# Patient Record
Sex: MALE | Race: BLACK OR AFRICAN AMERICAN | NOT HISPANIC OR LATINO | URBAN - METROPOLITAN AREA
[De-identification: names, ages, dates, MRNs, and addresses within clinical notes are randomized per-mention and may not be internally consistent; named-entity substitution may affect disease eponyms.]

---

## 2019-10-07 ENCOUNTER — INPATIENT (INPATIENT)
Facility: HOSPITAL | Age: 39
LOS: 2 days | Discharge: HOME | DRG: 292 | End: 2019-10-10
Attending: INTERNAL MEDICINE | Admitting: STUDENT IN AN ORGANIZED HEALTH CARE EDUCATION/TRAINING PROGRAM
Payer: COMMERCIAL

## 2019-10-07 VITALS
DIASTOLIC BLOOD PRESSURE: 115 MMHG | HEIGHT: 76 IN | OXYGEN SATURATION: 98 % | RESPIRATION RATE: 18 BRPM | WEIGHT: 315 LBS | HEART RATE: 70 BPM | TEMPERATURE: 98 F | SYSTOLIC BLOOD PRESSURE: 184 MMHG

## 2019-10-07 PROCEDURE — 99283 EMERGENCY DEPT VISIT LOW MDM: CPT

## 2019-10-07 NOTE — ED PROVIDER NOTE - OBJECTIVE STATEMENT
39yr M hx of CHF on medication including diuretics presented to another facility with sob and worsening edema and was treated as NSTEMI due to persistent chest pain despite normal trop ekg non specific. arrived on heparin infusion and received aspirin.  pt reports travelling and missing his meds and felt sob and LEACH with minimal effort. denies chest pain. has peripheral edema but symmetric. denies fever chills, abd pain, change in bowel habits but reports decreased urine output.

## 2019-10-07 NOTE — ED PROVIDER NOTE - ATTENDING CONTRIBUTION TO CARE
39yr M hx of CHF and recent clean cath in December p/w sob and bishop after missing a few doses of his diuretic due to travelling. leg swelling, symmetric and non painful. no cp. no fever chills, no abd pain, no cough, no change in bowel habits. pt was seen at another facility and had trop neg, bnp 1000, and ekg non specific and cardiology was consulted who recommended NSTEMI treatment with heparin.   exam pitting edema bilat, no heart murmur, clear lungs, soft abd.    low concern for ongoing ischemia, more likely CHF. pt now more comfotrable and no longer short of breath. will continue heparin, send labs repeat trop cxr and consult cards regarding management and admission.  signed out pending labs, cxr.

## 2019-10-08 DIAGNOSIS — N17.9 ACUTE KIDNEY FAILURE, UNSPECIFIED: ICD-10-CM

## 2019-10-08 DIAGNOSIS — Z29.9 ENCOUNTER FOR PROPHYLACTIC MEASURES, UNSPECIFIED: ICD-10-CM

## 2019-10-08 DIAGNOSIS — I50.9 HEART FAILURE, UNSPECIFIED: ICD-10-CM

## 2019-10-08 DIAGNOSIS — I10 ESSENTIAL (PRIMARY) HYPERTENSION: ICD-10-CM

## 2019-10-08 DIAGNOSIS — R94.31 ABNORMAL ELECTROCARDIOGRAM [ECG] [EKG]: ICD-10-CM

## 2019-10-08 DIAGNOSIS — D64.9 ANEMIA, UNSPECIFIED: ICD-10-CM

## 2019-10-08 LAB
ALBUMIN SERPL ELPH-MCNC: 3.3 G/DL — SIGNIFICANT CHANGE UP (ref 3.3–5)
ALP SERPL-CCNC: 63 U/L — SIGNIFICANT CHANGE UP (ref 40–120)
ALT FLD-CCNC: 25 U/L — SIGNIFICANT CHANGE UP (ref 10–45)
ANION GAP SERPL CALC-SCNC: 10 MMOL/L — SIGNIFICANT CHANGE UP (ref 5–17)
ANION GAP SERPL CALC-SCNC: 11 MMOL/L — SIGNIFICANT CHANGE UP (ref 5–17)
AST SERPL-CCNC: 19 U/L — SIGNIFICANT CHANGE UP (ref 10–40)
BASOPHILS # BLD AUTO: 0.06 K/UL — SIGNIFICANT CHANGE UP (ref 0–0.2)
BASOPHILS # BLD AUTO: 0.08 K/UL — SIGNIFICANT CHANGE UP (ref 0–0.2)
BASOPHILS NFR BLD AUTO: 0.6 % — SIGNIFICANT CHANGE UP (ref 0–2)
BASOPHILS NFR BLD AUTO: 0.9 % — SIGNIFICANT CHANGE UP (ref 0–2)
BILIRUB SERPL-MCNC: 0.3 MG/DL — SIGNIFICANT CHANGE UP (ref 0.2–1.2)
BUN SERPL-MCNC: 15 MG/DL — SIGNIFICANT CHANGE UP (ref 7–23)
BUN SERPL-MCNC: 17 MG/DL — SIGNIFICANT CHANGE UP (ref 7–23)
CALCIUM SERPL-MCNC: 8.2 MG/DL — LOW (ref 8.4–10.5)
CALCIUM SERPL-MCNC: 8.4 MG/DL — SIGNIFICANT CHANGE UP (ref 8.4–10.5)
CHLORIDE SERPL-SCNC: 107 MMOL/L — SIGNIFICANT CHANGE UP (ref 96–108)
CHLORIDE SERPL-SCNC: 108 MMOL/L — SIGNIFICANT CHANGE UP (ref 96–108)
CO2 SERPL-SCNC: 22 MMOL/L — SIGNIFICANT CHANGE UP (ref 22–31)
CO2 SERPL-SCNC: 23 MMOL/L — SIGNIFICANT CHANGE UP (ref 22–31)
CREAT SERPL-MCNC: 1.12 MG/DL — SIGNIFICANT CHANGE UP (ref 0.5–1.3)
CREAT SERPL-MCNC: 1.4 MG/DL — HIGH (ref 0.5–1.3)
EOSINOPHIL # BLD AUTO: 0.15 K/UL — SIGNIFICANT CHANGE UP (ref 0–0.5)
EOSINOPHIL # BLD AUTO: 0.16 K/UL — SIGNIFICANT CHANGE UP (ref 0–0.5)
EOSINOPHIL NFR BLD AUTO: 1.5 % — SIGNIFICANT CHANGE UP (ref 0–6)
EOSINOPHIL NFR BLD AUTO: 1.8 % — SIGNIFICANT CHANGE UP (ref 0–6)
FERRITIN SERPL-MCNC: 51 NG/ML — SIGNIFICANT CHANGE UP (ref 30–400)
FOLATE SERPL-MCNC: 15.8 NG/ML — SIGNIFICANT CHANGE UP
GLUCOSE SERPL-MCNC: 109 MG/DL — HIGH (ref 70–99)
GLUCOSE SERPL-MCNC: 112 MG/DL — HIGH (ref 70–99)
HCT VFR BLD CALC: 36.9 % — LOW (ref 39–50)
HCT VFR BLD CALC: 39 % — SIGNIFICANT CHANGE UP (ref 39–50)
HGB BLD-MCNC: 11.5 G/DL — LOW (ref 13–17)
HGB BLD-MCNC: 11.7 G/DL — LOW (ref 13–17)
IMM GRANULOCYTES NFR BLD AUTO: 0.4 % — SIGNIFICANT CHANGE UP (ref 0–1.5)
IRON SATN MFR SERPL: 25 UG/DL — LOW (ref 45–165)
IRON SATN MFR SERPL: 7 % — LOW (ref 16–55)
LYMPHOCYTES # BLD AUTO: 1.43 K/UL — SIGNIFICANT CHANGE UP (ref 1–3.3)
LYMPHOCYTES # BLD AUTO: 1.51 K/UL — SIGNIFICANT CHANGE UP (ref 1–3.3)
LYMPHOCYTES # BLD AUTO: 14.1 % — SIGNIFICANT CHANGE UP (ref 13–44)
LYMPHOCYTES # BLD AUTO: 16.8 % — SIGNIFICANT CHANGE UP (ref 13–44)
MAGNESIUM SERPL-MCNC: 2.1 MG/DL — SIGNIFICANT CHANGE UP (ref 1.6–2.6)
MCHC RBC-ENTMCNC: 21.3 PG — LOW (ref 27–34)
MCHC RBC-ENTMCNC: 21.8 PG — LOW (ref 27–34)
MCHC RBC-ENTMCNC: 30 GM/DL — LOW (ref 32–36)
MCHC RBC-ENTMCNC: 31.2 GM/DL — LOW (ref 32–36)
MCV RBC AUTO: 70 FL — LOW (ref 80–100)
MCV RBC AUTO: 71 FL — LOW (ref 80–100)
MONOCYTES # BLD AUTO: 0.53 K/UL — SIGNIFICANT CHANGE UP (ref 0–0.9)
MONOCYTES # BLD AUTO: 0.68 K/UL — SIGNIFICANT CHANGE UP (ref 0–0.9)
MONOCYTES NFR BLD AUTO: 6.2 % — SIGNIFICANT CHANGE UP (ref 2–14)
MONOCYTES NFR BLD AUTO: 6.3 % — SIGNIFICANT CHANGE UP (ref 2–14)
NEUTROPHILS # BLD AUTO: 5.89 K/UL — SIGNIFICANT CHANGE UP (ref 1.8–7.4)
NEUTROPHILS # BLD AUTO: 8.27 K/UL — HIGH (ref 1.8–7.4)
NEUTROPHILS NFR BLD AUTO: 69 % — SIGNIFICANT CHANGE UP (ref 43–77)
NEUTROPHILS NFR BLD AUTO: 77.1 % — HIGH (ref 43–77)
NT-PROBNP SERPL-SCNC: 939 PG/ML — HIGH (ref 0–300)
PHOSPHATE SERPL-MCNC: 2.8 MG/DL — SIGNIFICANT CHANGE UP (ref 2.5–4.5)
PLATELET # BLD AUTO: 260 K/UL — SIGNIFICANT CHANGE UP (ref 150–400)
PLATELET # BLD AUTO: 277 K/UL — SIGNIFICANT CHANGE UP (ref 150–400)
POTASSIUM SERPL-MCNC: 3.6 MMOL/L — SIGNIFICANT CHANGE UP (ref 3.5–5.3)
POTASSIUM SERPL-MCNC: 4 MMOL/L — SIGNIFICANT CHANGE UP (ref 3.5–5.3)
POTASSIUM SERPL-SCNC: 3.6 MMOL/L — SIGNIFICANT CHANGE UP (ref 3.5–5.3)
POTASSIUM SERPL-SCNC: 4 MMOL/L — SIGNIFICANT CHANGE UP (ref 3.5–5.3)
PROT SERPL-MCNC: 6.4 G/DL — SIGNIFICANT CHANGE UP (ref 6–8.3)
RBC # BLD: 5.27 M/UL — SIGNIFICANT CHANGE UP (ref 4.2–5.8)
RBC # BLD: 5.49 M/UL — SIGNIFICANT CHANGE UP (ref 4.2–5.8)
RBC # FLD: 17.7 % — HIGH (ref 10.3–14.5)
RBC # FLD: 18.4 % — HIGH (ref 10.3–14.5)
SODIUM SERPL-SCNC: 139 MMOL/L — SIGNIFICANT CHANGE UP (ref 135–145)
SODIUM SERPL-SCNC: 142 MMOL/L — SIGNIFICANT CHANGE UP (ref 135–145)
TIBC SERPL-MCNC: 382 UG/DL — SIGNIFICANT CHANGE UP (ref 220–430)
TROPONIN T, HIGH SENSITIVITY RESULT: 47 NG/L — SIGNIFICANT CHANGE UP (ref 0–51)
TROPONIN T, HIGH SENSITIVITY RESULT: 51 NG/L — SIGNIFICANT CHANGE UP (ref 0–51)
UIBC SERPL-MCNC: 357 UG/DL — SIGNIFICANT CHANGE UP (ref 110–370)
VIT B12 SERPL-MCNC: 338 PG/ML — SIGNIFICANT CHANGE UP (ref 232–1245)
WBC # BLD: 10.72 K/UL — HIGH (ref 3.8–10.5)
WBC # BLD: 8.53 K/UL — SIGNIFICANT CHANGE UP (ref 3.8–10.5)
WBC # FLD AUTO: 10.72 K/UL — HIGH (ref 3.8–10.5)
WBC # FLD AUTO: 8.53 K/UL — SIGNIFICANT CHANGE UP (ref 3.8–10.5)

## 2019-10-08 PROCEDURE — 71045 X-RAY EXAM CHEST 1 VIEW: CPT | Mod: 26

## 2019-10-08 PROCEDURE — 12345: CPT | Mod: NC

## 2019-10-08 PROCEDURE — 99223 1ST HOSP IP/OBS HIGH 75: CPT

## 2019-10-08 RX ORDER — NIFEDIPINE 30 MG
60 TABLET, EXTENDED RELEASE 24 HR ORAL DAILY
Refills: 0 | Status: DISCONTINUED | OUTPATIENT
Start: 2019-10-09 | End: 2019-10-10

## 2019-10-08 RX ORDER — NIFEDIPINE 30 MG
30 TABLET, EXTENDED RELEASE 24 HR ORAL ONCE
Refills: 0 | Status: COMPLETED | OUTPATIENT
Start: 2019-10-08 | End: 2019-10-08

## 2019-10-08 RX ORDER — FERROUS SULFATE 325(65) MG
325 TABLET ORAL DAILY
Refills: 0 | Status: DISCONTINUED | OUTPATIENT
Start: 2019-10-08 | End: 2019-10-10

## 2019-10-08 RX ORDER — INFLUENZA VIRUS VACCINE 15; 15; 15; 15 UG/.5ML; UG/.5ML; UG/.5ML; UG/.5ML
0.5 SUSPENSION INTRAMUSCULAR ONCE
Refills: 0 | Status: DISCONTINUED | OUTPATIENT
Start: 2019-10-08 | End: 2019-10-10

## 2019-10-08 RX ORDER — FUROSEMIDE 40 MG
40 TABLET ORAL
Refills: 0 | Status: DISCONTINUED | OUTPATIENT
Start: 2019-10-08 | End: 2019-10-10

## 2019-10-08 RX ORDER — ASPIRIN/CALCIUM CARB/MAGNESIUM 324 MG
81 TABLET ORAL DAILY
Refills: 0 | Status: DISCONTINUED | OUTPATIENT
Start: 2019-10-08 | End: 2019-10-10

## 2019-10-08 RX ORDER — SIMVASTATIN 20 MG/1
20 TABLET, FILM COATED ORAL AT BEDTIME
Refills: 0 | Status: DISCONTINUED | OUTPATIENT
Start: 2019-10-08 | End: 2019-10-10

## 2019-10-08 RX ORDER — NIFEDIPINE 30 MG
30 TABLET, EXTENDED RELEASE 24 HR ORAL DAILY
Refills: 0 | Status: DISCONTINUED | OUTPATIENT
Start: 2019-10-08 | End: 2019-10-08

## 2019-10-08 RX ORDER — ENOXAPARIN SODIUM 100 MG/ML
40 INJECTION SUBCUTANEOUS DAILY
Refills: 0 | Status: DISCONTINUED | OUTPATIENT
Start: 2019-10-08 | End: 2019-10-10

## 2019-10-08 RX ORDER — LISINOPRIL 2.5 MG/1
20 TABLET ORAL DAILY
Refills: 0 | Status: DISCONTINUED | OUTPATIENT
Start: 2019-10-08 | End: 2019-10-10

## 2019-10-08 RX ORDER — CARVEDILOL PHOSPHATE 80 MG/1
12.5 CAPSULE, EXTENDED RELEASE ORAL EVERY 12 HOURS
Refills: 0 | Status: DISCONTINUED | OUTPATIENT
Start: 2019-10-08 | End: 2019-10-10

## 2019-10-08 RX ADMIN — CARVEDILOL PHOSPHATE 12.5 MILLIGRAM(S): 80 CAPSULE, EXTENDED RELEASE ORAL at 17:39

## 2019-10-08 RX ADMIN — Medication 40 MILLIGRAM(S): at 17:39

## 2019-10-08 RX ADMIN — Medication 30 MILLIGRAM(S): at 08:19

## 2019-10-08 RX ADMIN — CARVEDILOL PHOSPHATE 12.5 MILLIGRAM(S): 80 CAPSULE, EXTENDED RELEASE ORAL at 06:51

## 2019-10-08 RX ADMIN — SIMVASTATIN 20 MILLIGRAM(S): 20 TABLET, FILM COATED ORAL at 22:00

## 2019-10-08 RX ADMIN — Medication 325 MILLIGRAM(S): at 17:39

## 2019-10-08 RX ADMIN — LISINOPRIL 20 MILLIGRAM(S): 2.5 TABLET ORAL at 06:51

## 2019-10-08 RX ADMIN — Medication 81 MILLIGRAM(S): at 12:03

## 2019-10-08 RX ADMIN — Medication 40 MILLIGRAM(S): at 06:51

## 2019-10-08 RX ADMIN — ENOXAPARIN SODIUM 40 MILLIGRAM(S): 100 INJECTION SUBCUTANEOUS at 12:03

## 2019-10-08 RX ADMIN — Medication 30 MILLIGRAM(S): at 22:00

## 2019-10-08 NOTE — PROVIDER CONTACT NOTE (OTHER) - ASSESSMENT
Pt A&Ox4, pt states his blood normally runs higher than this. Pt denies CP palpitation HA or SOB. Pt given lisinopril, procardia and IVP lasix.

## 2019-10-08 NOTE — PROGRESS NOTE ADULT - SUBJECTIVE AND OBJECTIVE BOX
Patient to be followed by Non-Service Consult Attending.  Please call [phone] regarding clinical questions regarding this patient. Uptitrate his BP meds and get under better control. Increased the nifedipine to 60 mg daily and if his BP remains elevated >140/>90, increase the lisinopril to 30 mg daily. Pending ECHO. No plans for cath or stress test as the decompensated HFpEF and elevated troponin are directly related to the severely elevated blood pressures. Continue IV diuresis and will need to go on adequate diuretics. Counseled on good BP monitoring at home and encouraged him never to go anywhere without his meds. Complete note to follow.     Prasanth Rader MD, MPH, PEREZ, RPVI, FACC  Cardiovascular Specialist   [facility]  c: [phone]  e: [email or web address]

## 2019-10-08 NOTE — H&P ADULT - PROBLEM SELECTOR PLAN 1
hx of CHF, ?2/2 uncontrolled HTN. No TTE on EMR. Elevated BNP, SOB and LE edema c/w likely CHF exacerbation   c/w lasix 40 IV BID for now, monitor electrolytes closely   daily weights  strict I/Os  check TTE  monitor on tele  c/w statin

## 2019-10-08 NOTE — PROGRESS NOTE ADULT - PROBLEM SELECTOR PLAN 3
prolonged QT on EKG to 524  no electrolyte abn noted, will c/t monitor and replete as needed    monitor on tele   repeat EKG in AM prolonged QT on EKG to 524  no electrolyte abn noted, will c/t monitor and replete as needed    monitor on tele   repeat EKG is ordered

## 2019-10-08 NOTE — PROGRESS NOTE ADULT - PROBLEM SELECTOR PLAN 1
hx of CHF, ?2/2 uncontrolled HTN. No TTE on EMR. Elevated BNP, SOB and LE edema c/w likely CHF exacerbation   c/w lasix 40 IV BID for now, monitor electrolytes closely   daily weights  strict I/Os  check TTE  monitor on tele  c/w statin  Cardio consult is pend hx of CHF, ?2/2 uncontrolled HTN. No TTE on EMR. Elevated BNP, SOB and LE edema c/w likely CHF exacerbation   c/w lasix 40 IV BID for now, monitor electrolytes closely   daily weights  strict I/Os  check TTE  monitor on tele  c/w statin  Cardio consult is pend  Cont Coreg/Lisinopril which were pt's home meds

## 2019-10-08 NOTE — CHART NOTE - NSCHARTNOTEFT_GEN_A_CORE
CC: Hypertension 166/101    39 y.o male transferred from OSH last night for symptomatic HF and HTN of 190s/120s, was found to have a blood pressure of 166/101 this afternoon.  Patient was started on 40mg IV BID at admission.  Patient was seen at bedside and denied any dizziness, headaches, chest pain, shortness of breath or dyspnea.      ICU Vital Signs Last 24 Hrs  T(C): 36.6 (08 Oct 2019 14:55), Max: 36.8 (07 Oct 2019 23:27)  T(F): 97.9 (08 Oct 2019 14:55), Max: 98.3 (07 Oct 2019 23:27)  HR: 67 (08 Oct 2019 17:34) (67 - 82)  BP: 168/94 (08 Oct 2019 17:34) (141/82 - 184/115)  BP(mean): --  ABP: --  ABP(mean): --  RR: 20 (08 Oct 2019 14:55) (18 - 21)  SpO2: 97% (08 Oct 2019 14:55) (95% - 98%)    Physical Exam:    General: NAD  HEENT: CC: Hypertension 166/101    39 y.o male transferred from OSH last night for symptomatic HF and HTN of 190s/120s, was found to have a blood pressure of 166/101 this afternoon.  Patient was started on 40mg IV BID at admission.  Patient was seen at bedside and denied any dizziness, headaches, chest pain, shortness of breath or dyspnea.      ICU Vital Signs Last 24 Hrs  T(C): 36.6 (08 Oct 2019 14:55), Max: 36.8 (07 Oct 2019 23:27)  T(F): 97.9 (08 Oct 2019 14:55), Max: 98.3 (07 Oct 2019 23:27)  HR: 67 (08 Oct 2019 17:34) (67 - 82)  BP: 168/94 (08 Oct 2019 17:34) (141/82 - 184/115)  BP(mean): --  ABP: --  ABP(mean): --  RR: 20 (08 Oct 2019 14:55) (18 - 21)  SpO2: 97% (08 Oct 2019 14:55) (95% - 98%)    Physical Exam:    General: NAD  HEENT: Normocephalic, atraumatic.  Moist mucous membranes  Pulmonary: Breath sounds heard bilaterally without crackles  Cardiovascular: S1 and S2 heard on auscultation.  Regular rate and rhythm  Peripheral Vascular: Pedal edema present bilaterally.  DP present bilaterally    Assessment: Htn  Plan: Patient's blood pressure has downtrended since admission.  Cardiology was consulted, will increase Nifedipine to 60mg daily, continue with scheduled medications including IV Lasix and home HTN medications, and continue to monitor.  RN will notify provider if there are any concerns.    Sumeet Santos PA-C  Department of Medicine

## 2019-10-08 NOTE — PROGRESS NOTE ADULT - PROBLEM SELECTOR PLAN 2
pt with uncontrolled HTN at OSH, 190s/120s. s/p lisinopril, coreg and brief labetalol gtt. BP improved currently, but still above goal   c/w lisinopril at home dose - may have to d/c pending Cr trend   c/w coreg at home dose   c/w nifedipine 30mg QD - may have to increase if BP remains uncontrolled pt with uncontrolled HTN at OSH, 190s/120s. s/p lisinopril, coreg and brief labetalol gtt. BP improved currently, but still above goal   c/w lisinopril at home dose / monitor Cr   c/w coreg at home dose   c/w nifedipine 30mg QD

## 2019-10-08 NOTE — H&P ADULT - PROBLEM SELECTOR PLAN 4
mild anemia on labs, unclear etiology   will check iron studies, folate, b12  c/t monitor MUKESH vs CKD, unclear baseline Cr   possibly 2/2 ACEi vs uncontrolled hypertension   will check urine studies   c/t monitor  if Cr remains elevated, consider renal US

## 2019-10-08 NOTE — H&P ADULT - PROBLEM SELECTOR PLAN 5
Lovenox for DVT ppx mild anemia on labs, unclear etiology   will check iron studies, folate, b12  c/t monitor

## 2019-10-08 NOTE — PROGRESS NOTE ADULT - PROBLEM SELECTOR PLAN 5
mild anemia on labs, unclear etiology   will check iron studies, folate, b12  c/t monitor Iron def anemia   will start ferrous sulfate   Pt will need to f.up with PCP for further work up   Fecal occult test is ordered

## 2019-10-08 NOTE — ED ADULT NURSE REASSESSMENT NOTE - NS ED NURSE REASSESS COMMENT FT1
pt presents with heparin drip infusing from EMS.  MD Nieves called cards and was recommended to stop heparin.  Heparin stopped at 0007 in ED.

## 2019-10-08 NOTE — H&P ADULT - PROBLEM SELECTOR PLAN 2
pt with uncontrolled HTN at OSH, 190s/120s. s/p lisinopril, coreg and brief labetalol gtt. BP improved currently, but still above goal   c/w lisinopril at home dose - may have to d/c pending Cr trend   c/w coreg at home dose   c/w nifedipine 30mg QD - may have to increase if BP remains uncontrolled

## 2019-10-08 NOTE — H&P ADULT - NSHPLABSRESULTS_GEN_ALL_CORE
Labs, imaging and EKG personally reviewed and interpreted by me - mild anemia 11.5, Cr 1.40, BNP elevated, delta trop (-). CXR with no opacities. EKG NSR 70s, TWI in leads II/AVF and V5-v6, .                          11.5   8.53  )-----------( 277      ( 08 Oct 2019 00:38 )             36.9     10-08    142  |  108  |  17  ----------------------------<  109<H>  4.0   |  23  |  1.40<H>    Ca    8.4      08 Oct 2019 00:38    TPro  6.4  /  Alb  3.3  /  TBili  0.3  /  DBili  x   /  AST  19  /  ALT  25  /  AlkPhos  63  10-08    Serum Pro-Brain Natriuretic Peptide: 939 pg/mL (10.08.19 @ 00:38)    Troponin T, High Sensitivity Result: 51 --> 47    < from: Xray Chest 1 View AP/PA (10.08.19 @ 03:00) >  ******PRELIMINARY REPORT******        INTERPRETATION:  clear lungs

## 2019-10-08 NOTE — H&P ADULT - NSHPREVIEWOFSYSTEMS_GEN_ALL_CORE
REVIEW OF SYSTEMS:    CONSTITUTIONAL: No weakness, fevers, chills  EYES/ENT: No visual changes, throat pain   NECK: No pain or stiffness  RESPIRATORY: +dry cough, no wheezing, +shortness of breath  CARDIOVASCULAR: No chest pain or palpitations, +LE swelling   GASTROINTESTINAL: no nausea, vomiting, no abdominal pain, no BRBPR  GENITOURINARY: no polyuria, no dysuria  NEUROLOGICAL: no numbness, no headaches, no confusion   MUSCULOSKELETAL: no back pain, no weakness   SKIN: No rashes, or lesions   PSYCH: no anxiety, depression  HEME: no gum bleeding, no bruising

## 2019-10-08 NOTE — PROGRESS NOTE ADULT - SUBJECTIVE AND OBJECTIVE BOX
Patient is a 39y old  Male who presents with a chief complaint of SOB (08 Oct 2019 04:33)      INTERVAL HPI/OVERNIGHT EVENTS:    39M with PMH of CHF, HTN p/w SOB and LE swelling. Pt states he has been having SOB x 1.5 weeks, which has been progressively worsening. SOB is both at rest and minimal exertion; normally is very mobile, but now cannot climb one flight of stairs without feeling SOB.  Pt went to OSH for above symptoms, found to be hypertensive to 190s/120s, had elevated BNP and trop (-) x 2, EKG showing TWI in inferolateral leads. C/f NSTEMI, pt was ASA/Brilinta loaded, given heparin load and transferred to [facility] for further cardiac eval    Medications:MEDICATIONS  (STANDING):  aspirin  chewable 81 milliGRAM(s) Oral daily  carvedilol 12.5 milliGRAM(s) Oral every 12 hours  enoxaparin Injectable 40 milliGRAM(s) SubCutaneous daily  furosemide   Injectable 40 milliGRAM(s) IV Push two times a day  influenza   Vaccine 0.5 milliLiter(s) IntraMuscular once  lisinopril 20 milliGRAM(s) Oral daily  NIFEdipine XL 30 milliGRAM(s) Oral daily  simvastatin 20 milliGRAM(s) Oral at bedtime    MEDICATIONS  (PRN):      Allergies: Allergies    No Known Allergies        REVIEW OF SYSTEMS:  CONSTITUTIONAL: No fever  CARDIOVASCULAR: No chest pain, POS b/l  leg swelling  GASTROINTESTINAL: No abdominal or epigastric pain. No nausea, vomiting, or hematemesis; No diarrhea or constipation. No melena or hematochezia.  GENITOURINARY: No dysuria, frequency, hematuria, or incontinence  NEUROLOGICAL: No headaches, memory loss, loss of strength, numbness, or tremors  SKIN: No itching, burning, rashes, or lesions   LYMPH NODES: No enlarged glands  ENDOCRINE: No heat or cold intolerance; No hair loss  MUSCULOSKELETAL: No joint pain or swelling; No muscle, back, or extremity pain  PSYCHIATRIC: No depression, anxiety, mood swings, or difficulty sleeping  HEME/LYMPH: No easy bruising, or bleeding gums  ALLERY AND IMMUNOLOGIC: No hives or eczema    T(C): 36.7 (10-08-19 @ 11:12), Max: 36.8 (10-07-19 @ 23:27)  HR: 69 (10-08-19 @ 11:12) (67 - 82)  BP: 154/97 (10-08-19 @ 11:12) (141/82 - 184/115)  RR: 20 (10-08-19 @ 11:12) (18 - 21)  SpO2: 95% (10-08-19 @ 11:12) (95% - 98%)  Wt(kg): --Vital Signs Last 24 Hrs  T(C): 36.7 (08 Oct 2019 11:12), Max: 36.8 (07 Oct 2019 23:27)  T(F): 98.1 (08 Oct 2019 11:12), Max: 98.3 (07 Oct 2019 23:27)  HR: 69 (08 Oct 2019 11:12) (67 - 82)  BP: 154/97 (08 Oct 2019 11:12) (141/82 - 184/115)  BP(mean): --  RR: 20 (08 Oct 2019 11:12) (18 - 21)  SpO2: 95% (08 Oct 2019 11:12) (95% - 98%)  I&O's Summary          PHYSICAL EXAM:  GENERAL: NAD, well-groomed, well-developed  HEAD:  Atraumatic, Normocephalic  EYES: EOMI, PERRLA, conjunctiva and sclera clear  ENMT: No tonsillar erythema, exudates, or enlargement; Moist mucous membranes, Good dentition, No lesions  NECK: Supple, No JVD, Normal thyroid  NERVOUS SYSTEM:  Alert & Oriented X3, Good concentration; Motor Strength 5/5 B/L upper and lower extremities; DTRs 2+ intact and symmetric  CHEST/LUNG: Clear to percussion bilaterally; No rales, rhonchi, wheezing, or rubs  HEART: Regular rate and rhythm; No murmurs, rubs, or gallops  ABDOMEN: Soft, Nontender, Nondistended; Bowel sounds present  EXTREMITIES:  2+ Peripheral Pulses, No clubbing, cyanosis, or edema  LYMPH: No lymphadenopathy noted  SKIN: No rashes or lesions    Consultant(s) Notes Reviewed:  [x ] YES  [ ] NO  Care Discussed with Consultants/Other Providers [ x] YES  [ ] NO  Name of Consultant  LABS:                        11.7   10.72 )-----------( 260      ( 08 Oct 2019 10:09 )             39.0     10-08    139  |  107  |  15  ----------------------------<  112<H>  3.6   |  22  |  1.12    Ca    8.2<L>      08 Oct 2019 07:27  Phos  2.8     10-08  Mg     2.1     10-08    TPro  6.4  /  Alb  3.3  /  TBili  0.3  /  DBili  x   /  AST  19  /  ALT  25  /  AlkPhos  63  10-08        CAPILLARY BLOOD GLUCOSE                RADIOLOGY & ADDITIONAL TESTS:  EKG :     Imaging Personally Reviewed:  [ ] YES  [ ] NO  HEALTH ISSUES - PROBLEM Dx:  Prolonged QT interval: Prolonged QT interval  Need for prophylactic measure: Need for prophylactic measure  Anemia: Anemia  MUKESH (acute kidney injury): MUKESH (acute kidney injury)  Essential hypertension: Essential hypertension  Acute on chronic heart failure, unspecified heart failure type: Acute on chronic heart failure, unspecified heart failure type Patient is a 39y old  Male who presents with a chief complaint of SOB (08 Oct 2019 04:33)      INTERVAL HPI/OVERNIGHT EVENTS:    39M with PMH of CHF , HTN p/w SOB and LE swelling. Pt states he has been having SOB x 1.5 weeks, which has been progressively worsening. SOB is both at rest and minimal exertion; normally is very mobile, but now cannot climb one flight of stairs without feeling SOB.  Patient states that he ran out  of his medications because he travelled.  Pt went to OSH for above symptoms, found to be hypertensive to 190s/120s, had elevated BNP and trop (-) x 2, EKG showing TWI in inferolateral leads. C/f NSTEMI, pt was ASA/Brilinta loaded, given heparin load and transferred to [facility] for further cardiac eval.      Medications:MEDICATIONS  (STANDING):  aspirin  chewable 81 milliGRAM(s) Oral daily  carvedilol 12.5 milliGRAM(s) Oral every 12 hours  enoxaparin Injectable 40 milliGRAM(s) SubCutaneous daily  furosemide   Injectable 40 milliGRAM(s) IV Push two times a day  influenza   Vaccine 0.5 milliLiter(s) IntraMuscular once  lisinopril 20 milliGRAM(s) Oral daily  NIFEdipine XL 30 milliGRAM(s) Oral daily  simvastatin 20 milliGRAM(s) Oral at bedtime    MEDICATIONS  (PRN):      Allergies: Allergies    No Known Allergies        REVIEW OF SYSTEMS:  CONSTITUTIONAL: No fever  CARDIOVASCULAR: No chest pain, POS b/l  leg swelling  GASTROINTESTINAL: No abdominal pain. No  vomiting, No diarrhea   GENITOURINARY: No dysuria  NEUROLOGICAL: No headaches      T(C): 36.7 (10-08-19 @ 11:12), Max: 36.8 (10-07-19 @ 23:27)  HR: 69 (10-08-19 @ 11:12) (67 - 82)  BP: 154/97 (10-08-19 @ 11:12) (141/82 - 184/115)  RR: 20 (10-08-19 @ 11:12) (18 - 21)  SpO2: 95% (10-08-19 @ 11:12) (95% - 98%)  Wt(kg): --Vital Signs Last 24 Hrs  T(C): 36.7 (08 Oct 2019 11:12), Max: 36.8 (07 Oct 2019 23:27)  T(F): 98.1 (08 Oct 2019 11:12), Max: 98.3 (07 Oct 2019 23:27)  HR: 69 (08 Oct 2019 11:12) (67 - 82)  BP: 154/97 (08 Oct 2019 11:12) (141/82 - 184/115)  BP(mean): --  RR: 20 (08 Oct 2019 11:12) (18 - 21)  SpO2: 95% (08 Oct 2019 11:12) (95% - 98%)  I&O's Summary          PHYSICAL EXAM:  GENERAL: NAD, obese male   HEAD:  Atraumatic, Normocephalic  NECK: Supple, No JVD, Normal thyroid  NERVOUS SYSTEM:  Alert & Oriented X3, Good concentration  CHEST/LUNG: pos air entry bilaterally unable to hear any crackles   HEART: Regular rate and rhythm  ABDOMEN: Soft, Nontender, very Protruberant; Bowel sounds present  EXTREMITIES: pos B/L leg swelling     Consultant(s) Notes Reviewed:  [x ] YES  [ ] NO  Care Discussed with Consultants/Other Providers [ x] YES  [ ] NO  Name of Consultant  LABS:                        11.7   10.72 )-----------( 260      ( 08 Oct 2019 10:09 )             39.0     10-08    139  |  107  |  15  ----------------------------<  112<H>  3.6   |  22  |  1.12    Ca    8.2<L>      08 Oct 2019 07:27  Phos  2.8     10-08  Mg     2.1     10-08    TPro  6.4  /  Alb  3.3  /  TBili  0.3  /  DBili  x   /  AST  19  /  ALT  25  /  AlkPhos  63  10-08        CAPILLARY BLOOD GLUCOSE                RADIOLOGY & ADDITIONAL TESTS:  EKG :     Imaging Personally Reviewed:  [ ] YES  [ ] NO  HEALTH ISSUES - PROBLEM Dx:  Prolonged QT interval: Prolonged QT interval  Need for prophylactic measure: Need for prophylactic measure  Anemia: Anemia  MUKESH (acute kidney injury): MUKESH (acute kidney injury)  Essential hypertension: Essential hypertension  Acute on chronic heart failure, unspecified heart failure type: Acute on chronic heart failure, unspecified heart failure type

## 2019-10-08 NOTE — ED ADULT NURSE REASSESSMENT NOTE - NS ED NURSE REASSESS COMMENT FT1
Pt resting comfortably in bed.  VSS.  NAD.  PERRL wnl, bernard with equal strength.  NSR on cm at 67 bpm.  No chest pain or sob.  Pt c/o 10/10 headache with nitropaste on chest.  Abdomen NT ND. Peripheral pulses +2bl no edema

## 2019-10-08 NOTE — PROGRESS NOTE ADULT - PROBLEM SELECTOR PLAN 4
MUKESH vs CKD, unclear baseline Cr   possibly 2/2 ACEi vs uncontrolled hypertension   will check urine studies   c/t monitor  if Cr remains elevated, consider renal US MUKESH vs CKD, unclear baseline Cr   possibly 2/2 ACEi vs uncontrolled hypertension   Improved today   c/t monitor

## 2019-10-08 NOTE — H&P ADULT - NSHPPHYSICALEXAM_GEN_ALL_CORE
Vital Signs Last 24 Hrs  T(C): 36.7 (08 Oct 2019 00:45), Max: 36.8 (07 Oct 2019 23:27)  T(F): 98.1 (08 Oct 2019 00:45), Max: 98.3 (07 Oct 2019 23:27)  HR: 82 (08 Oct 2019 00:45) (70 - 82)  BP: 161/79 (08 Oct 2019 00:45) (158/103 - 184/115)  BP(mean): --  RR: 21 (08 Oct 2019 00:45) (18 - 21)  SpO2: 96% (08 Oct 2019 00:45) (96% - 98%)    PHYSICAL EXAM:  GENERAL: NAD, well-developed  HEAD:  Atraumatic, normocephalic  EYES: EOMI, conjunctiva and sclera clear  ENT/NECK: Supple, moist MM  CHEST/LUNG: mild b/l rales, no wheezing, otherwise clear   HEART: Regular rate and rhythm; no murmurs  ABDOMEN: Soft, nontender, nondistended; bowel sounds present  EXTREMITIES:  2+ Peripheral Pulses, 2+ pitting edema b/l LE  PSYCH: calm affect, not anxious  NEUROLOGY: non-focal, AAOx3  SKIN: No rashes or lesions  MUSCULOSKELETAL: no back pain, moving all extremities

## 2019-10-08 NOTE — H&P ADULT - PROBLEM SELECTOR PLAN 3
MUKESH vs CKD, unclear baseline Cr   possibly 2/2 ACEi vs uncontrolled hypertension   will check urine studies   c/t monitor  if Cr remains elevated, consider renal US prolonged QT on EKG to 524  no electrolyte abn noted, will c/t monitor and replete as needed    monitor on tele   repeat EKG in AM

## 2019-10-08 NOTE — ED ADULT NURSE NOTE - OBJECTIVE STATEMENT
38 y/o M presents to the ED via EMS c/o N STEMI transfer.  hx of CHF and recent clean cath in December.  Pt transfer from [facility].  Pt reports worsening SOB the past couple of days which prompted him to go to hospital, but pt denies chest pain.  Pt recently missed a few doses of his diuretic due to travelling.  pt was seen at another facility and had trop neg, bnp 1000, and ekg non specific and cardiology was consulted who recommended NSTEMI treatment with heparin and pt given 5000 U bolus prior to arrival and presents with heparin infusing at 10 ml/hr.  Pt presents with 20G in L hand from [facility].  Patient is A&Ox4. Face is symmetrical. PERRL 3mmB. Speech is clear. Patient is moving all extremities with 5/5 strength.  No chest pain, shortness of breath currently in ED.  VSS 38 y/o M presents to the ED via EMS c/o N STEMI transfer.  hx of CHF and recent clean cath in December.  Pt transfer from [facility].  Pt reports worsening SOB the past couple of days which prompted him to go to hospital, but pt denies chest pain.  Pt recently missed a few doses of his diuretic due to travelling.  pt was seen at another facility and had trop neg, bnp 1000, and ekg non specific and cardiology was consulted who recommended NSTEMI treatment with heparin and pt given 5000 U bolus prior to arrival and presents with heparin infusing at 10 ml/hr.  Pt was also given plavix, Brilinta.  Pt also presents with nitropaste on L side of the chest.  Pt presents with 20G in L hand from [facility].  Patient is A&Ox4. Face is symmetrical. PERRL 3mmB. Speech is clear. Patient is moving all extremities with 5/5 strength.  No chest pain, shortness of breath currently in ED.  VSS

## 2019-10-08 NOTE — H&P ADULT - HISTORY OF PRESENT ILLNESS
39M with PMH of CHF, HTN p/w SOB and LE swelling. Pt states he has been having SOB x 1.5 weeks, which has been progressively worsening. SOB is both at rest and minimal exertion; normally is very mobile, but now cannot climb one flight of stairs without feeling SOB. Also endorses +orthopnea, +LE swelling b/l, assoc +occ dry cough. Pt denies any chest pain, palpitations, diaphoresis, nausea/vomiting, lightheadedness, dizziness. Pt states he has not been taking his lasix for past 3 weeks (2/2 missing appt with PMD and travelling to [place]). Pt denies any URI sx, fevers, chills.   Pt went to OSH for above symptoms, found to be hypertensive to 190s/120s, had elevated BNP and trop (-) x 2, EKG showing TWI in inferolateral leads. C/f NSTEMI, pt was ASA/Brilinta loaded, given heparin load and transferred to [facility] for further cardiac eval.

## 2019-10-09 DIAGNOSIS — R94.31 ABNORMAL ELECTROCARDIOGRAM [ECG] [EKG]: ICD-10-CM

## 2019-10-09 LAB
ANION GAP SERPL CALC-SCNC: 11 MMOL/L — SIGNIFICANT CHANGE UP (ref 5–17)
BUN SERPL-MCNC: 17 MG/DL — SIGNIFICANT CHANGE UP (ref 7–23)
CALCIUM SERPL-MCNC: 8.3 MG/DL — LOW (ref 8.4–10.5)
CHLORIDE SERPL-SCNC: 106 MMOL/L — SIGNIFICANT CHANGE UP (ref 96–108)
CO2 SERPL-SCNC: 22 MMOL/L — SIGNIFICANT CHANGE UP (ref 22–31)
CREAT ?TM UR-MCNC: 164 MG/DL — SIGNIFICANT CHANGE UP
CREAT SERPL-MCNC: 1.2 MG/DL — SIGNIFICANT CHANGE UP (ref 0.5–1.3)
GLUCOSE SERPL-MCNC: 103 MG/DL — HIGH (ref 70–99)
HCT VFR BLD CALC: 37.4 % — LOW (ref 39–50)
HGB BLD-MCNC: 11.7 G/DL — LOW (ref 13–17)
MAGNESIUM SERPL-MCNC: 2.2 MG/DL — SIGNIFICANT CHANGE UP (ref 1.6–2.6)
MCHC RBC-ENTMCNC: 21.8 PG — LOW (ref 27–34)
MCHC RBC-ENTMCNC: 31.3 GM/DL — LOW (ref 32–36)
MCV RBC AUTO: 69.8 FL — LOW (ref 80–100)
NRBC # BLD: 0 /100 WBCS — SIGNIFICANT CHANGE UP (ref 0–0)
OB PNL STL: NEGATIVE — SIGNIFICANT CHANGE UP
PHOSPHATE SERPL-MCNC: 3.5 MG/DL — SIGNIFICANT CHANGE UP (ref 2.5–4.5)
PLATELET # BLD AUTO: 233 K/UL — SIGNIFICANT CHANGE UP (ref 150–400)
POTASSIUM SERPL-MCNC: 3.8 MMOL/L — SIGNIFICANT CHANGE UP (ref 3.5–5.3)
POTASSIUM SERPL-SCNC: 3.8 MMOL/L — SIGNIFICANT CHANGE UP (ref 3.5–5.3)
RBC # BLD: 5.36 M/UL — SIGNIFICANT CHANGE UP (ref 4.2–5.8)
RBC # FLD: 18.1 % — HIGH (ref 10.3–14.5)
SODIUM SERPL-SCNC: 139 MMOL/L — SIGNIFICANT CHANGE UP (ref 135–145)
SODIUM UR-SCNC: 68 MMOL/L — SIGNIFICANT CHANGE UP
UUN UR-MCNC: 897 MG/DL — SIGNIFICANT CHANGE UP
WBC # BLD: 8.75 K/UL — SIGNIFICANT CHANGE UP (ref 3.8–10.5)
WBC # FLD AUTO: 8.75 K/UL — SIGNIFICANT CHANGE UP (ref 3.8–10.5)

## 2019-10-09 PROCEDURE — 93010 ELECTROCARDIOGRAM REPORT: CPT

## 2019-10-09 PROCEDURE — 93306 TTE W/DOPPLER COMPLETE: CPT | Mod: 26

## 2019-10-09 PROCEDURE — 99233 SBSQ HOSP IP/OBS HIGH 50: CPT

## 2019-10-09 RX ADMIN — ENOXAPARIN SODIUM 40 MILLIGRAM(S): 100 INJECTION SUBCUTANEOUS at 12:45

## 2019-10-09 RX ADMIN — Medication 40 MILLIGRAM(S): at 18:11

## 2019-10-09 RX ADMIN — SIMVASTATIN 20 MILLIGRAM(S): 20 TABLET, FILM COATED ORAL at 21:09

## 2019-10-09 RX ADMIN — Medication 325 MILLIGRAM(S): at 12:43

## 2019-10-09 RX ADMIN — LISINOPRIL 20 MILLIGRAM(S): 2.5 TABLET ORAL at 05:30

## 2019-10-09 RX ADMIN — Medication 40 MILLIGRAM(S): at 05:30

## 2019-10-09 RX ADMIN — Medication 60 MILLIGRAM(S): at 05:30

## 2019-10-09 RX ADMIN — CARVEDILOL PHOSPHATE 12.5 MILLIGRAM(S): 80 CAPSULE, EXTENDED RELEASE ORAL at 18:11

## 2019-10-09 RX ADMIN — CARVEDILOL PHOSPHATE 12.5 MILLIGRAM(S): 80 CAPSULE, EXTENDED RELEASE ORAL at 05:30

## 2019-10-09 RX ADMIN — Medication 81 MILLIGRAM(S): at 12:43

## 2019-10-09 NOTE — PROGRESS NOTE ADULT - PROBLEM SELECTOR PLAN 6
Lovenox for DVT ppx prolonged QT on EKG on admission and on repeat   no electrolyte abn noted, will c/t monitor and replete as needed    monitor on tele   Patient is noted with T wave inversion and ST depression in the II, AVR / and will f/up with cardiologist about any further work up / F/p TTE for  LVF

## 2019-10-09 NOTE — PROGRESS NOTE ADULT - ASSESSMENT
39M with PMH of CHF, HTN p/w SOB and LE swelling, admitted with acute on chronic heart failure. 39M with PMH of CHF, HTN p/w SOB and LE swelling, admitted with acute on chronic heart failure and abnormal ECG.

## 2019-10-09 NOTE — PROGRESS NOTE ADULT - PROBLEM SELECTOR PLAN 4
MUKESH vs CKD, unclear baseline Cr   possibly 2/2 ACEi vs uncontrolled hypertension   Improved  c/t monitor

## 2019-10-09 NOTE — PROGRESS NOTE ADULT - PROBLEM SELECTOR PLAN 2
pt with uncontrolled HTN at OSH, 190s/120s. s/p lisinopril, coreg and brief labetalol gtt. BP improved currently, but still above goal   c/w lisinopril at home dose / monitor Cr   c/w coreg at home dose   c/w nifedipine 30mg QD-->60 mg oral   BP is better controlled

## 2019-10-09 NOTE — PROGRESS NOTE ADULT - SUBJECTIVE AND OBJECTIVE BOX
Patient is a 39y old  Male who presents with a chief complaint of SOB (09 Oct 2019 09:46)      INTERVAL HPI/OVERNIGHT EVENTS:    patient is seen with no fever .        Medications:MEDICATIONS  (STANDING):  aspirin  chewable 81 milliGRAM(s) Oral daily  carvedilol 12.5 milliGRAM(s) Oral every 12 hours  enoxaparin Injectable 40 milliGRAM(s) SubCutaneous daily  ferrous    sulfate 325 milliGRAM(s) Oral daily  furosemide   Injectable 40 milliGRAM(s) IV Push two times a day  influenza   Vaccine 0.5 milliLiter(s) IntraMuscular once  lisinopril 20 milliGRAM(s) Oral daily  NIFEdipine XL 60 milliGRAM(s) Oral daily  simvastatin 20 milliGRAM(s) Oral at bedtime    MEDICATIONS  (PRN):      Allergies: Allergies    No Known Allergies        REVIEW OF SYSTEMS:  CONSTITUTIONAL: No fever  RESPIRATORY: No cough, wheezing, chills or hemoptysis; No shortness of breath  CARDIOVASCULAR: No chest pain, palpitations, dizziness, or leg swelling  GASTROINTESTINAL: No abdominal or epigastric pain. No nausea, vomiting, or hematemesis; No diarrhea or constipation. No melena or hematochezia.  GENITOURINARY: No dysuria, frequency, hematuria, or incontinence  NEUROLOGICAL: No headaches, memory loss, loss of strength, numbness, or tremors  SKIN: No itching, burning, rashes, or lesions   LYMPH NODES: No enlarged glands  ENDOCRINE: No heat or cold intolerance; No hair loss      T(C): 36.4 (10-09-19 @ 10:00), Max: 36.8 (10-08-19 @ 21:44)  HR: 61 (10-09-19 @ 10:00) (61 - 71)  BP: 129/78 (10-09-19 @ 10:00) (129/78 - 168/94)  RR: 18 (10-09-19 @ 10:00) (18 - 20)  SpO2: 98% (10-09-19 @ 10:00) (95% - 98%)  Wt(kg): --Vital Signs Last 24 Hrs  T(C): 36.4 (09 Oct 2019 10:00), Max: 36.8 (08 Oct 2019 21:44)  T(F): 97.6 (09 Oct 2019 10:00), Max: 98.2 (08 Oct 2019 21:44)  HR: 61 (09 Oct 2019 10:00) (61 - 71)  BP: 129/78 (09 Oct 2019 10:00) (129/78 - 168/94)  BP(mean): --  RR: 18 (09 Oct 2019 10:00) (18 - 20)  SpO2: 98% (09 Oct 2019 10:00) (95% - 98%)  I&O's Summary    08 Oct 2019 07:01  -  09 Oct 2019 07:00  --------------------------------------------------------  IN: 0 mL / OUT: 2200 mL / NET: -2200 mL    09 Oct 2019 07:01  -  09 Oct 2019 11:20  --------------------------------------------------------  IN: 0 mL / OUT: 600 mL / NET: -600 mL          PHYSICAL EXAM:  GENERAL: NAD, obese male   HEAD:  Atraumatic, Normocephalic  NECK: Supple, No JVD, Normal thyroid  NERVOUS SYSTEM:  Alert & Oriented X3, Good concentration  CHEST/LUNG: pos air entry bilaterally unable to hear any crackles   HEART: Regular rate and rhythm  ABDOMEN: Soft, Nontender, very Protruberant; Bowel sounds present  EXTREMITIES: pos B/L leg swelling       Consultant(s) Notes Reviewed:  [x ] YES  [ ] NO  Care Discussed with Consultants/Other Providers [ x] YES  [ ] NO  Name of Consultant  LABS:                        11.7   8.75  )-----------( 233      ( 09 Oct 2019 05:46 )             37.4     10-09    139  |  106  |  17  ----------------------------<  103<H>  3.8   |  22  |  1.20    Ca    8.3<L>      09 Oct 2019 05:46  Phos  3.5     10-09  Mg     2.2     10-09    TPro  6.4  /  Alb  3.3  /  TBili  0.3  /  DBili  x   /  AST  19  /  ALT  25  /  AlkPhos  63  10-08        CAPILLARY BLOOD GLUCOSE                RADIOLOGY & ADDITIONAL TESTS:  EKG :     Imaging Personally Reviewed:  [ ] YES  [ ] NO  HEALTH ISSUES - PROBLEM Dx:  Prolonged QT interval: Prolonged QT interval  Need for prophylactic measure: Need for prophylactic measure  Anemia: Anemia  MUKESH (acute kidney injury): MUKESH (acute kidney injury)  Essential hypertension: Essential hypertension  Acute on chronic heart failure, unspecified heart failure type: Acute on chronic heart failure, unspecified heart failure type Patient is a 39y old  Male who presents with a chief complaint of SOB (09 Oct 2019 09:46)      INTERVAL HPI/OVERNIGHT EVENTS:    Patient is seen with improved SOB and decreased in B/L leg swelling .  no chest pain or palpitations.      Medications:MEDICATIONS  (STANDING):  aspirin  chewable 81 milliGRAM(s) Oral daily  carvedilol 12.5 milliGRAM(s) Oral every 12 hours  enoxaparin Injectable 40 milliGRAM(s) SubCutaneous daily  ferrous    sulfate 325 milliGRAM(s) Oral daily  furosemide   Injectable 40 milliGRAM(s) IV Push two times a day  influenza   Vaccine 0.5 milliLiter(s) IntraMuscular once  lisinopril 20 milliGRAM(s) Oral daily  NIFEdipine XL 60 milliGRAM(s) Oral daily  simvastatin 20 milliGRAM(s) Oral at bedtime    MEDICATIONS  (PRN):      Allergies: Allergies    No Known Allergies      REVIEW OF SYSTEMS:  CONSTITUTIONAL: No fever  RESPIRATORY: No cough, No current shortness of breath ( on walking)   CARDIOVASCULAR: No chest pain, palpitations, dizziness, Pos improved B/L  leg swelling  GASTROINTESTINAL: No abdominal pain. No vomiting,  No diarrhea. No melena  NEUROLOGICAL: No headaches      T(C): 36.4 (10-09-19 @ 10:00), Max: 36.8 (10-08-19 @ 21:44)  HR: 61 (10-09-19 @ 10:00) (61 - 71)  BP: 129/78 (10-09-19 @ 10:00) (129/78 - 168/94)  RR: 18 (10-09-19 @ 10:00) (18 - 20)  SpO2: 98% (10-09-19 @ 10:00) (95% - 98%)  Wt(kg): --Vital Signs Last 24 Hrs  T(C): 36.4 (09 Oct 2019 10:00), Max: 36.8 (08 Oct 2019 21:44)  T(F): 97.6 (09 Oct 2019 10:00), Max: 98.2 (08 Oct 2019 21:44)  HR: 61 (09 Oct 2019 10:00) (61 - 71)  BP: 129/78 (09 Oct 2019 10:00) (129/78 - 168/94)  BP(mean): --  RR: 18 (09 Oct 2019 10:00) (18 - 20)  SpO2: 98% (09 Oct 2019 10:00) (95% - 98%)  I&O's Summary    08 Oct 2019 07:01  -  09 Oct 2019 07:00  --------------------------------------------------------  IN: 0 mL / OUT: 2200 mL / NET: -2200 mL    09 Oct 2019 07:01  -  09 Oct 2019 11:20  --------------------------------------------------------  IN: 0 mL / OUT: 600 mL / NET: -600 mL          PHYSICAL EXAM:  GENERAL: NAD, obese male   HEAD:  Atraumatic, Normocephalic  NECK: Supple, No JVD, Normal thyroid  NERVOUS SYSTEM:  Alert & Oriented X3, Good concentration  CHEST/LUNG: pos air entry bilaterally unable to hear any crackles   HEART: Regular rate and rhythm  ABDOMEN: Soft, Nontender, very Protruberant; Bowel sounds present  EXTREMITIES: pos improving B/L leg swelling       Consultant(s) Notes Reviewed:  [x ] YES  [ ] NO  Care Discussed with Consultants/Other Providers [ x] YES  [ ] NO  Name of Consultant  LABS:                        11.7   8.75  )-----------( 233      ( 09 Oct 2019 05:46 )             37.4     10-09    139  |  106  |  17  ----------------------------<  103<H>  3.8   |  22  |  1.20    Ca    8.3<L>      09 Oct 2019 05:46  Phos  3.5     10-09  Mg     2.2     10-09    TPro  6.4  /  Alb  3.3  /  TBili  0.3  /  DBili  x   /  AST  19  /  ALT  25  /  AlkPhos  63  10-08        CAPILLARY BLOOD GLUCOSE                RADIOLOGY & ADDITIONAL TESTS:  EKG :     Imaging Personally Reviewed:  [ ] YES  [ ] NO  HEALTH ISSUES - PROBLEM Dx:  Prolonged QT interval: Prolonged QT interval  Need for prophylactic measure: Need for prophylactic measure  Anemia: Anemia  MUKESH (acute kidney injury): MUKESH (acute kidney injury)  Essential hypertension: Essential hypertension  Acute on chronic heart failure, unspecified heart failure type: Acute on chronic heart failure, unspecified heart failure type

## 2019-10-09 NOTE — PROGRESS NOTE ADULT - PROBLEM SELECTOR PLAN 3
prolonged QT on EKG to 524  no electrolyte abn noted, will c/t monitor and replete as needed    monitor on tele   repeat EKG is ordered

## 2019-10-09 NOTE — CONSULT NOTE ADULT - SUBJECTIVE AND OBJECTIVE BOX
N-[id number]    CHIEF COMPLAINT:  Patient is a 39y old  Male who presents with a chief complaint of SOB (08 Oct 2019 13:02)      HISTORY OF PRESENT ILLNESS:  JIM BOONE is a 39y Male patient with past medical history of *** presenting with ***.     Allergies    No Known Allergies    Intolerances    	    PAST MEDICAL & SURGICAL HISTORY:  HTN (hypertension)  Congestive heart failure  No significant past surgical history      FAMILY HISTORY:  No pertinent family history in first degree relatives      SOCIAL HISTORY:    [ ] Non-smoker  [ ] Smoker  [ ] Alcohol    REVIEW OF SYSTEMS:  CONSTITUTIONAL: No fever, weight loss, or fatigue  EYES: No eye pain, visual disturbances, or discharge  ENMT:  No difficulty hearing, tinnitus, vertigo; No sinus or throat pain  NECK: No pain or stiffness  RESPIRATORY: No cough, wheezing, chills or hemoptysis; No Shortness of Breath  CARDIOVASCULAR: No chest pain, palpitations, passing out, dizziness, or leg swelling  GASTROINTESTINAL: No abdominal or epigastric pain. No nausea, vomiting, or hematemesis; No diarrhea or constipation. No melena or hematochezia.  GENITOURINARY: No dysuria, frequency, hematuria, or incontinence  NEUROLOGICAL: No headaches, memory loss, loss of strength, numbness, or tremors  SKIN: No itching, burning, rashes, or lesions   LYMPH Nodes: No enlarged glands  ENDOCRINE: No heat or cold intolerance; No hair loss  MUSCULOSKELETAL: No joint pain or swelling; No muscle, back, or extremity pain  PSYCHIATRIC: No depression, anxiety, mood swings, or difficulty sleeping  HEME/LYMPH: No easy bruising, or bleeding gums  ALLERY AND IMMUNOLOGIC: No hives or eczema	    [ ] All others negative	  [ ] Unable to obtain    I&O's Summary    08 Oct 2019 07:01  -  09 Oct 2019 07:00  --------------------------------------------------------  IN: 0 mL / OUT: 2200 mL / NET: -2200 mL    09 Oct 2019 07:01  -  09 Oct 2019 09:48  --------------------------------------------------------  IN: 0 mL / OUT: 600 mL / NET: -600 mL        PHYSICAL EXAM:  Vital Signs Last 24 Hrs  T(C): 36.8 (09 Oct 2019 04:05), Max: 36.8 (08 Oct 2019 21:44)  T(F): 98.2 (09 Oct 2019 04:05), Max: 98.2 (08 Oct 2019 21:44)  HR: 66 (09 Oct 2019 04:05) (65 - 71)  BP: 135/83 (09 Oct 2019 04:05) (135/83 - 168/94)  BP(mean): --  RR: 20 (09 Oct 2019 04:05) (20 - 20)  SpO2: 96% (09 Oct 2019 04:05) (95% - 97%)  Appearance: Normal	  HEENT:   Normal oral mucosa, PERRL, EOMI	  Lymphatic: No lymphadenopathy  Cardiovascular: Normal S1 S2, No JVD, No murmurs, No edema  Respiratory: Lungs clear to auscultation	  Psychiatry: A & O x 3, Mood & affect appropriate  Gastrointestinal:  Soft, Non-tender, + BS	  Skin: No rashes, No ecchymoses, No cyanosis	  Neurologic: Non-focal  Extremities: Normal range of motion, No clubbing, cyanosis or edema  Vascular: Peripheral pulses palpable 2+ bilaterally    MEDICATIONS:  MEDICATIONS  (STANDING):  aspirin  chewable 81 milliGRAM(s) Oral daily  carvedilol 12.5 milliGRAM(s) Oral every 12 hours  enoxaparin Injectable 40 milliGRAM(s) SubCutaneous daily  ferrous    sulfate 325 milliGRAM(s) Oral daily  furosemide   Injectable 40 milliGRAM(s) IV Push two times a day  influenza   Vaccine 0.5 milliLiter(s) IntraMuscular once  lisinopril 20 milliGRAM(s) Oral daily  NIFEdipine XL 60 milliGRAM(s) Oral daily  simvastatin 20 milliGRAM(s) Oral at bedtime    MEDICATIONS  (PRN):      Home Medications:  aspirin 81 mg oral tablet, chewable: 1 tab(s) orally once a day (08 Oct 2019 04:31)  carvedilol 12.5 mg oral tablet: 1 tab(s) orally 2 times a day (08 Oct 2019 04:31)  furosemide 40 mg oral tablet: 1 tab(s) orally once a day (08 Oct 2019 04:31)  lisinopril 20 mg oral tablet: 1 tab(s) orally once a day (08 Oct 2019 04:31)  NIFEdipine 30 mg oral tablet, extended release: 1 tab(s) orally once a day (08 Oct 2019 04:31)  simvastatin 20 mg oral tablet: 1 tab(s) orally once a day (at bedtime) (08 Oct 2019 04:31)      LABS:	 	  CBC Full  -  ( 09 Oct 2019 05:46 )  WBC Count : 8.75 K/uL  Hemoglobin : 11.7 g/dL  Hematocrit : 37.4 %  Platelet Count - Automated : 233 K/uL  Mean Cell Volume : 69.8 fl  Mean Cell Hemoglobin : 21.8 pg  Mean Cell Hemoglobin Concentration : 31.3 gm/dL  Auto Neutrophil # : x  Auto Lymphocyte # : x  Auto Monocyte # : x  Auto Eosinophil # : x  Auto Basophil # : x  Auto Neutrophil % : x  Auto Lymphocyte % : x  Auto Monocyte % : x  Auto Eosinophil % : x  Auto Basophil % : x    10-09    139  |  106  |  17  ----------------------------<  103<H>  3.8   |  22  |  1.20  10-08    139  |  107  |  15  ----------------------------<  112<H>  3.6   |  22  |  1.12    Ca    8.3<L>      09 Oct 2019 05:46  Ca    8.2<L>      08 Oct 2019 07:27  Phos  3.5     10-09  Phos  2.8     10-08  Mg     2.2     10-09  Mg     2.1     10-08    TPro  6.4  /  Alb  3.3  /  TBili  0.3  /  DBili  x   /  AST  19  /  ALT  25  /  AlkPhos  63  10-08            proBNP:   Lipid Profile:   HgA1c:   TSH:     TELEMETRY: 	    ECG:  	  RADIOLOGY:  OTHER: 	    CARDIAC TESTING/STUDIES:    [ ] Echocardiogram:  [ ]  Catheterization:  [ ] Stress Test:  	  	  ASSESSMENT/PLAN: 	      Prasanth Rader MD, MPH, PEREZ  Cardiovascular Specialist Attending  [facility]  C: [phone]  E: [email or web address] Plans outlined in prior progress note until this consult is complete.    MRN-[id number]    CHIEF COMPLAINT:  Patient is a 39y old  Male who presents with a chief complaint of SOB (08 Oct 2019 13:02)      HISTORY OF PRESENT ILLNESS:  JIM BOONE is a 39y Male patient with past medical history of *** presenting with ***.     Allergies    No Known Allergies    Intolerances    	    PAST MEDICAL & SURGICAL HISTORY:  HTN (hypertension)  Congestive heart failure  No significant past surgical history      FAMILY HISTORY:  No pertinent family history in first degree relatives      SOCIAL HISTORY:    [ ] Non-smoker  [ ] Smoker  [ ] Alcohol    REVIEW OF SYSTEMS:  CONSTITUTIONAL: No fever, weight loss, or fatigue  EYES: No eye pain, visual disturbances, or discharge  ENMT:  No difficulty hearing, tinnitus, vertigo; No sinus or throat pain  NECK: No pain or stiffness  RESPIRATORY: No cough, wheezing, chills or hemoptysis; No Shortness of Breath  CARDIOVASCULAR: No chest pain, palpitations, passing out, dizziness, or leg swelling  GASTROINTESTINAL: No abdominal or epigastric pain. No nausea, vomiting, or hematemesis; No diarrhea or constipation. No melena or hematochezia.  GENITOURINARY: No dysuria, frequency, hematuria, or incontinence  NEUROLOGICAL: No headaches, memory loss, loss of strength, numbness, or tremors  SKIN: No itching, burning, rashes, or lesions   LYMPH Nodes: No enlarged glands  ENDOCRINE: No heat or cold intolerance; No hair loss  MUSCULOSKELETAL: No joint pain or swelling; No muscle, back, or extremity pain  PSYCHIATRIC: No depression, anxiety, mood swings, or difficulty sleeping  HEME/LYMPH: No easy bruising, or bleeding gums  ALLERY AND IMMUNOLOGIC: No hives or eczema	    [ ] All others negative	  [ ] Unable to obtain    I&O's Summary    08 Oct 2019 07:01  -  09 Oct 2019 07:00  --------------------------------------------------------  IN: 0 mL / OUT: 2200 mL / NET: -2200 mL    09 Oct 2019 07:01  -  09 Oct 2019 09:48  --------------------------------------------------------  IN: 0 mL / OUT: 600 mL / NET: -600 mL        PHYSICAL EXAM:  Vital Signs Last 24 Hrs  T(C): 36.8 (09 Oct 2019 04:05), Max: 36.8 (08 Oct 2019 21:44)  T(F): 98.2 (09 Oct 2019 04:05), Max: 98.2 (08 Oct 2019 21:44)  HR: 66 (09 Oct 2019 04:05) (65 - 71)  BP: 135/83 (09 Oct 2019 04:05) (135/83 - 168/94)  BP(mean): --  RR: 20 (09 Oct 2019 04:05) (20 - 20)  SpO2: 96% (09 Oct 2019 04:05) (95% - 97%)  Appearance: Normal	  HEENT:   Normal oral mucosa, PERRL, EOMI	  Lymphatic: No lymphadenopathy  Cardiovascular: Normal S1 S2, No JVD, No murmurs, No edema  Respiratory: Lungs clear to auscultation	  Psychiatry: A & O x 3, Mood & affect appropriate  Gastrointestinal:  Soft, Non-tender, + BS	  Skin: No rashes, No ecchymoses, No cyanosis	  Neurologic: Non-focal  Extremities: Normal range of motion, No clubbing, cyanosis or edema  Vascular: Peripheral pulses palpable 2+ bilaterally    MEDICATIONS:  MEDICATIONS  (STANDING):  aspirin  chewable 81 milliGRAM(s) Oral daily  carvedilol 12.5 milliGRAM(s) Oral every 12 hours  enoxaparin Injectable 40 milliGRAM(s) SubCutaneous daily  ferrous    sulfate 325 milliGRAM(s) Oral daily  furosemide   Injectable 40 milliGRAM(s) IV Push two times a day  influenza   Vaccine 0.5 milliLiter(s) IntraMuscular once  lisinopril 20 milliGRAM(s) Oral daily  NIFEdipine XL 60 milliGRAM(s) Oral daily  simvastatin 20 milliGRAM(s) Oral at bedtime    MEDICATIONS  (PRN):      Home Medications:  aspirin 81 mg oral tablet, chewable: 1 tab(s) orally once a day (08 Oct 2019 04:31)  carvedilol 12.5 mg oral tablet: 1 tab(s) orally 2 times a day (08 Oct 2019 04:31)  furosemide 40 mg oral tablet: 1 tab(s) orally once a day (08 Oct 2019 04:31)  lisinopril 20 mg oral tablet: 1 tab(s) orally once a day (08 Oct 2019 04:31)  NIFEdipine 30 mg oral tablet, extended release: 1 tab(s) orally once a day (08 Oct 2019 04:31)  simvastatin 20 mg oral tablet: 1 tab(s) orally once a day (at bedtime) (08 Oct 2019 04:31)      LABS:	 	  CBC Full  -  ( 09 Oct 2019 05:46 )  WBC Count : 8.75 K/uL  Hemoglobin : 11.7 g/dL  Hematocrit : 37.4 %  Platelet Count - Automated : 233 K/uL  Mean Cell Volume : 69.8 fl  Mean Cell Hemoglobin : 21.8 pg  Mean Cell Hemoglobin Concentration : 31.3 gm/dL  Auto Neutrophil # : x  Auto Lymphocyte # : x  Auto Monocyte # : x  Auto Eosinophil # : x  Auto Basophil # : x  Auto Neutrophil % : x  Auto Lymphocyte % : x  Auto Monocyte % : x  Auto Eosinophil % : x  Auto Basophil % : x    10-09    139  |  106  |  17  ----------------------------<  103<H>  3.8   |  22  |  1.20  10-08    139  |  107  |  15  ----------------------------<  112<H>  3.6   |  22  |  1.12    Ca    8.3<L>      09 Oct 2019 05:46  Ca    8.2<L>      08 Oct 2019 07:27  Phos  3.5     10-09  Phos  2.8     10-08  Mg     2.2     10-09  Mg     2.1     10-08    TPro  6.4  /  Alb  3.3  /  TBili  0.3  /  DBili  x   /  AST  19  /  ALT  25  /  AlkPhos  63  10-08            proBNP:   Lipid Profile:   HgA1c:   TSH:     TELEMETRY: 	    ECG:  	  RADIOLOGY:  OTHER: 	    CARDIAC TESTING/STUDIES:    [ ] Echocardiogram:  [ ]  Catheterization:  [ ] Stress Test:  	  	  ASSESSMENT/PLAN: 	      Prasanth Rader MD, MPH, PEREZ  Cardiovascular Specialist Attending  [facility]  C: [phone]  E: [email or web address]

## 2019-10-09 NOTE — PROGRESS NOTE ADULT - PROBLEM SELECTOR PLAN 1
hx of CHF, ?2/2 uncontrolled HTN. No TTE on EMR. Elevated BNP, SOB and LE edema c/w likely CHF exacerbation   c/w lasix 40 IV BID for now, monitor electrolytes closely   daily weights/current wt = 165   strict I/Os  check TTE is pend   monitor on tele  c/w statin  Cont Coreg/Lisinopril which were pt's home meds  might DC Tele to day if no events hx of CHF, ?2/2 uncontrolled HTN. No TTE on EMR. Elevated BNP, SOB and LE edema c/w likely CHF exacerbation   c/w lasix 40 IV BID for now, monitor electrolytes closely / might change to oral if pt continues to improve   daily weights/current wt = 165   strict I/Os  check TTE is pend   monitor on tele  Cont Coreg/Lisinopril/Statin which were pt's home meds  might DC Tele to day if no events  cont to monitor urine output

## 2019-10-09 NOTE — PROGRESS NOTE ADULT - PROBLEM SELECTOR PLAN 5
Iron def anemia   CW  ferrous sulfate   Pt will need to f.up with PCP for further work up   Fecal occult test is ordered

## 2019-10-09 NOTE — PROVIDER CONTACT NOTE (OTHER) - ACTION/TREATMENT ORDERED:
NP notified. continue to monitor. no labs ordered at this time.
provider made aware. Provider to come assess pt at bedside. will continue to monitor. awaiting further instructions.

## 2019-10-10 ENCOUNTER — TRANSCRIPTION ENCOUNTER (OUTPATIENT)
Age: 39
End: 2019-10-10

## 2019-10-10 VITALS
HEART RATE: 65 BPM | TEMPERATURE: 98 F | RESPIRATION RATE: 16 BRPM | OXYGEN SATURATION: 97 % | SYSTOLIC BLOOD PRESSURE: 130 MMHG | DIASTOLIC BLOOD PRESSURE: 81 MMHG

## 2019-10-10 LAB
ANION GAP SERPL CALC-SCNC: 10 MMOL/L — SIGNIFICANT CHANGE UP (ref 5–17)
BUN SERPL-MCNC: 20 MG/DL — SIGNIFICANT CHANGE UP (ref 7–23)
CALCIUM SERPL-MCNC: 8.4 MG/DL — SIGNIFICANT CHANGE UP (ref 8.4–10.5)
CHLORIDE SERPL-SCNC: 103 MMOL/L — SIGNIFICANT CHANGE UP (ref 96–108)
CO2 SERPL-SCNC: 24 MMOL/L — SIGNIFICANT CHANGE UP (ref 22–31)
CREAT SERPL-MCNC: 1.17 MG/DL — SIGNIFICANT CHANGE UP (ref 0.5–1.3)
GLUCOSE SERPL-MCNC: 96 MG/DL — SIGNIFICANT CHANGE UP (ref 70–99)
HCT VFR BLD CALC: 39 % — SIGNIFICANT CHANGE UP (ref 39–50)
HGB BLD-MCNC: 12.1 G/DL — LOW (ref 13–17)
MCHC RBC-ENTMCNC: 21.6 PG — LOW (ref 27–34)
MCHC RBC-ENTMCNC: 31 GM/DL — LOW (ref 32–36)
MCV RBC AUTO: 69.8 FL — LOW (ref 80–100)
NRBC # BLD: 0 /100 WBCS — SIGNIFICANT CHANGE UP (ref 0–0)
PLATELET # BLD AUTO: 244 K/UL — SIGNIFICANT CHANGE UP (ref 150–400)
POTASSIUM SERPL-MCNC: 4 MMOL/L — SIGNIFICANT CHANGE UP (ref 3.5–5.3)
POTASSIUM SERPL-SCNC: 4 MMOL/L — SIGNIFICANT CHANGE UP (ref 3.5–5.3)
RBC # BLD: 5.59 M/UL — SIGNIFICANT CHANGE UP (ref 4.2–5.8)
RBC # FLD: 17.5 % — HIGH (ref 10.3–14.5)
SODIUM SERPL-SCNC: 137 MMOL/L — SIGNIFICANT CHANGE UP (ref 135–145)
WBC # BLD: 9.55 K/UL — SIGNIFICANT CHANGE UP (ref 3.8–10.5)
WBC # FLD AUTO: 9.55 K/UL — SIGNIFICANT CHANGE UP (ref 3.8–10.5)

## 2019-10-10 PROCEDURE — 84484 ASSAY OF TROPONIN QUANT: CPT

## 2019-10-10 PROCEDURE — 82272 OCCULT BLD FECES 1-3 TESTS: CPT

## 2019-10-10 PROCEDURE — 84100 ASSAY OF PHOSPHORUS: CPT

## 2019-10-10 PROCEDURE — 84540 ASSAY OF URINE/UREA-N: CPT

## 2019-10-10 PROCEDURE — 99239 HOSP IP/OBS DSCHRG MGMT >30: CPT

## 2019-10-10 PROCEDURE — 83735 ASSAY OF MAGNESIUM: CPT

## 2019-10-10 PROCEDURE — 82746 ASSAY OF FOLIC ACID SERUM: CPT

## 2019-10-10 PROCEDURE — 82728 ASSAY OF FERRITIN: CPT

## 2019-10-10 PROCEDURE — 80053 COMPREHEN METABOLIC PANEL: CPT

## 2019-10-10 PROCEDURE — 83540 ASSAY OF IRON: CPT

## 2019-10-10 PROCEDURE — 82570 ASSAY OF URINE CREATININE: CPT

## 2019-10-10 PROCEDURE — 82607 VITAMIN B-12: CPT

## 2019-10-10 PROCEDURE — 80048 BASIC METABOLIC PNL TOTAL CA: CPT

## 2019-10-10 PROCEDURE — 83550 IRON BINDING TEST: CPT

## 2019-10-10 PROCEDURE — 83880 ASSAY OF NATRIURETIC PEPTIDE: CPT

## 2019-10-10 PROCEDURE — 93005 ELECTROCARDIOGRAM TRACING: CPT

## 2019-10-10 PROCEDURE — 84300 ASSAY OF URINE SODIUM: CPT

## 2019-10-10 PROCEDURE — C8929: CPT

## 2019-10-10 PROCEDURE — 99285 EMERGENCY DEPT VISIT HI MDM: CPT | Mod: 25

## 2019-10-10 PROCEDURE — 85027 COMPLETE CBC AUTOMATED: CPT

## 2019-10-10 PROCEDURE — 71045 X-RAY EXAM CHEST 1 VIEW: CPT

## 2019-10-10 RX ORDER — ASPIRIN/CALCIUM CARB/MAGNESIUM 324 MG
1 TABLET ORAL
Qty: 0 | Refills: 0 | DISCHARGE

## 2019-10-10 RX ORDER — FUROSEMIDE 40 MG
1 TABLET ORAL
Qty: 0 | Refills: 0 | DISCHARGE

## 2019-10-10 RX ORDER — NIFEDIPINE 30 MG
1 TABLET, EXTENDED RELEASE 24 HR ORAL
Qty: 0 | Refills: 0 | DISCHARGE

## 2019-10-10 RX ORDER — CARVEDILOL PHOSPHATE 80 MG/1
1 CAPSULE, EXTENDED RELEASE ORAL
Qty: 0 | Refills: 0 | DISCHARGE

## 2019-10-10 RX ORDER — LISINOPRIL 2.5 MG/1
1 TABLET ORAL
Qty: 0 | Refills: 0 | DISCHARGE

## 2019-10-10 RX ORDER — FUROSEMIDE 40 MG
40 TABLET ORAL DAILY
Refills: 0 | Status: DISCONTINUED | OUTPATIENT
Start: 2019-10-10 | End: 2019-10-10

## 2019-10-10 RX ORDER — LISINOPRIL 2.5 MG/1
30 TABLET ORAL DAILY
Refills: 0 | Status: DISCONTINUED | OUTPATIENT
Start: 2019-10-10 | End: 2019-10-10

## 2019-10-10 RX ORDER — SIMVASTATIN 20 MG/1
1 TABLET, FILM COATED ORAL
Qty: 0 | Refills: 0 | DISCHARGE

## 2019-10-10 RX ADMIN — Medication 60 MILLIGRAM(S): at 05:33

## 2019-10-10 RX ADMIN — Medication 40 MILLIGRAM(S): at 05:33

## 2019-10-10 RX ADMIN — LISINOPRIL 30 MILLIGRAM(S): 2.5 TABLET ORAL at 11:48

## 2019-10-10 RX ADMIN — Medication 81 MILLIGRAM(S): at 11:47

## 2019-10-10 RX ADMIN — Medication 325 MILLIGRAM(S): at 11:48

## 2019-10-10 RX ADMIN — LISINOPRIL 20 MILLIGRAM(S): 2.5 TABLET ORAL at 05:33

## 2019-10-10 RX ADMIN — CARVEDILOL PHOSPHATE 12.5 MILLIGRAM(S): 80 CAPSULE, EXTENDED RELEASE ORAL at 05:33

## 2019-10-10 NOTE — PROGRESS NOTE ADULT - SUBJECTIVE AND OBJECTIVE BOX
Patient is a 39y old  Male who presents with a chief complaint of SOB (09 Oct 2019 11:19)      INTERVAL HPI/OVERNIGHT EVENTS:   Patient states to feel better , no SOB , and urinates well.  Patient is ready to go home         Medications:MEDICATIONS  (STANDING):  aspirin  chewable 81 milliGRAM(s) Oral daily  carvedilol 12.5 milliGRAM(s) Oral every 12 hours  enoxaparin Injectable 40 milliGRAM(s) SubCutaneous daily  ferrous    sulfate 325 milliGRAM(s) Oral daily  furosemide    Tablet 40 milliGRAM(s) Oral daily  influenza   Vaccine 0.5 milliLiter(s) IntraMuscular once  lisinopril 30 milliGRAM(s) Oral daily  simvastatin 20 milliGRAM(s) Oral at bedtime    MEDICATIONS  (PRN):      Allergies: Allergies    No Known Allergies        REVIEW OF SYSTEMS:  CONSTITUTIONAL: No fever  RESPIRATORY: No cough, No current shortness of breath ( on walking)   CARDIOVASCULAR: No chest pain, palpitations, dizziness, Pos improved B/L  leg swelling  GASTROINTESTINAL: No abdominal pain. No vomiting,  No diarrhea. No melena  NEUROLOGICAL: No headaches    T(C): 36.7 (10-10-19 @ 05:36), Max: 36.9 (10-09-19 @ 17:53)  HR: 64 (10-10-19 @ 05:36) (60 - 75)  BP: 124/76 (10-10-19 @ 05:36) (123/73 - 132/78)  RR: 18 (10-10-19 @ 05:36) (18 - 19)  SpO2: 98% (10-10-19 @ 05:36) (94% - 98%)  Wt(kg): --Vital Signs Last 24 Hrs  T(C): 36.7 (10 Oct 2019 05:36), Max: 36.9 (09 Oct 2019 17:53)  T(F): 98 (10 Oct 2019 05:36), Max: 98.4 (09 Oct 2019 17:53)  HR: 64 (10 Oct 2019 05:36) (60 - 75)  BP: 124/76 (10 Oct 2019 05:36) (123/73 - 132/78)  BP(mean): --  RR: 18 (10 Oct 2019 05:36) (18 - 19)  SpO2: 98% (10 Oct 2019 05:36) (94% - 98%)  I&O's Summary    09 Oct 2019 07:01  -  10 Oct 2019 07:00  --------------------------------------------------------  IN: 0 mL / OUT: 2000 mL / NET: -2000 mL          PHYSICAL EXAM:  GENERAL: NAD, obese male   HEAD:  Atraumatic, Normocephalic  NECK: Supple, No JVD, Normal thyroid  NERVOUS SYSTEM:  Alert & Oriented X3, Good concentration  CHEST/LUNG: pos air entry bilaterally unable to hear any crackles   HEART: Regular rate and rhythm  ABDOMEN: Soft, Nontender, very Protruberant; Bowel sounds present  EXTREMITIES: pos improving B/L leg swelling     Consultant(s) Notes Reviewed:  [x ] YES  [ ] NO  Care Discussed with Consultants/Other Providers [ x] YES  [ ] NO  Name of Consultant  LABS:                        12.1   9.55  )-----------( 244      ( 10 Oct 2019 06:56 )             39.0     10-10    137  |  103  |  20  ----------------------------<  96  4.0   |  24  |  1.17    Ca    8.4      10 Oct 2019 06:56  Phos  3.5     10-09  Mg     2.2     10-09          CAPILLARY BLOOD GLUCOSE                RADIOLOGY & ADDITIONAL TESTS:  EKG :     Imaging Personally Reviewed:  [ ] YES  [ ] NO  HEALTH ISSUES - PROBLEM Dx:  ECG abnormal: ECG abnormal  Prolonged QT interval: Prolonged QT interval  Need for prophylactic measure: Need for prophylactic measure  Anemia: Anemia  MUKESH (acute kidney injury): MUKESH (acute kidney injury)  Essential hypertension: Essential hypertension  Acute on chronic heart failure, unspecified heart failure type: Acute on chronic heart failure, unspecified heart failure type

## 2019-10-10 NOTE — DISCHARGE NOTE PROVIDER - CARE PROVIDER_API CALL
Prasanth Rader (MD)  Internal Medicine  [address] [place] [address], [address] [place] [address]  Phone: [phone]  Fax: [phone]  Follow Up Time:     medicine Clinic,   [phone]  Phone: (   )    -  Fax: (   )    -  Follow Up Time:

## 2019-10-10 NOTE — DISCHARGE NOTE PROVIDER - HOSPITAL COURSE
failure and abnormal ECG.           Problem/Plan - 1:    ·  Problem: Acute on chronic heart failure, unspecified heart failure type.  Plan: change to Lasix 40 mg oral daily from  lasix IV    Cont Coreg/Lisinopril is increased to 30 mg oral daily/Statin which were pt's home meds    DC today to home     Pt will need outpatient PCP and Cardio f/up within one week of hospital dc.  Pt wants to change care from [facility] to [facility] / Pt will need to call for an apt for PCP and Cardio and the phone number will be provided to pt     Compliance is advised     Pt will be given a 30 day supply to cover until seen by PCP / Cardiologist     DC time 40mns.          Problem/Plan - 2:    ·  Problem: Essential hypertension.  Plan: c/w coreg / Lisinopril/ Lasix.          Problem/Plan - 3:    ·  Problem: Prolonged QT interval.  Plan: prolonged QT on EKG to 524/ cont to monitor     As per Cardio / no further ischemic work up.          Problem/Plan - 4:    ·  Problem: MUKESH (acute kidney injury).  Plan: Resolved     c/t monitor.          Problem/Plan - 5:    ·  Problem: Anemia.  Plan: Iron def anemia     CW  ferrous sulfate     Pt will need to f.up with PCP for further work up     Fecal occult test is ordered.          Problem/Plan - 6:    Problem: ECG abnormal.         Problem/Plan - 7: failure and abnormal ECG.           Problem/Plan - 1:    ·  Problem: Acute on chronic heart failure, unspecified heart failure type.  Plan: change to Lasix 40 mg oral daily from  lasix IV    Cont Coreg/Lisinopril is increased to 30 mg oral daily/Statin which were pt's home meds    DC today to home     Pt will need outpatient PCP and Cardio f/up within one week of hospital dc.  Pt wants to change care from [facility] to [facility] / Pt will need to call for an apt for PCP and Cardio and the phone number will be provided to pt     Compliance is advised     Pt will be given a 30 day supply to cover until seen by PCP / Cardiologist     DC time 40mns.          Problem/Plan - 2:    ·  Problem: Essential hypertension.  Plan: c/w coreg / Lisinopril/ Lasix.          Problem/Plan - 3:    ·  Problem: Prolonged QT interval.  Plan: prolonged QT on EKG to 524/ cont to monitor     As per Cardio / no further ischemic work up.          Problem/Plan - 4:    ·  Problem: MUKESH (acute kidney injury).  Plan: Resolved     c/t monitor.          Problem/Plan - 5:    ·  Problem: Anemia.  Plan: Iron def anemia     CW  ferrous sulfate     Pt will need to f.up with PCP for further work up     Fecal occult test is ordered.             Discharged home [facility] this month 39M with PMH of CHF, HTN p/w SOB and LE swelling, admitted with acute on chronic heart failure ( HFrEF of 40%)  and abnormal ECG.   Patient was started on IV Diuresis with Lasix which was changed to oral lasix and his home  medications were restarted and adjusted.  Patient has clinically improved and has seen the cardiologist with no further ischemic work up warranted .  Compliance is stressed to patient.           Problem/Plan - 1:    ·  Problem: Acute on chronic heart failure, unspecified heart failure type.  Plan: change to Lasix 40 mg oral daily from  lasix IV    Cont Coreg/Lisinopril is increased to 30 mg oral daily/Statin which were pt's home meds    DC today to home     Pt will need outpatient PCP and Cardio f/up within one week of hospital dc.  Pt wants to change care from [facility] to [facility] / Pt will need to call for an apt for PCP and Cardio and the phone number will be provided to pt     Compliance is advised     Pt will be given a 30 day supply to cover until seen by PCP / Cardiologist     DC time 40mns.          Problem/Plan - 2:    ·  Problem: Essential hypertension.  Plan: c/w coreg / Lisinopril/ Lasix.          Problem/Plan - 3:    ·  Problem: Prolonged QT interval.  Plan: prolonged QT on EKG to 524/ cont to monitor     As per Cardio / no further ischemic work up.          Problem/Plan - 4:    ·  Problem: MUKESH (acute kidney injury).  Plan: Resolved     c/t monitor.          Problem/Plan - 5:    ·  Problem: Anemia.  Plan: Iron def anemia     CW  ferrous sulfate     Pt will need to f.up with PCP for further work up     Fecal occult test is ordered.             Discharged home to follow up with [facility] this month

## 2019-10-10 NOTE — DISCHARGE NOTE PROVIDER - NSDCCPCAREPLAN_GEN_ALL_CORE_FT
PRINCIPAL DISCHARGE DIAGNOSIS  Diagnosis: CHF exacerbation  Assessment and Plan of Treatment: Weigh yourself daily.  If you gain 3lbs in 3 days, or 5lbs in a week call your Health Care Provider.  Do not eat or drink foods containing more than 2000mg of salt (sodium) in your diet every day.  Call your Health Care Provider if you have any swelling or increased swelling in your feet, ankles, and/or stomach.  Take all of your medication as directed.  If you become dizzy call your Health Care Provider.        SECONDARY DISCHARGE DIAGNOSES  Diagnosis: HTN (hypertension)  Assessment and Plan of Treatment: Follow up with your medical doctor to establish long term blood pressure treatment goals.      Diagnosis: Anemia  Assessment and Plan of Treatment: Follow [facility] with [facility] for managment. Continue iron    Diagnosis: Prolonged QT interval  Assessment and Plan of Treatment: Follow up with cardiology for managment

## 2019-10-10 NOTE — PROGRESS NOTE ADULT - PROBLEM SELECTOR PLAN 1
change to Lasix 40 mg oral daily from  lasix IV  Cont Coreg/Lisinopril is increased to 30 mg oral daily/Statin which were pt's home meds  DC today to home   Pt will need outpatient PCP and Cardio f/up within one week of hospital dc.  Pt wants to change care from [facility] to [facility] / Pt will need to call for an apt for PCP and Cardio and the phone number will be provided to pt   Compliance is advised   Pt will be given a 30 day supply to cover until seen by PCP / Cardiologist   DC time 40mns

## 2019-10-10 NOTE — DISCHARGE NOTE NURSING/CASE MANAGEMENT/SOCIAL WORK - PATIENT PORTAL LINK FT
You can access the FollowMyHealth Patient Portal offered by [facility] by registering at the following website: [email or web address]. By joining [facility]’s FollowMyHealth portal, you will also be able to view your health information using other applications (apps) compatible with our system.

## 2019-10-10 NOTE — PROGRESS NOTE ADULT - ATTENDING COMMENTS
Melody Rich   hospitalist   
Melody Rich   hospitalist   
Patient will be dc on current medication dose from the hospital / NO Nifedipine / F/UP within one week with PCP/Cardiologist     Melody Rich   hospitalist   [phone]

## 2019-10-10 NOTE — PROGRESS NOTE ADULT - ASSESSMENT
39M with PMH of CHF, HTN p/w SOB and LE swelling, admitted with acute on chronic heart failure and abnormal ECG.

## 2019-10-10 NOTE — CHART NOTE - NSCHARTNOTEFT_GEN_A_CORE
Pt medically cleared for discharge by Dr. Rich . Continue current medications, follow up with medicine and cardiology this month

## 2019-10-10 NOTE — PROGRESS NOTE ADULT - SUBJECTIVE AND OBJECTIVE BOX
Patient had NSVT ~25 beats in the setting of aggressive diuresis. But fortunately he has already had a cardiac cath complete in March 2019 which was completely normal, which is why we do not need to proceed with any additional ischemic evaluation. I would de-escalate the diuresis to PO furosemide 40 mg daily.     Additionally, reviewed the ECHO with EF 40% and suggest that we discontinue the nifedipine which I already did but he was given his medications at 5 am before I had a chance to discontinue it. We can increase his lisinopril to 40 mg but likely 40 mg daily will be needed in addition to the carvedilol 12.5 mg BID.     No further workup is needed from a cardiac standpoint because he has already gotten a complete and thorough workup a few months ago. He is prepared for discharge with follow up. I would discharge on at least 30 mg lisinopril, carvedilol 12.5 mg BID and Furosamide     Prasanth Rader MD, MPH, PEREZ, RPVI, FACC  Cardiovascular Specialist   [facility]  c: [phone]  e: [email or web address]

## 2019-10-10 NOTE — DISCHARGE NOTE PROVIDER - CARE PROVIDERS DIRECT ADDRESSES
[email or web address]

## 2019-10-10 NOTE — DISCHARGE NOTE PROVIDER - PROVIDER TOKENS
PROVIDER:[TOKEN:[[id number]]],FREE:[LAST:[[facility]icine Clinic],PHONE:[(   )    -],FAX:[(   )    -],ADDRESS:[[phone]]]

## 2019-11-25 PROBLEM — I10 ESSENTIAL (PRIMARY) HYPERTENSION: Chronic | Status: ACTIVE | Noted: 2019-10-08

## 2019-11-25 PROBLEM — Z00.00 ENCOUNTER FOR PREVENTIVE HEALTH EXAMINATION: Status: ACTIVE | Noted: 2019-11-25

## 2019-11-25 PROBLEM — I50.9 HEART FAILURE, UNSPECIFIED: Chronic | Status: ACTIVE | Noted: 2019-10-08

## 2019-11-25 RX ORDER — ASPIRIN 81 MG
81 TABLET, DELAYED RELEASE (ENTERIC COATED) ORAL DAILY
Refills: 5 | Status: ACTIVE | COMMUNITY

## 2019-12-27 ENCOUNTER — APPOINTMENT (OUTPATIENT)
Dept: CARDIOLOGY | Facility: CLINIC | Age: 39
End: 2019-12-27
Payer: COMMERCIAL

## 2019-12-27 VITALS
DIASTOLIC BLOOD PRESSURE: 125 MMHG | HEART RATE: 77 BPM | BODY MASS INDEX: 38.36 KG/M2 | HEIGHT: 76 IN | SYSTOLIC BLOOD PRESSURE: 183 MMHG | OXYGEN SATURATION: 97 % | WEIGHT: 315 LBS

## 2019-12-27 PROCEDURE — 93000 ELECTROCARDIOGRAM COMPLETE: CPT

## 2019-12-27 PROCEDURE — 99215 OFFICE O/P EST HI 40 MIN: CPT

## 2019-12-27 RX ORDER — LISINOPRIL 30 MG/1
30 TABLET ORAL DAILY
Qty: 90 | Refills: 1 | Status: ACTIVE | COMMUNITY
Start: 1900-01-01 | End: 1900-01-01

## 2019-12-27 RX ORDER — SIMVASTATIN 20 MG/1
20 TABLET, FILM COATED ORAL
Qty: 90 | Refills: 1 | Status: ACTIVE | COMMUNITY
Start: 1900-01-01 | End: 1900-01-01

## 2019-12-27 RX ORDER — ASPIRIN ENTERIC COATED TABLETS 81 MG 81 MG/1
81 TABLET, DELAYED RELEASE ORAL
Qty: 90 | Refills: 3 | Status: ACTIVE | COMMUNITY
Start: 2019-12-27 | End: 1900-01-01

## 2019-12-27 RX ORDER — FUROSEMIDE 40 MG/1
40 TABLET ORAL DAILY
Qty: 90 | Refills: 1 | Status: ACTIVE | COMMUNITY
Start: 1900-01-01 | End: 1900-01-01

## 2020-01-01 ENCOUNTER — NON-APPOINTMENT (OUTPATIENT)
Age: 40
End: 2020-01-01

## 2020-01-01 NOTE — DISCUSSION/SUMMARY
[FreeTextEntry1] : 39 year-old obese man presents after recent admission for heart failure and elevated blood pressure.\par 1. Symptoms have recurred after he ran out of his medications. Blood pressure significantly elevated. The patient refused to go to the emergency room. Restart carvedilol, lisinopril, and lasix.\par 2. Return to the office in approximately one week. Will attempt to obtain old records including cardiac catheterization at [facility].\par 3. Referral for sleep study.

## 2020-01-01 NOTE — HISTORY OF PRESENT ILLNESS
[FreeTextEntry1] : 39 year-old obese man presents after a recent hospitalization. Mr. Vuong was admitted to [facility] with dyspnea on exertion and volume overload. He reports that he was diagnosed with elevated blood pressure and nonischemic cardiomyopathy approximately two years ago. His insurance has recently changed and he changed to [place] for his medical care. Mr. Vuong was diuresed and restarted on his blood pressure medications while in the hospital. He has run out of his blood pressure mediations approximately one month ago. He reports that he has started to experience dyspnea on exertion after walking half a block and lower extremity edema. He denies chest discomfort, denies palpitations, denies dizziness, and denies syncope or headaches.

## 2020-01-01 NOTE — PHYSICAL EXAM
[General Appearance - Well Developed] : well developed [Normal Appearance] : normal appearance [General Appearance - Well Nourished] : well nourished [No Deformities] : no deformities [Well Groomed] : well groomed [Normal Conjunctiva] : the conjunctiva exhibited no abnormalities [General Appearance - In No Acute Distress] : no acute distress [Eyelids - No Xanthelasma] : the eyelids demonstrated no xanthelasmas [Normal Oral Mucosa] : normal oral mucosa [No Oral Pallor] : no oral pallor [No Oral Cyanosis] : no oral cyanosis [Normal Jugular Venous A Waves Present] : normal jugular venous A waves present [Heart Rate And Rhythm] : heart rate and rhythm were normal [Normal Jugular Venous V Waves Present] : normal jugular venous V waves present [No Jugular Venous Peterson A Waves] : no jugular venous peterson A waves [Heart Sounds] : normal S1 and S2 [Murmurs] : no murmurs present [Respiration, Rhythm And Depth] : normal respiratory rhythm and effort [Exaggerated Use Of Accessory Muscles For Inspiration] : no accessory muscle use [Abdomen Soft] : soft [Auscultation Breath Sounds / Voice Sounds] : lungs were clear to auscultation bilaterally [Abdomen Tenderness] : non-tender [Gait - Sufficient For Exercise Testing] : the gait was sufficient for exercise testing [Abdomen Mass (___ Cm)] : no abdominal mass palpated [Abnormal Walk] : normal gait [Nail Clubbing] : no clubbing of the fingernails [Cyanosis, Localized] : no localized cyanosis [Petechial Hemorrhages (___cm)] : no petechial hemorrhages [] : no rash [Skin Color & Pigmentation] : normal skin color and pigmentation [No Venous Stasis] : no venous stasis [Skin Lesions] : no skin lesions [No Skin Ulcers] : no skin ulcer [No Xanthoma] : no  xanthoma was observed [Oriented To Time, Place, And Person] : oriented to person, place, and time [Mood] : the mood was normal [Affect] : the affect was normal [No Anxiety] : not feeling anxious

## 2020-01-03 ENCOUNTER — APPOINTMENT (OUTPATIENT)
Dept: CARDIOLOGY | Facility: CLINIC | Age: 40
End: 2020-01-03
Payer: COMMERCIAL

## 2020-01-03 ENCOUNTER — NON-APPOINTMENT (OUTPATIENT)
Age: 40
End: 2020-01-03

## 2020-01-03 VITALS
HEART RATE: 65 BPM | OXYGEN SATURATION: 99 % | SYSTOLIC BLOOD PRESSURE: 149 MMHG | DIASTOLIC BLOOD PRESSURE: 92 MMHG | HEIGHT: 76 IN

## 2020-01-03 DIAGNOSIS — I51.9 HEART DISEASE, UNSPECIFIED: ICD-10-CM

## 2020-01-03 DIAGNOSIS — I42.9 CARDIOMYOPATHY, UNSPECIFIED: ICD-10-CM

## 2020-01-03 DIAGNOSIS — I10 ESSENTIAL (PRIMARY) HYPERTENSION: ICD-10-CM

## 2020-01-03 PROCEDURE — 93000 ELECTROCARDIOGRAM COMPLETE: CPT

## 2020-01-03 PROCEDURE — 99215 OFFICE O/P EST HI 40 MIN: CPT

## 2020-01-03 NOTE — PHYSICAL EXAM
[Well Groomed] : well groomed [General Appearance - Well Developed] : well developed [Normal Appearance] : normal appearance [No Deformities] : no deformities [General Appearance - Well Nourished] : well nourished [Normal Conjunctiva] : the conjunctiva exhibited no abnormalities [General Appearance - In No Acute Distress] : no acute distress [Eyelids - No Xanthelasma] : the eyelids demonstrated no xanthelasmas [Normal Oral Mucosa] : normal oral mucosa [No Oral Pallor] : no oral pallor [No Oral Cyanosis] : no oral cyanosis [Normal Jugular Venous A Waves Present] : normal jugular venous A waves present [Normal Jugular Venous V Waves Present] : normal jugular venous V waves present [Respiration, Rhythm And Depth] : normal respiratory rhythm and effort [No Jugular Venous Peterson A Waves] : no jugular venous peterson A waves [Exaggerated Use Of Accessory Muscles For Inspiration] : no accessory muscle use [Auscultation Breath Sounds / Voice Sounds] : lungs were clear to auscultation bilaterally [Heart Rate And Rhythm] : heart rate and rhythm were normal [Heart Sounds] : normal S1 and S2 [Murmurs] : no murmurs present [Abdomen Soft] : soft [Abdomen Tenderness] : non-tender [Abnormal Walk] : normal gait [Abdomen Mass (___ Cm)] : no abdominal mass palpated [Gait - Sufficient For Exercise Testing] : the gait was sufficient for exercise testing [Nail Clubbing] : no clubbing of the fingernails [Cyanosis, Localized] : no localized cyanosis [Petechial Hemorrhages (___cm)] : no petechial hemorrhages [Skin Color & Pigmentation] : normal skin color and pigmentation [] : no rash [Skin Lesions] : no skin lesions [No Venous Stasis] : no venous stasis [Oriented To Time, Place, And Person] : oriented to person, place, and time [No Skin Ulcers] : no skin ulcer [No Xanthoma] : no  xanthoma was observed [Affect] : the affect was normal [No Anxiety] : not feeling anxious [Mood] : the mood was normal

## 2020-01-04 PROBLEM — I10 HYPERTENSION: Status: ACTIVE | Noted: 2019-11-25

## 2020-01-04 PROBLEM — I51.9 LEFT VENTRICULAR DYSFUNCTION: Status: ACTIVE | Noted: 2019-12-27

## 2020-01-04 PROBLEM — I42.9 CARDIOMYOPATHY: Status: ACTIVE | Noted: 2020-01-01

## 2020-01-04 NOTE — DISCUSSION/SUMMARY
[FreeTextEntry1] : 39 year-old obese man presents after recent admission for heart failure and elevated blood pressure.\par 1. Symptoms recurred after he ran out of his medications. Blood pressure significantly improved on carvedilol, lisinopril, and lasix. Continue lasix 40 mg daily. The need to check daily weights and daily BPs was emphasized.\par 2. Referral for sleep study. Will attempt to obtain old records including cardiac catheterization.\par 3. Follow-up in 3-4 weeks. Check labwork prior to follow-up appointment.

## 2020-01-04 NOTE — HISTORY OF PRESENT ILLNESS
[FreeTextEntry1] : 39 year-old obese man presents for a follow-up appointment. Mr. Vuong was admitted to [facility] with dyspnea on exertion and volume overload. He reports that he was diagnosed with elevated blood pressure and nonischemic cardiomyopathy approximately two years ago. His insurance has recently changed and he changed to [facility] for his medical care. Mr. Vuong was diuresed and restarted on his blood pressure medications while in the hospital. He has run out of his blood pressure mediations approximately one month ago. He reports that he has started to experience dyspnea on exertion after walking half a block and lower extremity edema. He denies chest discomfort, denies palpitations, denies dizziness, and denies syncope or headaches. The patient was restarted on his BP meds and furosemide. He reports that the symptoms of dyspnea are improving. Lower extremity edema has improved. He did not take coreg on the day of his appointment. Home systolic BPs have been in the 130s.

## 2020-01-04 NOTE — REASON FOR VISIT
[Hypertension] : hypertension [Follow-Up - Clinic] : a clinic follow-up of [Heart Failure] : congestive heart failure

## 2020-01-10 ENCOUNTER — APPOINTMENT (OUTPATIENT)
Dept: CARDIOLOGY | Facility: CLINIC | Age: 40
End: 2020-01-10

## 2020-01-16 ENCOUNTER — EMERGENCY (EMERGENCY)
Facility: HOSPITAL | Age: 40
LOS: 1 days | Discharge: HOME | End: 2020-01-16
Admitting: EMERGENCY MEDICINE
Payer: COMMERCIAL

## 2020-01-16 VITALS
HEART RATE: 76 BPM | SYSTOLIC BLOOD PRESSURE: 178 MMHG | OXYGEN SATURATION: 99 % | DIASTOLIC BLOOD PRESSURE: 116 MMHG | RESPIRATION RATE: 19 BRPM

## 2020-01-16 VITALS
RESPIRATION RATE: 24 BRPM | DIASTOLIC BLOOD PRESSURE: 56 MMHG | TEMPERATURE: 100 F | SYSTOLIC BLOOD PRESSURE: 133 MMHG | OXYGEN SATURATION: 95 % | HEART RATE: 95 BPM

## 2020-01-16 LAB
ALBUMIN SERPL ELPH-MCNC: 4 G/DL — SIGNIFICANT CHANGE UP (ref 3.3–5)
ALP SERPL-CCNC: 73 U/L — SIGNIFICANT CHANGE UP (ref 40–120)
ALT FLD-CCNC: 14 U/L — SIGNIFICANT CHANGE UP (ref 4–41)
ANION GAP SERPL CALC-SCNC: 13 MMO/L — SIGNIFICANT CHANGE UP (ref 7–14)
APPEARANCE UR: CLEAR — SIGNIFICANT CHANGE UP
AST SERPL-CCNC: 27 U/L — SIGNIFICANT CHANGE UP (ref 4–40)
BACTERIA # UR AUTO: NEGATIVE — SIGNIFICANT CHANGE UP
BASOPHILS # BLD AUTO: 0.05 K/UL — SIGNIFICANT CHANGE UP (ref 0–0.2)
BASOPHILS NFR BLD AUTO: 0.3 % — SIGNIFICANT CHANGE UP (ref 0–2)
BILIRUB SERPL-MCNC: 0.7 MG/DL — SIGNIFICANT CHANGE UP (ref 0.2–1.2)
BILIRUB UR-MCNC: NEGATIVE — SIGNIFICANT CHANGE UP
BLOOD UR QL VISUAL: NEGATIVE — SIGNIFICANT CHANGE UP
BUN SERPL-MCNC: 11 MG/DL — SIGNIFICANT CHANGE UP (ref 7–23)
CALCIUM SERPL-MCNC: 9 MG/DL — SIGNIFICANT CHANGE UP (ref 8.4–10.5)
CHLORIDE SERPL-SCNC: 102 MMOL/L — SIGNIFICANT CHANGE UP (ref 98–107)
CO2 SERPL-SCNC: 23 MMOL/L — SIGNIFICANT CHANGE UP (ref 22–31)
COLOR SPEC: YELLOW — SIGNIFICANT CHANGE UP
CREAT SERPL-MCNC: 0.99 MG/DL — SIGNIFICANT CHANGE UP (ref 0.5–1.3)
EOSINOPHIL # BLD AUTO: 0.03 K/UL — SIGNIFICANT CHANGE UP (ref 0–0.5)
EOSINOPHIL NFR BLD AUTO: 0.2 % — SIGNIFICANT CHANGE UP (ref 0–6)
GLUCOSE SERPL-MCNC: 103 MG/DL — HIGH (ref 70–99)
GLUCOSE UR-MCNC: NEGATIVE — SIGNIFICANT CHANGE UP
HCT VFR BLD CALC: 43.2 % — SIGNIFICANT CHANGE UP (ref 39–50)
HGB BLD-MCNC: 12.9 G/DL — LOW (ref 13–17)
HYALINE CASTS # UR AUTO: NEGATIVE — SIGNIFICANT CHANGE UP
IMM GRANULOCYTES NFR BLD AUTO: 0.5 % — SIGNIFICANT CHANGE UP (ref 0–1.5)
KETONES UR-MCNC: NEGATIVE — SIGNIFICANT CHANGE UP
LEUKOCYTE ESTERASE UR-ACNC: NEGATIVE — SIGNIFICANT CHANGE UP
LYMPHOCYTES # BLD AUTO: 0.62 K/UL — LOW (ref 1–3.3)
LYMPHOCYTES # BLD AUTO: 3.8 % — LOW (ref 13–44)
MCHC RBC-ENTMCNC: 21 PG — LOW (ref 27–34)
MCHC RBC-ENTMCNC: 29.9 % — LOW (ref 32–36)
MCV RBC AUTO: 70.2 FL — LOW (ref 80–100)
MONOCYTES # BLD AUTO: 0.8 K/UL — SIGNIFICANT CHANGE UP (ref 0–0.9)
MONOCYTES NFR BLD AUTO: 4.9 % — SIGNIFICANT CHANGE UP (ref 2–14)
NEUTROPHILS # BLD AUTO: 14.9 K/UL — HIGH (ref 1.8–7.4)
NEUTROPHILS NFR BLD AUTO: 90.3 % — HIGH (ref 43–77)
NITRITE UR-MCNC: NEGATIVE — SIGNIFICANT CHANGE UP
NRBC # FLD: 0 K/UL — SIGNIFICANT CHANGE UP (ref 0–0)
PH UR: 7.5 — SIGNIFICANT CHANGE UP (ref 5–8)
PLATELET # BLD AUTO: 192 K/UL — SIGNIFICANT CHANGE UP (ref 150–400)
PMV BLD: SIGNIFICANT CHANGE UP FL (ref 7–13)
POTASSIUM SERPL-MCNC: 4.4 MMOL/L — SIGNIFICANT CHANGE UP (ref 3.5–5.3)
POTASSIUM SERPL-SCNC: 4.4 MMOL/L — SIGNIFICANT CHANGE UP (ref 3.5–5.3)
PROT SERPL-MCNC: 7.5 G/DL — SIGNIFICANT CHANGE UP (ref 6–8.3)
PROT UR-MCNC: 30 — SIGNIFICANT CHANGE UP
RBC # BLD: 6.15 M/UL — HIGH (ref 4.2–5.8)
RBC # FLD: 18.6 % — HIGH (ref 10.3–14.5)
RBC CASTS # UR COMP ASSIST: SIGNIFICANT CHANGE UP (ref 0–?)
SODIUM SERPL-SCNC: 138 MMOL/L — SIGNIFICANT CHANGE UP (ref 135–145)
SP GR SPEC: 1.02 — SIGNIFICANT CHANGE UP (ref 1–1.04)
SQUAMOUS # UR AUTO: SIGNIFICANT CHANGE UP
UROBILINOGEN FLD QL: NORMAL — SIGNIFICANT CHANGE UP
WBC # BLD: 16.48 K/UL — HIGH (ref 3.8–10.5)
WBC # FLD AUTO: 16.48 K/UL — HIGH (ref 3.8–10.5)
WBC UR QL: SIGNIFICANT CHANGE UP (ref 0–?)

## 2020-01-16 PROCEDURE — 71046 X-RAY EXAM CHEST 2 VIEWS: CPT | Mod: 26

## 2020-01-16 PROCEDURE — 99284 EMERGENCY DEPT VISIT MOD MDM: CPT

## 2020-01-16 RX ORDER — IBUPROFEN 200 MG
1 TABLET ORAL
Qty: 28 | Refills: 0
Start: 2020-01-16 | End: 2020-01-22

## 2020-01-16 RX ORDER — ACETAMINOPHEN 500 MG
1 TABLET ORAL
Qty: 28 | Refills: 0
Start: 2020-01-16 | End: 2020-01-22

## 2020-01-16 RX ORDER — KETOROLAC TROMETHAMINE 30 MG/ML
15 SYRINGE (ML) INJECTION ONCE
Refills: 0 | Status: DISCONTINUED | OUTPATIENT
Start: 2020-01-16 | End: 2020-01-16

## 2020-01-16 RX ORDER — CEFUROXIME AXETIL 250 MG
1 TABLET ORAL
Qty: 12 | Refills: 0
Start: 2020-01-16 | End: 2020-01-21

## 2020-01-16 RX ORDER — AZITHROMYCIN 500 MG/1
500 TABLET, FILM COATED ORAL ONCE
Refills: 0 | Status: COMPLETED | OUTPATIENT
Start: 2020-01-16 | End: 2020-01-16

## 2020-01-16 RX ORDER — AZITHROMYCIN 500 MG/1
1 TABLET, FILM COATED ORAL
Qty: 4 | Refills: 0
Start: 2020-01-16 | End: 2020-01-19

## 2020-01-16 RX ORDER — SODIUM CHLORIDE 9 MG/ML
1000 INJECTION INTRAMUSCULAR; INTRAVENOUS; SUBCUTANEOUS ONCE
Refills: 0 | Status: COMPLETED | OUTPATIENT
Start: 2020-01-16 | End: 2020-01-16

## 2020-01-16 RX ORDER — CEFTRIAXONE 500 MG/1
1000 INJECTION, POWDER, FOR SOLUTION INTRAMUSCULAR; INTRAVENOUS ONCE
Refills: 0 | Status: COMPLETED | OUTPATIENT
Start: 2020-01-16 | End: 2020-01-16

## 2020-01-16 RX ORDER — ACETAMINOPHEN 500 MG
650 TABLET ORAL ONCE
Refills: 0 | Status: COMPLETED | OUTPATIENT
Start: 2020-01-16 | End: 2020-01-16

## 2020-01-16 RX ADMIN — AZITHROMYCIN 255 MILLIGRAM(S): 500 TABLET, FILM COATED ORAL at 16:32

## 2020-01-16 RX ADMIN — Medication 650 MILLIGRAM(S): at 16:32

## 2020-01-16 RX ADMIN — Medication 650 MILLIGRAM(S): at 17:32

## 2020-01-16 RX ADMIN — CEFTRIAXONE 100 MILLIGRAM(S): 500 INJECTION, POWDER, FOR SOLUTION INTRAMUSCULAR; INTRAVENOUS at 17:35

## 2020-01-16 RX ADMIN — SODIUM CHLORIDE 1000 MILLILITER(S): 9 INJECTION INTRAMUSCULAR; INTRAVENOUS; SUBCUTANEOUS at 14:34

## 2020-01-16 RX ADMIN — Medication 15 MILLIGRAM(S): at 14:34

## 2020-01-16 NOTE — ED ADULT NURSE NOTE - OBJECTIVE STATEMENT
Pt received to intake room 3. Pt comes to ED c/o body aches, cough, fever, and shortness of breath X3 days. Endorses "I think I have the flu." Hx of HTN. Respirations appear even & unlabored. Denies chest pain, dyspnea, N/V/D, weakness, dizziness, headache, palpitations at this time. Pt is A&Ox4, ambulates independently. 20 G IV placed to left hand.

## 2020-01-16 NOTE — ED PROVIDER NOTE - PROGRESS NOTE DETAILS
JOSE EDUARDO DUDLEY: Pt seen by attending, s/p quick look w/ rapid assessment signed out to me by Dr. Ignacio for labs, CXR to r/o pneumonia and reassess. PA LUIS ENRIUQE: CXR shows pneumonia in left mid lung. Pt is still febrile. Will order Ceftriaxone and Azithromycin for community acquired pneumonia. Tylenol for fever and reassess. Will continue to monitor and reassess. PA LUIS ENRIQUE: Patient reassessed, sitting comfortably in chair in NAD, denies any complaints. States feeling better, symptoms improved. Pt finished IV antibiotic. Observation unit offered to pt, pt declined, preferred to go home. The patient was given verbal and written discharge instructions. Specifically, instructions when to return to the ED and when to seek follow-up from their pcp was discussed. Any specialty follow-up was discussed, including how to make an appointment.  Instructions were discussed in simple, plain language and was understood by the patient. The patient understands that should their symptoms worsen or any new symptoms arise, they should return to the ED immediately for further evaluation. All pt's questions were answered. Patient verbalizes understanding.

## 2020-01-16 NOTE — ED PROVIDER NOTE - RAPID ASSESSMENT
40 y/o M w/ PMH HTN, pneumonia and CHF, presents to the ED c/o fever, cough, body aches and shortness of breath x3 days. Pt reports a couple of episodes of emesis. Denies any other acute complaints. NKDA.

## 2020-01-16 NOTE — ED PROVIDER NOTE - NSFOLLOWUPINSTRUCTIONS_ED_ALL_ED_FT
Rest, drink plenty of fluids.  Advance activity as tolerated. Take Tylenol 650mg (Two 325 mg pills) every 4-6 hours as needed for fever greater than 99.9F. Take Motrin 600 mg every 4-6 hours as needed for moderate pain -- take with food. Take Azithromycin 250mg once daily starting tomorrow. Take Cefuroxime 500mg two times a day for 6 days. Follow up with your primary care physician in 48-72 hours- bring copies of your results.  Return to the ER for worsening or persistent symptoms, and/or ANY NEW OR CONCERNING SYMPTOMS. If you have issues obtaining follow up, please call: [phone] to obtain a doctor or specialist who takes your insurance in your area.

## 2020-01-16 NOTE — ED ADULT NURSE REASSESSMENT NOTE - NS ED NURSE REASSESS COMMENT FT1
Pt. awaiting CT; POC explained to pt. and spouse at side. POC explained to pt.  awaiting xray results.

## 2020-01-16 NOTE — ED PROVIDER NOTE - OBJECTIVE STATEMENT
38 yo M with PMH of pneumonia, HTN, CHF, presents to ER c/o cough, fever and sob x3 days. Reports mild productive cough w/ some shortness of breath w/ wheezing. States having body aches, n/v. Admits his wife and kid are sick at home. Denies any dizziness, neck stiffness, chest pain, back pain, abdominal pain, diarrhea, recent travel, or any other complaints.

## 2020-01-16 NOTE — ED PROVIDER NOTE - CLINICAL SUMMARY MEDICAL DECISION MAKING FREE TEXT BOX
Sandee:SOB, cough, URI sx as well as vomiting. Will evaluate for metaboilic and infectious causes. Lungs clear not likely lt sided chf. No fever here.

## 2020-01-16 NOTE — ED PROVIDER NOTE - PATIENT PORTAL LINK FT
You can access the FollowMyHealth Patient Portal offered by [facility] by registering at the following website: [email or web address]. By joining [employer]’s FollowMyHealth portal, you will also be able to view your health information using other applications (apps) compatible with our system.

## 2020-01-31 ENCOUNTER — APPOINTMENT (OUTPATIENT)
Dept: CARDIOLOGY | Facility: CLINIC | Age: 40
End: 2020-01-31

## 2020-03-05 NOTE — ED ADULT TRIAGE NOTE - SPO2 (%)
March 5, 2020      Randall Dozier  [id number] [place] [address]        CERTIFICATE OF RETURN TO WORK      This is to certify that Randall Dozier has been under my care from 3/5/2020 and can return to regular work on 3/9/20. Please excuse patient from work today until 3/9/20.        RESTRICTIONS:  none        REMARKS:  none                      Signature:__________________________________________3/5/2020                                     Moody Benitez M.D.        
95

## 2020-06-10 ENCOUNTER — INPATIENT (INPATIENT)
Facility: HOSPITAL | Age: 40
LOS: 9 days | Discharge: HOME HEALTH | End: 2020-06-20
Attending: INTERNAL MEDICINE | Admitting: INTERNAL MEDICINE
Payer: COMMERCIAL

## 2020-06-10 VITALS
HEART RATE: 90 BPM | RESPIRATION RATE: 24 BRPM | SYSTOLIC BLOOD PRESSURE: 203 MMHG | TEMPERATURE: 98 F | OXYGEN SATURATION: 97 % | DIASTOLIC BLOOD PRESSURE: 115 MMHG

## 2020-06-10 DIAGNOSIS — I50.9 HEART FAILURE, UNSPECIFIED: ICD-10-CM

## 2020-06-10 LAB
ALBUMIN SERPL ELPH-MCNC: 3.6 G/DL — SIGNIFICANT CHANGE UP (ref 3.3–5)
ALP SERPL-CCNC: 71 U/L — SIGNIFICANT CHANGE UP (ref 40–120)
ALT FLD-CCNC: 18 U/L — SIGNIFICANT CHANGE UP (ref 4–41)
ANION GAP SERPL CALC-SCNC: 16 MMO/L — HIGH (ref 7–14)
APTT BLD: 28.5 SEC — SIGNIFICANT CHANGE UP (ref 27.5–36.3)
AST SERPL-CCNC: 30 U/L — SIGNIFICANT CHANGE UP (ref 4–40)
BASOPHILS # BLD AUTO: 0.06 K/UL — SIGNIFICANT CHANGE UP (ref 0–0.2)
BASOPHILS NFR BLD AUTO: 0.6 % — SIGNIFICANT CHANGE UP (ref 0–2)
BILIRUB SERPL-MCNC: 0.6 MG/DL — SIGNIFICANT CHANGE UP (ref 0.2–1.2)
BUN SERPL-MCNC: 17 MG/DL — SIGNIFICANT CHANGE UP (ref 7–23)
CALCIUM SERPL-MCNC: 8.5 MG/DL — SIGNIFICANT CHANGE UP (ref 8.4–10.5)
CHLORIDE SERPL-SCNC: 106 MMOL/L — SIGNIFICANT CHANGE UP (ref 98–107)
CO2 SERPL-SCNC: 20 MMOL/L — LOW (ref 22–31)
CREAT SERPL-MCNC: 1.37 MG/DL — HIGH (ref 0.5–1.3)
EOSINOPHIL # BLD AUTO: 0.38 K/UL — SIGNIFICANT CHANGE UP (ref 0–0.5)
EOSINOPHIL NFR BLD AUTO: 4 % — SIGNIFICANT CHANGE UP (ref 0–6)
GLUCOSE SERPL-MCNC: 109 MG/DL — HIGH (ref 70–99)
HCT VFR BLD CALC: 39.2 % — SIGNIFICANT CHANGE UP (ref 39–50)
HGB BLD-MCNC: 11.7 G/DL — LOW (ref 13–17)
IMM GRANULOCYTES NFR BLD AUTO: 0.2 % — SIGNIFICANT CHANGE UP (ref 0–1.5)
INR BLD: 1.13 — SIGNIFICANT CHANGE UP (ref 0.88–1.17)
LYMPHOCYTES # BLD AUTO: 1.79 K/UL — SIGNIFICANT CHANGE UP (ref 1–3.3)
LYMPHOCYTES # BLD AUTO: 18.8 % — SIGNIFICANT CHANGE UP (ref 13–44)
MAGNESIUM SERPL-MCNC: 1.9 MG/DL — SIGNIFICANT CHANGE UP (ref 1.6–2.6)
MCHC RBC-ENTMCNC: 20.3 PG — LOW (ref 27–34)
MCHC RBC-ENTMCNC: 29.8 % — LOW (ref 32–36)
MCV RBC AUTO: 67.9 FL — LOW (ref 80–100)
MONOCYTES # BLD AUTO: 0.73 K/UL — SIGNIFICANT CHANGE UP (ref 0–0.9)
MONOCYTES NFR BLD AUTO: 7.7 % — SIGNIFICANT CHANGE UP (ref 2–14)
NEUTROPHILS # BLD AUTO: 6.53 K/UL — SIGNIFICANT CHANGE UP (ref 1.8–7.4)
NEUTROPHILS NFR BLD AUTO: 68.7 % — SIGNIFICANT CHANGE UP (ref 43–77)
NRBC # FLD: 0 K/UL — SIGNIFICANT CHANGE UP (ref 0–0)
NT-PROBNP SERPL-SCNC: 1891 PG/ML — SIGNIFICANT CHANGE UP
PHOSPHATE SERPL-MCNC: 3.4 MG/DL — SIGNIFICANT CHANGE UP (ref 2.5–4.5)
PLATELET # BLD AUTO: 249 K/UL — SIGNIFICANT CHANGE UP (ref 150–400)
PMV BLD: SIGNIFICANT CHANGE UP FL (ref 7–13)
POTASSIUM SERPL-MCNC: 4 MMOL/L — SIGNIFICANT CHANGE UP (ref 3.5–5.3)
POTASSIUM SERPL-SCNC: 4 MMOL/L — SIGNIFICANT CHANGE UP (ref 3.5–5.3)
PROT SERPL-MCNC: 7.1 G/DL — SIGNIFICANT CHANGE UP (ref 6–8.3)
PROTHROM AB SERPL-ACNC: 12.9 SEC — SIGNIFICANT CHANGE UP (ref 9.8–13.1)
RBC # BLD: 5.77 M/UL — SIGNIFICANT CHANGE UP (ref 4.2–5.8)
RBC # FLD: 19.9 % — HIGH (ref 10.3–14.5)
SODIUM SERPL-SCNC: 142 MMOL/L — SIGNIFICANT CHANGE UP (ref 135–145)
TROPONIN T, HIGH SENSITIVITY: 51 NG/L — SIGNIFICANT CHANGE UP (ref ?–14)
WBC # BLD: 9.51 K/UL — SIGNIFICANT CHANGE UP (ref 3.8–10.5)
WBC # FLD AUTO: 9.51 K/UL — SIGNIFICANT CHANGE UP (ref 3.8–10.5)

## 2020-06-10 PROCEDURE — 93010 ELECTROCARDIOGRAM REPORT: CPT

## 2020-06-10 PROCEDURE — 99291 CRITICAL CARE FIRST HOUR: CPT

## 2020-06-10 PROCEDURE — 71045 X-RAY EXAM CHEST 1 VIEW: CPT | Mod: 26

## 2020-06-10 PROCEDURE — 99223 1ST HOSP IP/OBS HIGH 75: CPT

## 2020-06-10 RX ORDER — HYDRALAZINE HCL 50 MG
10 TABLET ORAL EVERY 6 HOURS
Refills: 0 | Status: DISCONTINUED | OUTPATIENT
Start: 2020-06-10 | End: 2020-06-13

## 2020-06-10 RX ORDER — HYDROMORPHONE HYDROCHLORIDE 2 MG/ML
0.25 INJECTION INTRAMUSCULAR; INTRAVENOUS; SUBCUTANEOUS ONCE
Refills: 0 | Status: DISCONTINUED | OUTPATIENT
Start: 2020-06-10 | End: 2020-06-10

## 2020-06-10 RX ORDER — FUROSEMIDE 40 MG
40 TABLET ORAL EVERY 12 HOURS
Refills: 0 | Status: DISCONTINUED | OUTPATIENT
Start: 2020-06-11 | End: 2020-06-13

## 2020-06-10 RX ORDER — FUROSEMIDE 40 MG
40 TABLET ORAL ONCE
Refills: 0 | Status: COMPLETED | OUTPATIENT
Start: 2020-06-10 | End: 2020-06-10

## 2020-06-10 RX ORDER — HYDRALAZINE HCL 50 MG
10 TABLET ORAL ONCE
Refills: 0 | Status: COMPLETED | OUTPATIENT
Start: 2020-06-10 | End: 2020-06-10

## 2020-06-10 RX ORDER — NITROGLYCERIN 6.5 MG
50 CAPSULE, EXTENDED RELEASE ORAL
Qty: 50 | Refills: 0 | Status: DISCONTINUED | OUTPATIENT
Start: 2020-06-10 | End: 2020-06-10

## 2020-06-10 RX ADMIN — Medication 10 MILLIGRAM(S): at 23:15

## 2020-06-10 RX ADMIN — Medication 50 MICROGRAM(S)/MIN: at 22:29

## 2020-06-10 RX ADMIN — Medication 0.15 MICROGRAM(S)/MIN: at 22:14

## 2020-06-10 RX ADMIN — Medication 15 MICROGRAM(S)/MIN: at 22:30

## 2020-06-10 RX ADMIN — Medication 40 MILLIGRAM(S): at 22:02

## 2020-06-10 NOTE — H&P ADULT - ASSESSMENT
41 y/o male with hx of systolic and diastolic heart failure, HTN who presents to the ED with progressive dyspnea and LE edema over the past 3-4 days. Found in the ED to be in hypertensive urgency/emergency with acute heart failure exacerbation to be admitted for further workup.

## 2020-06-10 NOTE — H&P ADULT - HISTORY OF PRESENT ILLNESS
Pt is a 41 y/o male with hx of systolic and diastolic heart failure, HTN who presents to the ED with progressive dyspnea and LE edema over the past 3-4 days. He has noted that he has started to have more intermittent shortness of breath over the last several weeks but were worse over the last few days. He had started to double his lasix dose to try to help with the swelling with no improvment. He also noted difficulty breathing laying down as well as a cough since yday. No fever, chills, chest pain, abdominal pain or dysuria/diarrhea. He notes that his usual BP ranges around 160s systolic and his meds havent changed. He hasn't seen a cardiologist in a while due to COVID but notes his diet is low salt. No significant weight gain noted. He was found in hypertensive urgency to 250s systolic, briefly on nitro gtt and given lasix, hydralazine

## 2020-06-10 NOTE — ED PROVIDER NOTE - CLINICAL SUMMARY MEDICAL DECISION MAKING FREE TEXT BOX
40 M PMHx CHF, HTN, presents for LEACH/SOB x2-3 days with leg swelling, wet cough. Has been compliant w/ lasix 40 qd and even attempted to double his dose without relief. +PND, orthopnea (needs to sleep upright). pt hypertensive and w/ evidence of fluid overload. will given lasix 40 iv and start nitro gtt. will need admission.

## 2020-06-10 NOTE — ED PROVIDER NOTE - NS ED ROS FT
Constitutional: no fevers or chills  HEENT: no visual changes, no sore throat, no rhinorrhea  CV: no cp or palpitations  Resp: +sob; +cough;   GI: no abd pain, no nausea, no vomiting, no diarrhea, no constipation  : no dysuria, no hematuria  MSK: no myalgais or arthralgias  skin: no rashes  neuro: no HA, no numbness; no weakness, no tingling  ROS statement: all other ROS negative except as per HPI

## 2020-06-10 NOTE — ED PROVIDER NOTE - PROGRESS NOTE DETAILS
Yazan Lacy MD. pt repeat bp 200/120 (from sbp 250s). pt states he feels morecomfortable. spoke w/ tele doc of day, accepted for admission for chf exacerbation. tele pa text paged.

## 2020-06-10 NOTE — ED ADULT NURSE NOTE - OBJECTIVE STATEMENT
pt a&o x3 c/o sob x 2 days. presents with b/l l/e swelling. pt slightly tachypneic. denies chest pain or ha. no changes in urinary status. md assessed. right ac 20g placed. will monitor

## 2020-06-10 NOTE — H&P ADULT - NSHPREVIEWOFSYSTEMS_GEN_ALL_CORE
REVIEW OF SYSTEMS:    CONSTITUTIONAL: No weakness, fevers or chills  EYES/ENT: No visual changes;  No dysphagia  NECK: No pain or stiffness  RESPIRATORY: +dyspnea, +cough  CARDIOVASCULAR: No chest pain or palpitations; +LE edema b/l  GASTROINTESTINAL: No abdominal or epigastric pain. No diarrhea or constipation. No melena or hematochezia.  GENITOURINARY: No dysuria, frequency or hematuria  NEUROLOGICAL: No numbness or weakness  SKIN: No itching, burning, rashes, or lesions

## 2020-06-10 NOTE — ED ADULT TRIAGE NOTE - CHIEF COMPLAINT QUOTE
Pt st" For past two days feeling short of breath... I have high pressure and my legs are very swollen more than usual" Denies chest pain Denies any sick contacts. Pt st'I have not been tested for covid...I have no fevers."  Pt diaphoretic, + cough x HTN

## 2020-06-10 NOTE — H&P ADULT - NSHPLABSRESULTS_GEN_ALL_CORE
06-10    142  |  106  |  17  ----------------------------<  109<H>  4.0   |  20<L>  |  1.37<H>    Ca    8.5      10 Slade 2020 21:57  Phos  3.4     06-10  Mg     1.9     06-10    TPro  7.1  /  Alb  3.6  /  TBili  0.6  /  DBili  x   /  AST  30  /  ALT  18  /  AlkPhos  71  06-10                            11.7   9.51  )-----------( 249      ( 10 Slade 2020 21:57 )             39.2             LIVER FUNCTIONS - ( 10 Slade 2020 21:57 )  Alb: 3.6 g/dL / Pro: 7.1 g/dL / ALK PHOS: 71 u/L / ALT: 18 u/L / AST: 30 u/L / GGT: x             PT/INR - ( 10 Slade 2020 21:57 )   PT: 12.9 SEC;   INR: 1.13          PTT - ( 10 Slade 2020 21:57 )  PTT:28.5 SEC    EKG: normal sinus rhythm with twi lateral leads similar to prior    CXR: INTERPRETATION:  pulmonary edema with small left pleural effusion

## 2020-06-10 NOTE — ED PROVIDER NOTE - OBJECTIVE STATEMENT
39 yo M PMHx CHF with global LV systolic and diastolic dysfunction, HTN, presents to the ED c/o worsening LEACH/SOB x2-3 days. Pt also noting that his legs have progressively become more swollen. Pt 41 yo M PMHx CHF with global LV systolic and diastolic dysfunction, HTN, presents to the ED c/o worsening LEACH/SOB x2-3 days. Pt also noting that his legs have progressively become more swollen. States he has been compliant w/ his lasix 40 qd and actually doubled his dose to try to help with the sob/swelling. Pt also noting paroxysmal nocturnal dyspnea and states he needs to sleep completely upright, as well as wet cough. Denies cp, fever, nausea, vomiting, diarrhea, abd pain.

## 2020-06-10 NOTE — H&P ADULT - PROBLEM SELECTOR PLAN 3
likely due to hypervolemia and elevated blood pressure  -continue diuresis and trend bmp and electrolytes

## 2020-06-10 NOTE — ED PROVIDER NOTE - ATTENDING CONTRIBUTION TO CARE
Dr. Wilcox:  I have personally performed a face to face bedside history and physical examination of this patient. I have discussed the history, examination, review of systems, assessment and plan of management with the resident. I have reviewed the electronic medical record and amended it to reflect my history, review of systems, physical exam, assessment and plan.    40M h/o CHF presents with 2-3 days of increased SOB and increased BLE edema.  Pt states he has been compliant with his meds and actually has doubled up on his Lasix at home as per his cardiologist recommendations.  Denies fever/chills, cp, n/v/d, sick contacts.    Exam:  - nad  - rrr  - bibasilar crackles  - abd soft ntnd  - +BLE pitting edema    A/P  - SOB likely CHF exacerbation  - cbc, cmp, trop, bnp, EKG, CXR  - lasix, nitro Dr. Wilcox:  I have personally performed a face to face bedside history and physical examination of this patient. I have discussed the history, examination, review of systems, assessment and plan of management with the resident. I have reviewed the electronic medical record and amended it to reflect my history, review of systems, physical exam, assessment and plan.    40M h/o CHF presents with 2-3 days of increased SOB and increased BLE edema.  Pt states he has been compliant with his meds and actually has doubled up on his Lasix at home as per his cardiologist recommendations.  Denies fever/chills, cp, n/v/d, sick contacts.    Exam:  - nad  - rrr  - bibasilar crackles  - abd soft ntnd  - +BLE pitting edema    A/P  - SOB likely CHF exacerbation; HTN urgency  - cbc, cmp, trop, bnp, EKG, CXR  - lasix, nitro gtt

## 2020-06-10 NOTE — ED PROVIDER NOTE - PHYSICAL EXAMINATION
PHYSICAL EXAM:  GENERAL: non-toxic appearing; in no respiratory distress  HEAD: Atraumatic, Normocephalic;  NECK: +JVD; FROM  EYES: PERRL, EOMs intact b/l w/out deficits  CHEST/LUNG: tachypneic; sating well on RA; crackles up to the mid lungs  HEART: RRR no murmur/gallops/rubs  ABDOMEN: +BS, soft, NT, ND  EXTREMITIES: 4+ pitting edema up to the thighs;, +2 radial pulses b/l  MUSCULOSKELETAL: FROM of all 4 extremities;  NERVOUS SYSTEM:  A&Ox3, No motor deficits or sensory deficits; CNII-XII intact; no focal neurologic deficits  SKIN:  No new rashes

## 2020-06-10 NOTE — H&P ADULT - PROBLEM SELECTOR PLAN 1
Likely 2/2 uncontrolled HTN, proBNP elevated, xray with pulm edema and pt with peripheral edema  -no chest pain and pt currently on room air  -pt s/p IV lasix 40mg in ED, will continue with q12h dosing. monitor I/O  -EKG appears similar to prior, will trend trop. Pt reporteldy had a cath done a year ago with no obstruction  -will monitor BP, restart coreg in AM to give pt time to diurese  -monitor on tele and monitor electrolytes. Repeat echo

## 2020-06-10 NOTE — H&P ADULT - NSHPPHYSICALEXAM_GEN_ALL_CORE
PHYSICAL EXAM:  GENERAL: laying in bed in minimal distress  HEAD:  Atraumatic, Normocephalic  EYES: EOMI, PERRLA, conjunctiva and sclera clear  NECK: Supple, No JVD  CHEST/LUNG: crackles in the bases b/l, no wheeze  HEART: Regular rate and rhythm; No murmurs, rubs, or gallops, (+)S1, S2  ABDOMEN: Soft, obese, Nontender, Normal Bowel sounds   EXTREMITIES:  b/l 3+ pitting edema to the thigh  PSYCH: normal mood and affect  NEUROLOGY: AAOx3, non-focal  SKIN: No rashes or lesions

## 2020-06-10 NOTE — H&P ADULT - PROBLEM SELECTOR PLAN 2
Initial MAP to 180, no neurologic complaints but presenting with heart failure and MUKESH. Pt given IV lasix with good urine output and s/p nitro gtt briefly with improvement in systolic bp to 197. Goal MAP around 135 (systolics around 180s)  -now off nitro gtt, will restart home coreg in AM (to have pt diurese more)  -hydralazine 10mg IV prn systolic bp >200  -continue IV lasix  -repeat echo as above Initial MAP to 180, no neurologic complaints but presenting with heart failure and MUKESH. Pt given IV lasix with good urine output and s/p nitro gtt briefly with improvement in systolic bp to 197. Goal MAP around 135 (systolics around 180s)  -now off nitro gtt, will restart home coreg in AM (to have pt diurese more)  -hydralazine 10mg IV prn systolic bp >200  -continue IV lasix  -can add calcium channel blocker like nifedipine if needed  -repeat echo as above

## 2020-06-11 DIAGNOSIS — I50.43 ACUTE ON CHRONIC COMBINED SYSTOLIC (CONGESTIVE) AND DIASTOLIC (CONGESTIVE) HEART FAILURE: ICD-10-CM

## 2020-06-11 DIAGNOSIS — N17.9 ACUTE KIDNEY FAILURE, UNSPECIFIED: ICD-10-CM

## 2020-06-11 DIAGNOSIS — E78.5 HYPERLIPIDEMIA, UNSPECIFIED: ICD-10-CM

## 2020-06-11 DIAGNOSIS — I16.1 HYPERTENSIVE EMERGENCY: ICD-10-CM

## 2020-06-11 DIAGNOSIS — Z29.9 ENCOUNTER FOR PROPHYLACTIC MEASURES, UNSPECIFIED: ICD-10-CM

## 2020-06-11 LAB
ALBUMIN SERPL ELPH-MCNC: 3.7 G/DL — SIGNIFICANT CHANGE UP (ref 3.3–5)
ALP SERPL-CCNC: 69 U/L — SIGNIFICANT CHANGE UP (ref 40–120)
ALT FLD-CCNC: 16 U/L — SIGNIFICANT CHANGE UP (ref 4–41)
ANION GAP SERPL CALC-SCNC: 14 MMO/L — SIGNIFICANT CHANGE UP (ref 7–14)
AST SERPL-CCNC: 24 U/L — SIGNIFICANT CHANGE UP (ref 4–40)
BASOPHILS # BLD AUTO: 0.05 K/UL — SIGNIFICANT CHANGE UP (ref 0–0.2)
BASOPHILS NFR BLD AUTO: 0.5 % — SIGNIFICANT CHANGE UP (ref 0–2)
BILIRUB DIRECT SERPL-MCNC: 0.2 MG/DL — SIGNIFICANT CHANGE UP (ref 0.1–0.2)
BILIRUB SERPL-MCNC: 0.8 MG/DL — SIGNIFICANT CHANGE UP (ref 0.2–1.2)
BUN SERPL-MCNC: 16 MG/DL — SIGNIFICANT CHANGE UP (ref 7–23)
CALCIUM SERPL-MCNC: 9 MG/DL — SIGNIFICANT CHANGE UP (ref 8.4–10.5)
CHLORIDE SERPL-SCNC: 106 MMOL/L — SIGNIFICANT CHANGE UP (ref 98–107)
CHOLEST SERPL-MCNC: 142 MG/DL — SIGNIFICANT CHANGE UP (ref 120–199)
CO2 SERPL-SCNC: 24 MMOL/L — SIGNIFICANT CHANGE UP (ref 22–31)
CREAT SERPL-MCNC: 1.25 MG/DL — SIGNIFICANT CHANGE UP (ref 0.5–1.3)
EOSINOPHIL # BLD AUTO: 0.35 K/UL — SIGNIFICANT CHANGE UP (ref 0–0.5)
EOSINOPHIL NFR BLD AUTO: 3.6 % — SIGNIFICANT CHANGE UP (ref 0–6)
GLUCOSE SERPL-MCNC: 108 MG/DL — HIGH (ref 70–99)
HBA1C BLD-MCNC: 6.2 % — HIGH (ref 4–5.6)
HCT VFR BLD CALC: 39.7 % — SIGNIFICANT CHANGE UP (ref 39–50)
HDLC SERPL-MCNC: 36 MG/DL — SIGNIFICANT CHANGE UP (ref 35–55)
HGB BLD-MCNC: 11.9 G/DL — LOW (ref 13–17)
IMM GRANULOCYTES NFR BLD AUTO: 0.3 % — SIGNIFICANT CHANGE UP (ref 0–1.5)
INR BLD: 1.16 — SIGNIFICANT CHANGE UP (ref 0.88–1.17)
LIPID PNL WITH DIRECT LDL SERPL: 107 MG/DL — SIGNIFICANT CHANGE UP
LYMPHOCYTES # BLD AUTO: 1.09 K/UL — SIGNIFICANT CHANGE UP (ref 1–3.3)
LYMPHOCYTES # BLD AUTO: 11.2 % — LOW (ref 13–44)
MAGNESIUM SERPL-MCNC: 2 MG/DL — SIGNIFICANT CHANGE UP (ref 1.6–2.6)
MCHC RBC-ENTMCNC: 20.8 PG — LOW (ref 27–34)
MCHC RBC-ENTMCNC: 30 % — LOW (ref 32–36)
MCV RBC AUTO: 69.5 FL — LOW (ref 80–100)
MONOCYTES # BLD AUTO: 0.75 K/UL — SIGNIFICANT CHANGE UP (ref 0–0.9)
MONOCYTES NFR BLD AUTO: 7.7 % — SIGNIFICANT CHANGE UP (ref 2–14)
NEUTROPHILS # BLD AUTO: 7.5 K/UL — HIGH (ref 1.8–7.4)
NEUTROPHILS NFR BLD AUTO: 76.7 % — SIGNIFICANT CHANGE UP (ref 43–77)
NRBC # FLD: 0 K/UL — SIGNIFICANT CHANGE UP (ref 0–0)
PHOSPHATE SERPL-MCNC: 2.8 MG/DL — SIGNIFICANT CHANGE UP (ref 2.5–4.5)
PLATELET # BLD AUTO: 261 K/UL — SIGNIFICANT CHANGE UP (ref 150–400)
PMV BLD: 10.9 FL — SIGNIFICANT CHANGE UP (ref 7–13)
POTASSIUM SERPL-MCNC: 4.1 MMOL/L — SIGNIFICANT CHANGE UP (ref 3.5–5.3)
POTASSIUM SERPL-SCNC: 4.1 MMOL/L — SIGNIFICANT CHANGE UP (ref 3.5–5.3)
PROT SERPL-MCNC: 6.7 G/DL — SIGNIFICANT CHANGE UP (ref 6–8.3)
PROTHROM AB SERPL-ACNC: 13.4 SEC — HIGH (ref 9.8–13.1)
RBC # BLD: 5.71 M/UL — SIGNIFICANT CHANGE UP (ref 4.2–5.8)
RBC # FLD: 19.9 % — HIGH (ref 10.3–14.5)
SARS-COV-2 RNA SPEC QL NAA+PROBE: SIGNIFICANT CHANGE UP
SODIUM SERPL-SCNC: 144 MMOL/L — SIGNIFICANT CHANGE UP (ref 135–145)
TRIGL SERPL-MCNC: 79 MG/DL — SIGNIFICANT CHANGE UP (ref 10–149)
TROPONIN T, HIGH SENSITIVITY: 49 NG/L — SIGNIFICANT CHANGE UP (ref ?–14)
TSH SERPL-MCNC: 0.96 UIU/ML — SIGNIFICANT CHANGE UP (ref 0.27–4.2)
WBC # BLD: 9.77 K/UL — SIGNIFICANT CHANGE UP (ref 3.8–10.5)
WBC # FLD AUTO: 9.77 K/UL — SIGNIFICANT CHANGE UP (ref 3.8–10.5)

## 2020-06-11 RX ORDER — METOPROLOL TARTRATE 50 MG
2.5 TABLET ORAL ONCE
Refills: 0 | Status: COMPLETED | OUTPATIENT
Start: 2020-06-11 | End: 2020-06-11

## 2020-06-11 RX ORDER — SIMVASTATIN 20 MG/1
20 TABLET, FILM COATED ORAL AT BEDTIME
Refills: 0 | Status: DISCONTINUED | OUTPATIENT
Start: 2020-06-11 | End: 2020-06-20

## 2020-06-11 RX ORDER — ASPIRIN/CALCIUM CARB/MAGNESIUM 324 MG
81 TABLET ORAL DAILY
Refills: 0 | Status: DISCONTINUED | OUTPATIENT
Start: 2020-06-11 | End: 2020-06-20

## 2020-06-11 RX ORDER — LISINOPRIL 2.5 MG/1
30 TABLET ORAL DAILY
Refills: 0 | Status: DISCONTINUED | OUTPATIENT
Start: 2020-06-11 | End: 2020-06-13

## 2020-06-11 RX ORDER — HEPARIN SODIUM 5000 [USP'U]/ML
5000 INJECTION INTRAVENOUS; SUBCUTANEOUS EVERY 12 HOURS
Refills: 0 | Status: DISCONTINUED | OUTPATIENT
Start: 2020-06-11 | End: 2020-06-20

## 2020-06-11 RX ORDER — CARVEDILOL PHOSPHATE 80 MG/1
12.5 CAPSULE, EXTENDED RELEASE ORAL EVERY 12 HOURS
Refills: 0 | Status: DISCONTINUED | OUTPATIENT
Start: 2020-06-11 | End: 2020-06-12

## 2020-06-11 RX ADMIN — CARVEDILOL PHOSPHATE 12.5 MILLIGRAM(S): 80 CAPSULE, EXTENDED RELEASE ORAL at 05:12

## 2020-06-11 RX ADMIN — Medication 81 MILLIGRAM(S): at 11:17

## 2020-06-11 RX ADMIN — HEPARIN SODIUM 5000 UNIT(S): 5000 INJECTION INTRAVENOUS; SUBCUTANEOUS at 17:50

## 2020-06-11 RX ADMIN — Medication 40 MILLIGRAM(S): at 05:15

## 2020-06-11 RX ADMIN — HEPARIN SODIUM 5000 UNIT(S): 5000 INJECTION INTRAVENOUS; SUBCUTANEOUS at 05:11

## 2020-06-11 RX ADMIN — Medication 40 MILLIGRAM(S): at 17:50

## 2020-06-11 RX ADMIN — Medication 2.5 MILLIGRAM(S): at 22:22

## 2020-06-11 RX ADMIN — CARVEDILOL PHOSPHATE 12.5 MILLIGRAM(S): 80 CAPSULE, EXTENDED RELEASE ORAL at 17:50

## 2020-06-11 RX ADMIN — LISINOPRIL 30 MILLIGRAM(S): 2.5 TABLET ORAL at 22:22

## 2020-06-11 RX ADMIN — SIMVASTATIN 20 MILLIGRAM(S): 20 TABLET, FILM COATED ORAL at 21:52

## 2020-06-11 NOTE — CONSULT NOTE ADULT - ATTENDING COMMENTS
EP ATTENDING    Agree with above. Has history of a systolic cardiomyopathy (LVEF 40% in Oct 2019) and a cath at [facility] was reportedly normal. Now admitted with CHF exacerbation, and EP called for NSVT.    Plan  -management of CHF as per cardiology  -get cath report from [facility]  -repeat echo  -if LVEF now < 35% I will recommend ICD therapy  -if LVEF > 36% then I will recommend simply beta blocker therapy  -final EP reccs pending repeat echo and prior cath report

## 2020-06-11 NOTE — CONSULT NOTE ADULT - SUBJECTIVE AND OBJECTIVE BOX
Requesting Physician : ER    Reason for Consultation: CHF    HISTORY OF PRESENT ILLNESS:  40 year old male with history of HFrEF (last TTE in Oct of 2019 demonstrated EF 40% and class III diastolic dysfunction), HTN who is being seen for management of heart failure.  The patient was admitted with 2 weeks of worsening dyspnea, orthopnea, and LE edema.  The patient denies chest pain or anginal symptoms.  He reports compliance with diet and lasix.  He was admitted and found to have volume overload and hypertensive urgency.  He was briefly placed on NTG gtt which was weaned off.  Cardiology consulted to further evaluate.  The patient had similar symptoms in Oct of 2019 at which time he was treated for elevated blood pressure and diuresed.  He reports an angiogram done at [facility] 1-2 years ago and was told he had no significant blockages.  No recent ischemic evaluation.           PAST MEDICAL & SURGICAL HISTORY:  HTN (hypertension)  Congestive heart failure  No significant past surgical history          MEDICATIONS:  MEDICATIONS  (STANDING):  aspirin  chewable 81 milliGRAM(s) Oral daily  carvedilol 12.5 milliGRAM(s) Oral every 12 hours  furosemide   Injectable 40 milliGRAM(s) IV Push every 12 hours  heparin   Injectable 5000 Unit(s) SubCutaneous every 12 hours  simvastatin 20 milliGRAM(s) Oral at bedtime      Allergies    No Known Allergies    Intolerances        FAMILY HISTORY:  No pertinent family history in first degree relatives    Non-contributary for premature coronary disease or sudden cardiac death    SOCIAL HISTORY:    [x ] Non-smoker  [ ] Smoker  [ ] Alcohol      REVIEW OF SYSTEMS:  [ ]chest pain  [ x ]shortness of breath  [  ]palpitations  [  ]syncope  [ ]near syncope [ ]upper extremity weakness   [ ] lower extremity weakness  [  ]diplopia  [  ]altered mental status   [  ]fevers  [ ]chills [ ]nausea  [ ]vomitting  [  ]dysphagia    [ ]abdominal pain  [ ]melena  [ ]BRBPR    [  ]epistaxis  [  ]rash    [x ]lower extremity edema        [ ] All others negative	  [ ] Unable to obtain    PHYSICAL EXAM:  T(C): 36.7 (06-11-20 @ 10:50), Max: 37.3 (06-11-20 @ 01:11)  HR: 70 (06-11-20 @ 10:50) (66 - 96)  BP: 146/98 (06-11-20 @ 10:50) (146/98 - 253/143)  RR: 25 (06-11-20 @ 10:50) (21 - 29)  SpO2: 98% (06-11-20 @ 10:50) (97% - 100%)  Wt(kg): --  I&O's Summary        HEENT:   Normal oral mucosa, PERRL, EOMI	  Lymphatic: No lymphadenopathy , + pitting edema in LE bilaterally   Cardiovascular: Normal S1 S2, No JVD, No murmurs , Peripheral pulses palpable 2+ bilaterally  Respiratory: Lungs clear to auscultation, normal effort 	  Gastrointestinal:  Soft, Non-tender, + BS	  Skin: No rashes, No ecchymoses, No cyanosis, warm to touch  Musculoskeletal: Normal range of motion, normal strength  Psychiatry:  Mood & affect appropriate      TELEMETRY: SR, 8 beats NSVT	    ECG: SR, inferolateral TWI  	  RADIOLOGY:  OTHER:     DIAGNOSTIC TESTING:  [ ] Echocardiogram: < from: TTE with Doppler (w/Cont) (10.09.19 @ 10:19) >  Conclusions:  1. Normal mitral valve. Mild mitral regurgitation.  2. Normal trileaflet aortic valve. No aortic valve  regurgitation seen.  3. Moderate concentric left ventricular hypertrophy.  4. Moderate global left ventricular systolic dysfunction  with regional variation.  The hypokinesis is more  pronounced in the septum.  5. Severe reversible diastolic dysfunction (Stage III).  6. Right ventricular enlargement with decreased right  ventricular systolic function.  7. Normal pericardium with no pericardial effusion.  *** No previous Echo exam.    < end of copied text >    [ ]  Catheterization:  [ ] Stress Test:    	  	  LABS:	 	    CARDIAC MARKERS:                              11.9   9.77  )-----------( 261      ( 11 Jun 2020 05:05 )             39.7     06-11    144  |  106  |  16  ----------------------------<  108<H>  4.1   |  24  |  1.25    Ca    9.0      11 Jun 2020 05:05  Phos  2.8     06-11  Mg     2.0     06-11    TPro  6.7  /  Alb  3.7  /  TBili  0.8  /  DBili  0.2  /  AST  24  /  ALT  16  /  AlkPhos  69  06-11    proBNP: Serum Pro-Brain Natriuretic Peptide: 1891 pg/mL (06-10 @ 21:57)    Lipid Profile:   HgA1c:   TSH: Thyroid Stimulating Hormone, Serum: 0.96 uIU/mL (06-11 @ 05:05)      ASSESSMENT/PLAN: :  40 year old male with history of HFrEF (last TTE in Oct of 2019 demonstrated EF 40% and class III diastolic dysfunction), HTN who is being seen for management of heart failure    -Pt. grossly volume overloaded  -Agree with IV lasix to keep O > I  -Pt. with last TTE with EF of 40% - repeat TTE to evaluate LV function  -Pt. with hypertensive urgency on admission - BP has improved; would slowly lower BP with oral agents  -On coreg for history of NICM; was on lisinopril at home; cr stable; can reintroduce ace inhibitor as BP tolerates  -Pt. with NSVT on tele - EP consult with Dr. Cintron called; keep K > 4, Mg > 2; Check TTE to eval LV function  -Further workup pending above    Juan Pablo Guan MD

## 2020-06-11 NOTE — PHARMACOTHERAPY INTERVENTION NOTE - COMMENTS
Medication history is complete. Medication list updated in Outpatient Medication Record (OMR). Please call spectra [phone] if you have any questions.  Medications were reviewed and verified with insurance fill as well as [facility] and [facility]. Patient fills at [facility] as of recent.

## 2020-06-11 NOTE — CHART NOTE - NSCHARTNOTEFT_GEN_A_CORE
Called by RN due to patient having 8 beats of NSVT on tele monitor. /95 Hr 66 resp 24 afebrile. Patient was asymptomatic. Did not complain of chest pain, palpitation or SOB. Labs K 4.1, Mag 2.0.  Will continue to monitor and add phosphorus level to am labs.

## 2020-06-11 NOTE — CONSULT NOTE ADULT - SUBJECTIVE AND OBJECTIVE BOX
HISTORY OF PRESENT ILLNESS: HPI:  Pt is a 39 y/o male with PMH NICM, EF 40% by last TTE 10/2019, reported normal cors by cath in 2019 at [place], admitted with acute on chronic systolic and diastolic HF exacerbation.  He c/o LE edema and orthopnea.  He denies CP or syncope.  He reports medication and dietary compliance.    PAST MEDICAL & SURGICAL HISTORY:  HTN (hypertension)  Congestive heart failure  No significant past surgical history      MEDICATIONS  (STANDING):  aspirin  chewable 81 milliGRAM(s) Oral daily  carvedilol 12.5 milliGRAM(s) Oral every 12 hours  furosemide   Injectable 40 milliGRAM(s) IV Push every 12 hours  heparin   Injectable 5000 Unit(s) SubCutaneous every 12 hours  simvastatin 20 milliGRAM(s) Oral at bedtime      Allergies  No Known Allergies      FAMILY HISTORY:  No pertinent family history in first degree relatives  Noncontributory for premature coronary disease or sudden cardiac death    SOCIAL HISTORY:    [ ] Non-smoker  [ ] Smoker  [ ] Alcohol    FLU VACCINE THIS YEAR STARTS IN AUGUST:  [ ] Yes    [ ] No    IF OVER 65 HAVE YOU EVER HAD A PNA VACCINE:  [ ] Yes    [ ] No       [ ] N/A      REVIEW OF SYSTEMS:  [ ]chest pain  [x  ]shortness of breath  [  ]palpitations  [  ]syncope  [ ]near syncope [ ]upper extremity weakness   [ ] lower extremity weakness  [  ]diplopia  [  ]altered mental status   [  ]fevers  [ ]chills [ ]nausea  [ ]vomitting  [  ]dysphagia    [ ]abdominal pain  [ ]melena  [ ]BRBPR    [  ]epistaxis  [  ]rash    [x ]lower extremity edema        [x ] All others negative	  [ ] Unable to obtain      LABS:	 	                        11.9   9.77  )-----------( 261      ( 11 Jun 2020 05:05 )             39.7     144  |  106  |  16  ----------------------------<  108<H>  4.1   |  24  |  1.25    Ca    9.0      11 Jun 2020 05:05  Phos  2.8     06-11  Mg     2.0     06-11    TPro  6.7  /  Alb  3.7  /  TBili  0.8  /  DBili  0.2  /  AST  24  /  ALT  16  /  AlkPhos  69  06-11    Coags:  PT/INR - ( 11 Jun 2020 05:05 )   PT: 13.4 SEC;   INR: 1.16       PTT - ( 10 Slade 2020 21:57 )  PTT:28.5 SEC    proBNP: Serum Pro-Brain Natriuretic Peptide: 1891 pg/mL (06-10 @ 21:57)    TSH: Thyroid Stimulating Hormone, Serum: 0.96 uIU/mL (06-11 @ 05:05)    PHYSICAL EXAM:  T(C): 36.7 (06-11-20 @ 15:00), Max: 37.3 (06-11-20 @ 01:11)  HR: 76 (06-11-20 @ 15:00) (66 - 96)  BP: 170/103 (06-11-20 @ 15:00) (146/98 - 253/143)  RR: 18 (06-11-20 @ 15:00) (18 - 29)  SpO2: 100% (06-11-20 @ 15:00) (97% - 100%)    Gen: Appears well in NAD  HEENT:  (-)icterus (-)pallor  CV: N S1 S2 1/6 SALLY (+)2 Pulses B/l  Resp:  Clear to ausculatation B/L, normal effort  GI: (+) BS Soft, NT, ND  Lymph:  (-)Edema, (-)obvious lymphadenopathy  Skin: Warm to touch, Normal turgor  Psych: Appropriate mood and affect    EKG: SR, QRS 100ms, QTc 504ms, inferolateral ST changes    Echo 10/9/19- EF 40%, severe DD, decreased RV function    ASSESSMENT/PLAN: 	40y Male HTN, NICM, EF 40% 10/2019, admitted with decompensated CHF, tele with NSVT    --IV diuresis  --repeat TTE when euvolemic  --If EF > 35%, no further EP intervention planned  --cont tele HISTORY OF PRESENT ILLNESS: HPI:  Pt is a 41 y/o male with PMH NICM, EF 40% by last TTE 10/2019, reported normal cors by cath in 2019 at [place], admitted with acute on chronic systolic and diastolic HF exacerbation.  He c/o LE edema and orthopnea.  He denies CP or syncope.  He reports medication and dietary compliance.    PAST MEDICAL & SURGICAL HISTORY:  HTN (hypertension)  Congestive heart failure  No significant past surgical history      MEDICATIONS  (STANDING):  aspirin  chewable 81 milliGRAM(s) Oral daily  carvedilol 12.5 milliGRAM(s) Oral every 12 hours  furosemide   Injectable 40 milliGRAM(s) IV Push every 12 hours  heparin   Injectable 5000 Unit(s) SubCutaneous every 12 hours  simvastatin 20 milliGRAM(s) Oral at bedtime      Allergies  No Known Allergies      FAMILY HISTORY:  No pertinent family history in first degree relatives  Noncontributory for premature coronary disease or sudden cardiac death    SOCIAL HISTORY:    [ ] Non-smoker  [ ] Smoker  [ ] Alcohol    FLU VACCINE THIS YEAR STARTS IN AUGUST:  [ ] Yes    [ ] No    IF OVER 65 HAVE YOU EVER HAD A PNA VACCINE:  [ ] Yes    [ ] No       [ ] N/A      REVIEW OF SYSTEMS:  [ ]chest pain  [x  ]shortness of breath  [  ]palpitations  [  ]syncope  [ ]near syncope [ ]upper extremity weakness   [ ] lower extremity weakness  [  ]diplopia  [  ]altered mental status   [  ]fevers  [ ]chills [ ]nausea  [ ]vomitting  [  ]dysphagia    [ ]abdominal pain  [ ]melena  [ ]BRBPR    [  ]epistaxis  [  ]rash    [x ]lower extremity edema        [x ] All others negative	  [ ] Unable to obtain      LABS:	 	                        11.9   9.77  )-----------( 261      ( 11 Jun 2020 05:05 )             39.7     144  |  106  |  16  ----------------------------<  108<H>  4.1   |  24  |  1.25    Ca    9.0      11 Jun 2020 05:05  Phos  2.8     06-11  Mg     2.0     06-11    TPro  6.7  /  Alb  3.7  /  TBili  0.8  /  DBili  0.2  /  AST  24  /  ALT  16  /  AlkPhos  69  06-11    Coags:  PT/INR - ( 11 Jun 2020 05:05 )   PT: 13.4 SEC;   INR: 1.16       PTT - ( 10 Slade 2020 21:57 )  PTT:28.5 SEC    proBNP: Serum Pro-Brain Natriuretic Peptide: 1891 pg/mL (06-10 @ 21:57)    TSH: Thyroid Stimulating Hormone, Serum: 0.96 uIU/mL (06-11 @ 05:05)    PHYSICAL EXAM:  T(C): 36.7 (06-11-20 @ 15:00), Max: 37.3 (06-11-20 @ 01:11)  HR: 76 (06-11-20 @ 15:00) (66 - 96)  BP: 170/103 (06-11-20 @ 15:00) (146/98 - 253/143)  RR: 18 (06-11-20 @ 15:00) (18 - 29)  SpO2: 100% (06-11-20 @ 15:00) (97% - 100%)    Gen: Appears well in NAD  HEENT:  (-)icterus (-)pallor  CV: N S1 S2 1/6 SALLY (+)2 Pulses B/l  Resp:  Clear to ausculatation B/L, normal effort  GI: (+) BS Soft, NT, ND  Lymph:  (-)Edema, (-)obvious lymphadenopathy  Skin: Warm to touch, Normal turgor  Psych: Appropriate mood and affect    EKG: SR, QRS 100ms, QTc 504ms, inferolateral ST changes    Echo 10/9/19- EF 40%, severe DD, decreased RV function    ASSESSMENT/PLAN: 	40y Male HTN, NICM, EF 40% 10/2019, admitted with decompensated CHF, with NSVT    --IV diuresis  --repeat TTE when euvolemic  --If EF > 35%, no further EP intervention planned  --cont tele

## 2020-06-12 LAB
ANION GAP SERPL CALC-SCNC: 13 MMO/L — SIGNIFICANT CHANGE UP (ref 7–14)
BUN SERPL-MCNC: 17 MG/DL — SIGNIFICANT CHANGE UP (ref 7–23)
CALCIUM SERPL-MCNC: 8.5 MG/DL — SIGNIFICANT CHANGE UP (ref 8.4–10.5)
CHLORIDE SERPL-SCNC: 104 MMOL/L — SIGNIFICANT CHANGE UP (ref 98–107)
CO2 SERPL-SCNC: 23 MMOL/L — SIGNIFICANT CHANGE UP (ref 22–31)
CREAT SERPL-MCNC: 1.2 MG/DL — SIGNIFICANT CHANGE UP (ref 0.5–1.3)
GLUCOSE SERPL-MCNC: 101 MG/DL — HIGH (ref 70–99)
HCT VFR BLD CALC: 38.7 % — LOW (ref 39–50)
HGB BLD-MCNC: 11.3 G/DL — LOW (ref 13–17)
MAGNESIUM SERPL-MCNC: 2 MG/DL — SIGNIFICANT CHANGE UP (ref 1.6–2.6)
MCHC RBC-ENTMCNC: 20.2 PG — LOW (ref 27–34)
MCHC RBC-ENTMCNC: 29.2 % — LOW (ref 32–36)
MCV RBC AUTO: 69.2 FL — LOW (ref 80–100)
NRBC # FLD: 0 K/UL — SIGNIFICANT CHANGE UP (ref 0–0)
PHOSPHATE SERPL-MCNC: 3.4 MG/DL — SIGNIFICANT CHANGE UP (ref 2.5–4.5)
PLATELET # BLD AUTO: 228 K/UL — SIGNIFICANT CHANGE UP (ref 150–400)
PMV BLD: SIGNIFICANT CHANGE UP FL (ref 7–13)
POTASSIUM SERPL-MCNC: 3.6 MMOL/L — SIGNIFICANT CHANGE UP (ref 3.5–5.3)
POTASSIUM SERPL-SCNC: 3.6 MMOL/L — SIGNIFICANT CHANGE UP (ref 3.5–5.3)
RBC # BLD: 5.59 M/UL — SIGNIFICANT CHANGE UP (ref 4.2–5.8)
RBC # FLD: 19.7 % — HIGH (ref 10.3–14.5)
SODIUM SERPL-SCNC: 140 MMOL/L — SIGNIFICANT CHANGE UP (ref 135–145)
WBC # BLD: 7.98 K/UL — SIGNIFICANT CHANGE UP (ref 3.8–10.5)
WBC # FLD AUTO: 7.98 K/UL — SIGNIFICANT CHANGE UP (ref 3.8–10.5)

## 2020-06-12 PROCEDURE — 93306 TTE W/DOPPLER COMPLETE: CPT | Mod: 26

## 2020-06-12 RX ORDER — CARVEDILOL PHOSPHATE 80 MG/1
12.5 CAPSULE, EXTENDED RELEASE ORAL EVERY 12 HOURS
Refills: 0 | Status: DISCONTINUED | OUTPATIENT
Start: 2020-06-12 | End: 2020-06-13

## 2020-06-12 RX ORDER — HYDRALAZINE HCL 50 MG
5 TABLET ORAL ONCE
Refills: 0 | Status: DISCONTINUED | OUTPATIENT
Start: 2020-06-12 | End: 2020-06-12

## 2020-06-12 RX ORDER — POTASSIUM CHLORIDE 20 MEQ
40 PACKET (EA) ORAL ONCE
Refills: 0 | Status: COMPLETED | OUTPATIENT
Start: 2020-06-12 | End: 2020-06-12

## 2020-06-12 RX ORDER — CARVEDILOL PHOSPHATE 80 MG/1
18.75 CAPSULE, EXTENDED RELEASE ORAL EVERY 12 HOURS
Refills: 0 | Status: DISCONTINUED | OUTPATIENT
Start: 2020-06-12 | End: 2020-06-12

## 2020-06-12 RX ADMIN — Medication 40 MILLIGRAM(S): at 05:28

## 2020-06-12 RX ADMIN — CARVEDILOL PHOSPHATE 12.5 MILLIGRAM(S): 80 CAPSULE, EXTENDED RELEASE ORAL at 05:28

## 2020-06-12 RX ADMIN — Medication 81 MILLIGRAM(S): at 12:21

## 2020-06-12 RX ADMIN — HEPARIN SODIUM 5000 UNIT(S): 5000 INJECTION INTRAVENOUS; SUBCUTANEOUS at 05:28

## 2020-06-12 RX ADMIN — HEPARIN SODIUM 5000 UNIT(S): 5000 INJECTION INTRAVENOUS; SUBCUTANEOUS at 17:59

## 2020-06-12 RX ADMIN — CARVEDILOL PHOSPHATE 12.5 MILLIGRAM(S): 80 CAPSULE, EXTENDED RELEASE ORAL at 17:58

## 2020-06-12 RX ADMIN — Medication 40 MILLIGRAM(S): at 18:09

## 2020-06-12 RX ADMIN — SIMVASTATIN 20 MILLIGRAM(S): 20 TABLET, FILM COATED ORAL at 21:23

## 2020-06-12 RX ADMIN — Medication 40 MILLIEQUIVALENT(S): at 10:27

## 2020-06-12 NOTE — PROGRESS NOTE ADULT - SUBJECTIVE AND OBJECTIVE BOX
Subjective: No chest pain; dyspnea improved; ROS otherwise negative.   	  MEDICATIONS:  MEDICATIONS  (STANDING):  aspirin  chewable 81 milliGRAM(s) Oral daily  carvedilol 12.5 milliGRAM(s) Oral every 12 hours  furosemide   Injectable 40 milliGRAM(s) IV Push every 12 hours  heparin   Injectable 5000 Unit(s) SubCutaneous every 12 hours  lisinopril 30 milliGRAM(s) Oral daily  simvastatin 20 milliGRAM(s) Oral at bedtime      LABS:	 	    CARDIAC MARKERS:                                11.3   7.98  )-----------( 228      ( 12 Jun 2020 06:55 )             38.7     06-12    140  |  104  |  17  ----------------------------<  101<H>  3.6   |  23  |  1.20    Ca    8.5      12 Jun 2020 06:55  Phos  3.4     06-12  Mg     2.0     06-12    TPro  6.7  /  Alb  3.7  /  TBili  0.8  /  DBili  0.2  /  AST  24  /  ALT  16  /  AlkPhos  69  06-11    proBNP:   Lipid Profile:   HgA1c:   TSH:       PHYSICAL EXAM:  T(C): 36.9 (06-12-20 @ 13:37), Max: 36.9 (06-11-20 @ 21:50)  HR: 67 (06-12-20 @ 13:37) (67 - 74)  BP: 165/120 (06-12-20 @ 13:37) (144/91 - 183/120)  RR: 22 (06-12-20 @ 13:37) (18 - 24)  SpO2: 96% (06-12-20 @ 13:37) (96% - 100%)  Wt(kg): --  I&O's Summary    11 Jun 2020 07:01  -  12 Jun 2020 07:00  --------------------------------------------------------  IN: 500 mL / OUT: 1200 mL / NET: -700 mL    12 Jun 2020 07:01  -  12 Jun 2020 17:44  --------------------------------------------------------  IN: 650 mL / OUT: 1750 mL / NET: -1100 mL          Appearance: Normal	  HEENT:   Normal oral mucosa, PERRL, EOMI	  Lymphatic: No lymphadenopathy , + edema in LE bilaterally   Cardiovascular: Normal S1 S2, No JVD, No murmurs , Peripheral pulses palpable 2+ bilaterally  Respiratory: Lungs clear to auscultation, normal effort 	  Gastrointestinal:  Soft, Non-tender, + BS	  Skin: No rashes, No ecchymoses, No cyanosis, warm to touch  Musculoskeletal: Normal range of motion, normal strength  Psychiatry:  Mood & affect appropriate    TELEMETRY: SR	    ECG:  	  RADIOLOGY:   DIAGNOSTIC TESTING:  [ ] Echocardiogram: < from: TTE with Doppler (w/Cont) (06.12.20 @ 08:33) >  1. Normal mitral valve. Minimal mitral regurgitation.  2. Severe concentric left ventricular hypertrophy.  3. Endocardium not well visualized; grossly moderate global  left ventricular systolic dysfunction.  Endocardial  visualization enhanced with intravenous injection of echo  contrast (Definity).  4. Severe diastolic dysfunction.  5. Unable to accurately evaluate right ventricular size or  systolic function.  *** No previous Echo exam.    < end of copied text >    [ ]  Catheterization:  [ ] Stress Test:    OTHER: 	      ASSESSMENT/PLAN:  40 year old male with history of HFrEF (last TTE in Oct of 2019 demonstrated EF 40% and class III diastolic dysfunction), HTN who is being seen for management of heart failure.    -pt. still volume overloaded but volume status has improved  -continue with IV diuresis to keep O >I  -TTE noted  -obtain prior ischemic eval  -EP follow up      Juan Pablo Guan MD

## 2020-06-12 NOTE — PROGRESS NOTE ADULT - SUBJECTIVE AND OBJECTIVE BOX
Patient denies chest pain or shortness of breath.   Review of systems otherwise (-)    	  MEDICATIONS:  MEDICATIONS  (STANDING):  aspirin  chewable 81 milliGRAM(s) Oral daily  carvedilol 12.5 milliGRAM(s) Oral every 12 hours  furosemide   Injectable 40 milliGRAM(s) IV Push every 12 hours  heparin   Injectable 5000 Unit(s) SubCutaneous every 12 hours  lisinopril 30 milliGRAM(s) Oral daily  simvastatin 20 milliGRAM(s) Oral at bedtime      LABS:	 	    CARDIAC MARKERS:                         11.3   7.98  )-----------( 228      ( 12 Jun 2020 06:55 )             38.7     Hemoglobin: 11.3 g/dL (06-12 @ 06:55)  Hemoglobin: 11.9 g/dL (06-11 @ 05:05)  Hemoglobin: 11.7 g/dL (06-10 @ 21:57)      06-12    140  |  104  |  17  ----------------------------<  101<H>  3.6   |  23  |  1.20    Ca    8.5      12 Jun 2020 06:55  Phos  3.4     06-12  Mg     2.0     06-12    TPro  6.7  /  Alb  3.7  /  TBili  0.8  /  DBili  0.2  /  AST  24  /  ALT  16  /  AlkPhos  69  06-11    Creatinine Trend: 1.20<--, 1.25<--, 1.37<--    COAGS:       proBNP:   Lipid Profile:   HgA1c:   TSH:       PHYSICAL EXAM:  T(C): 36.8 (06-12-20 @ 10:31), Max: 37 (06-11-20 @ 17:43)  HR: 72 (06-12-20 @ 10:31) (68 - 76)  BP: 161/94 (06-12-20 @ 10:31) (144/91 - 191/108)  RR: 24 (06-12-20 @ 10:31) (18 - 24)  SpO2: 100% (06-12-20 @ 10:31) (100% - 100%)  Wt(kg): --  I&O's Summary    11 Jun 2020 07:01  -  12 Jun 2020 07:00  --------------------------------------------------------  IN: 500 mL / OUT: 1200 mL / NET: -700 mL    12 Jun 2020 07:01  -  12 Jun 2020 11:27  --------------------------------------------------------  IN: 250 mL / OUT: 1050 mL / NET: -800 mL          Gen: Appears well in NAD  HEENT:  (-)icterus (-)pallor  CV: N S1 S2 1/6 SALLY (+)2 Pulses B/l  Resp:  Clear to ausculatation B/L, normal effort  GI: (+) BS Soft, NT, ND  Lymph:  (-)Edema, (-)obvious lymphadenopathy  Skin: Warm to touch, Normal turgor  Psych: Appropriate mood and affect      TELEMETRY: 	  SR / SB, with brief SB to 40's overnight, PATs     EKG: SR, QRS 100ms, QTc 504ms, inferolateral ST changes    < from: TTE with Doppler (w/Cont) (10.09.19 @ 10:19) >  Conclusions:  1. Normal mitral valve. Mild mitral regurgitation.  2. Normal trileaflet aortic valve. No aortic valve  regurgitation seen.  3. Moderate concentric left ventricular hypertrophy.  4. Moderate global left ventricular systolic dysfunction  with regional variation.  The hypokinesis is more  pronounced in the septum.  5. Severe reversible diastolic dysfunction (Stage III).  6. Right ventricular enlargement with decreased right  ventricular systolic function.  7. Normal pericardium with no pericardial effusion.  *** No previous Echo exam.  ------------------------------------------------------------------------  Confirmed on  10/9/2019 - 14:53:15 by Tanner Alonso M.D.    < end of copied text >      ASSESSMENT/PLAN: 	40y Male HTN, NICM, EF 40% 10/2019, admitted with decompensated CHF, with NSVT.       --continuer IV diuresis, keep net negative  --echo results pending   --cont tele, SR thus far   --Echo 10/9/19- EF 40%, severe DD, decreased RV function as noted   --if LVEF now < 35%, will recommend ICD therapy  --if LVEF > 36%, will recommend simply beta blocker therapy  --final EP reccs pending repeat echo and prior cath report from [facility] Patient denies chest pain or shortness of breath.   Review of systems otherwise (-)    	  MEDICATIONS:  MEDICATIONS  (STANDING):  aspirin  chewable 81 milliGRAM(s) Oral daily  carvedilol 12.5 milliGRAM(s) Oral every 12 hours  furosemide   Injectable 40 milliGRAM(s) IV Push every 12 hours  heparin   Injectable 5000 Unit(s) SubCutaneous every 12 hours  lisinopril 30 milliGRAM(s) Oral daily  simvastatin 20 milliGRAM(s) Oral at bedtime      LABS:	 	    CARDIAC MARKERS:                         11.3   7.98  )-----------( 228      ( 12 Jun 2020 06:55 )             38.7     Hemoglobin: 11.3 g/dL (06-12 @ 06:55)  Hemoglobin: 11.9 g/dL (06-11 @ 05:05)  Hemoglobin: 11.7 g/dL (06-10 @ 21:57)      06-12    140  |  104  |  17  ----------------------------<  101<H>  3.6   |  23  |  1.20    Ca    8.5      12 Jun 2020 06:55  Phos  3.4     06-12  Mg     2.0     06-12    TPro  6.7  /  Alb  3.7  /  TBili  0.8  /  DBili  0.2  /  AST  24  /  ALT  16  /  AlkPhos  69  06-11    Creatinine Trend: 1.20<--, 1.25<--, 1.37<--    COAGS:       proBNP:   Lipid Profile:   HgA1c:   TSH:       PHYSICAL EXAM:  T(C): 36.8 (06-12-20 @ 10:31), Max: 37 (06-11-20 @ 17:43)  HR: 72 (06-12-20 @ 10:31) (68 - 76)  BP: 161/94 (06-12-20 @ 10:31) (144/91 - 191/108)  RR: 24 (06-12-20 @ 10:31) (18 - 24)  SpO2: 100% (06-12-20 @ 10:31) (100% - 100%)  Wt(kg): --  I&O's Summary    11 Jun 2020 07:01  -  12 Jun 2020 07:00  --------------------------------------------------------  IN: 500 mL / OUT: 1200 mL / NET: -700 mL    12 Jun 2020 07:01  -  12 Jun 2020 11:27  --------------------------------------------------------  IN: 250 mL / OUT: 1050 mL / NET: -800 mL          Gen: Appears well in NAD  HEENT:  (-)icterus (-)pallor  CV: N S1 S2 1/6 SALLY (+)2 Pulses B/l  Resp:  Clear to ausculatation B/L, normal effort  GI: (+) BS Soft, NT, ND  Lymph:  (-)Edema, (-)obvious lymphadenopathy  Skin: Warm to touch, Normal turgor  Psych: Appropriate mood and affect      TELEMETRY: 	  SR / SB, with brief SB to 40's overnight, PATs     EKG: SR, QRS 100ms, QTc 504ms, inferolateral ST changes    < from: TTE with Doppler (w/Cont) (10.09.19 @ 10:19) >  Conclusions:  1. Normal mitral valve. Mild mitral regurgitation.  2. Normal trileaflet aortic valve. No aortic valve  regurgitation seen.  3. Moderate concentric left ventricular hypertrophy.  4. Moderate global left ventricular systolic dysfunction  with regional variation.  The hypokinesis is more  pronounced in the septum.  5. Severe reversible diastolic dysfunction (Stage III).  6. Right ventricular enlargement with decreased right  ventricular systolic function.  7. Normal pericardium with no pericardial effusion.  *** No previous Echo exam.  ------------------------------------------------------------------------  Confirmed on  10/9/2019 - 14:53:15 by Tanner Alonso M.D.    < end of copied text >      ASSESSMENT/PLAN: 	40y Male HTN, NICM, EF 40% 10/2019, admitted with decompensated CHF, with NSVT.       --continuer IV diuresis, keep net negative  --echo results pending   --cont tele, SR thus far   --if LVEF now < 35%, will recommend ICD therapy  --if LVEF > 36%, will recommend simply beta blocker therapy  --final EP reccs pending repeat echo and prior cath report from [facility]

## 2020-06-13 LAB
ANION GAP SERPL CALC-SCNC: 13 MMO/L — SIGNIFICANT CHANGE UP (ref 7–14)
BASOPHILS # BLD AUTO: 0.05 K/UL — SIGNIFICANT CHANGE UP (ref 0–0.2)
BASOPHILS NFR BLD AUTO: 0.6 % — SIGNIFICANT CHANGE UP (ref 0–2)
BUN SERPL-MCNC: 19 MG/DL — SIGNIFICANT CHANGE UP (ref 7–23)
CALCIUM SERPL-MCNC: 8.5 MG/DL — SIGNIFICANT CHANGE UP (ref 8.4–10.5)
CHLORIDE SERPL-SCNC: 104 MMOL/L — SIGNIFICANT CHANGE UP (ref 98–107)
CO2 SERPL-SCNC: 22 MMOL/L — SIGNIFICANT CHANGE UP (ref 22–31)
CREAT SERPL-MCNC: 1.15 MG/DL — SIGNIFICANT CHANGE UP (ref 0.5–1.3)
EOSINOPHIL # BLD AUTO: 0.34 K/UL — SIGNIFICANT CHANGE UP (ref 0–0.5)
EOSINOPHIL NFR BLD AUTO: 4.3 % — SIGNIFICANT CHANGE UP (ref 0–6)
GLUCOSE SERPL-MCNC: 100 MG/DL — HIGH (ref 70–99)
HCT VFR BLD CALC: 38 % — LOW (ref 39–50)
HGB BLD-MCNC: 11.4 G/DL — LOW (ref 13–17)
IMM GRANULOCYTES NFR BLD AUTO: 0.3 % — SIGNIFICANT CHANGE UP (ref 0–1.5)
LYMPHOCYTES # BLD AUTO: 1.96 K/UL — SIGNIFICANT CHANGE UP (ref 1–3.3)
LYMPHOCYTES # BLD AUTO: 24.9 % — SIGNIFICANT CHANGE UP (ref 13–44)
MAGNESIUM SERPL-MCNC: 2 MG/DL — SIGNIFICANT CHANGE UP (ref 1.6–2.6)
MCHC RBC-ENTMCNC: 20.7 PG — LOW (ref 27–34)
MCHC RBC-ENTMCNC: 30 % — LOW (ref 32–36)
MCV RBC AUTO: 69.1 FL — LOW (ref 80–100)
MONOCYTES # BLD AUTO: 0.68 K/UL — SIGNIFICANT CHANGE UP (ref 0–0.9)
MONOCYTES NFR BLD AUTO: 8.7 % — SIGNIFICANT CHANGE UP (ref 2–14)
NEUTROPHILS # BLD AUTO: 4.81 K/UL — SIGNIFICANT CHANGE UP (ref 1.8–7.4)
NEUTROPHILS NFR BLD AUTO: 61.2 % — SIGNIFICANT CHANGE UP (ref 43–77)
NRBC # FLD: 0 K/UL — SIGNIFICANT CHANGE UP (ref 0–0)
PHOSPHATE SERPL-MCNC: 3.3 MG/DL — SIGNIFICANT CHANGE UP (ref 2.5–4.5)
PLATELET # BLD AUTO: 236 K/UL — SIGNIFICANT CHANGE UP (ref 150–400)
PMV BLD: SIGNIFICANT CHANGE UP FL (ref 7–13)
POTASSIUM SERPL-MCNC: 3.6 MMOL/L — SIGNIFICANT CHANGE UP (ref 3.5–5.3)
POTASSIUM SERPL-SCNC: 3.6 MMOL/L — SIGNIFICANT CHANGE UP (ref 3.5–5.3)
RBC # BLD: 5.5 M/UL — SIGNIFICANT CHANGE UP (ref 4.2–5.8)
RBC # FLD: 19.3 % — HIGH (ref 10.3–14.5)
SODIUM SERPL-SCNC: 139 MMOL/L — SIGNIFICANT CHANGE UP (ref 135–145)
WBC # BLD: 7.86 K/UL — SIGNIFICANT CHANGE UP (ref 3.8–10.5)
WBC # FLD AUTO: 7.86 K/UL — SIGNIFICANT CHANGE UP (ref 3.8–10.5)

## 2020-06-13 RX ORDER — ISOSORBIDE DINITRATE 5 MG/1
5 TABLET ORAL THREE TIMES A DAY
Refills: 0 | Status: DISCONTINUED | OUTPATIENT
Start: 2020-06-13 | End: 2020-06-20

## 2020-06-13 RX ORDER — HYDRALAZINE HCL 50 MG
10 TABLET ORAL THREE TIMES A DAY
Refills: 0 | Status: DISCONTINUED | OUTPATIENT
Start: 2020-06-13 | End: 2020-06-20

## 2020-06-13 RX ORDER — CARVEDILOL PHOSPHATE 80 MG/1
25 CAPSULE, EXTENDED RELEASE ORAL EVERY 12 HOURS
Refills: 0 | Status: DISCONTINUED | OUTPATIENT
Start: 2020-06-13 | End: 2020-06-20

## 2020-06-13 RX ORDER — LISINOPRIL 2.5 MG/1
40 TABLET ORAL DAILY
Refills: 0 | Status: DISCONTINUED | OUTPATIENT
Start: 2020-06-13 | End: 2020-06-20

## 2020-06-13 RX ORDER — POTASSIUM CHLORIDE 20 MEQ
40 PACKET (EA) ORAL ONCE
Refills: 0 | Status: COMPLETED | OUTPATIENT
Start: 2020-06-13 | End: 2020-06-13

## 2020-06-13 RX ORDER — FUROSEMIDE 40 MG
80 TABLET ORAL
Refills: 0 | Status: DISCONTINUED | OUTPATIENT
Start: 2020-06-13 | End: 2020-06-20

## 2020-06-13 RX ADMIN — SIMVASTATIN 20 MILLIGRAM(S): 20 TABLET, FILM COATED ORAL at 21:13

## 2020-06-13 RX ADMIN — LISINOPRIL 40 MILLIGRAM(S): 2.5 TABLET ORAL at 11:35

## 2020-06-13 RX ADMIN — Medication 81 MILLIGRAM(S): at 11:34

## 2020-06-13 RX ADMIN — Medication 80 MILLIGRAM(S): at 17:09

## 2020-06-13 RX ADMIN — Medication 10 MILLIGRAM(S): at 13:59

## 2020-06-13 RX ADMIN — LISINOPRIL 30 MILLIGRAM(S): 2.5 TABLET ORAL at 05:03

## 2020-06-13 RX ADMIN — CARVEDILOL PHOSPHATE 12.5 MILLIGRAM(S): 80 CAPSULE, EXTENDED RELEASE ORAL at 05:03

## 2020-06-13 RX ADMIN — ISOSORBIDE DINITRATE 5 MILLIGRAM(S): 5 TABLET ORAL at 21:14

## 2020-06-13 RX ADMIN — CARVEDILOL PHOSPHATE 25 MILLIGRAM(S): 80 CAPSULE, EXTENDED RELEASE ORAL at 17:11

## 2020-06-13 RX ADMIN — HEPARIN SODIUM 5000 UNIT(S): 5000 INJECTION INTRAVENOUS; SUBCUTANEOUS at 17:11

## 2020-06-13 RX ADMIN — Medication 40 MILLIEQUIVALENT(S): at 10:44

## 2020-06-13 RX ADMIN — ISOSORBIDE DINITRATE 5 MILLIGRAM(S): 5 TABLET ORAL at 14:00

## 2020-06-13 RX ADMIN — Medication 40 MILLIGRAM(S): at 05:03

## 2020-06-13 RX ADMIN — HEPARIN SODIUM 5000 UNIT(S): 5000 INJECTION INTRAVENOUS; SUBCUTANEOUS at 05:03

## 2020-06-13 RX ADMIN — Medication 10 MILLIGRAM(S): at 21:13

## 2020-06-13 NOTE — PROGRESS NOTE ADULT - SUBJECTIVE AND OBJECTIVE BOX
EP ATTENDING    tele: NSR, no further NSVT    he denies palpitations, syncope, nor angina, ROS otherwise -    aspirin  chewable 81 milliGRAM(s) Oral daily  carvedilol 25 milliGRAM(s) Oral every 12 hours  furosemide   Injectable 40 milliGRAM(s) IV Push every 12 hours  heparin   Injectable 5000 Unit(s) SubCutaneous every 12 hours  lisinopril 40 milliGRAM(s) Oral daily  simvastatin 20 milliGRAM(s) Oral at bedtime                            11.4   7.86  )-----------( 236      ( 13 Jun 2020 06:30 )             38.0       06-13    139  |  104  |  19  ----------------------------<  100<H>  3.6   |  22  |  1.15    Ca    8.5      13 Jun 2020 06:30  Phos  3.3     06-13  Mg     2.0     06-13      T(C): 36.6 (06-13-20 @ 09:00), Max: 37.3 (06-12-20 @ 17:49)  HR: 72 (06-13-20 @ 09:00) (64 - 72)  BP: 153/86 (06-13-20 @ 09:00) (130/76 - 182/101)  RR: 18 (06-13-20 @ 09:00) (17 - 23)  SpO2: 99% (06-13-20 @ 09:00) (96% - 100%)  Wt(kg): --    I&O's Summary    12 Jun 2020 07:01  -  13 Jun 2020 07:00  --------------------------------------------------------  IN: 750 mL / OUT: 3450 mL / NET: -2700 mL    13 Jun 2020 07:01  -  13 Jun 2020 11:46  --------------------------------------------------------  IN: 200 mL / OUT: 1500 mL / NET: -1300 mL    A&O x 3  neurologically intact  JVP 12  RRR, no murmurs  CTAB  soft nt/nd  no c/c/e    echo: LVEF 38%  cath report from [facility] pending  cardiac MRI pending      A/P) 39 y/o male PMH HTN and "NICM" admitted with severe CHF. EP called for NSVT.    -continue IV diuresis  -increase coreg to 25mg bid  -increase lisinopril to 40mg daily  -add low dose bidil  -get cath report from [facility]  -get cardiac MRI w/wo contrast r/o infiltrative cardiomyopathy or HOCM  -no indication for an ICD at this time  -f/u with Dr Eliezer Lyons after discharge on Tue June 23rd at 1pm      Roberto Cintron M.D., RS  Cardiac Electrophysiology  [phone]

## 2020-06-13 NOTE — PROGRESS NOTE ADULT - SUBJECTIVE AND OBJECTIVE BOX
Subjective: No chest pain; dyspnea improved; ROS otherwise negative.   	    MEDICATIONS  (STANDING):  aspirin  chewable 81 milliGRAM(s) Oral daily  carvedilol 25 milliGRAM(s) Oral every 12 hours  furosemide   Injectable 40 milliGRAM(s) IV Push every 12 hours  heparin   Injectable 5000 Unit(s) SubCutaneous every 12 hours  lisinopril 40 milliGRAM(s) Oral daily  simvastatin 20 milliGRAM(s) Oral at bedtime    MEDICATIONS  (PRN):      LABS:                            11.4   7.86  )-----------( 236      ( 13 Jun 2020 06:30 )             38.0     139  |  104  |  19  ----------------------------<  100<H>  3.6   |  22  |  1.15    Ca    8.5      13 Jun 2020 06:30  Phos  3.3     06-13  Mg     2.0     06-13    Creatinine Trend: 1.15<--, 1.20<--, 1.25<--, 1.37<--     PHYSICAL EXAM  Vital Signs Last 24 Hrs  T(C): 36.6 (13 Jun 2020 09:00), Max: 37.3 (12 Jun 2020 17:49)  T(F): 97.9 (13 Jun 2020 09:00), Max: 99.1 (12 Jun 2020 17:49)  HR: 72 (13 Jun 2020 09:00) (64 - 72)  BP: 153/86 (13 Jun 2020 09:00) (130/76 - 182/101)  BP(mean): --  RR: 18 (13 Jun 2020 09:00) (17 - 23)  SpO2: 99% (13 Jun 2020 09:00) (96% - 100%)      Appearance: Normal	  HEENT:   Normal oral mucosa, PERRL, EOMI	  Lymphatic: No lymphadenopathy , + edema in LE bilaterally   Cardiovascular: Normal S1 S2, No JVD, No murmurs , Peripheral pulses palpable 2+ bilaterally  Respiratory: Lungs clear to auscultation, normal effort 	  Gastrointestinal:  Soft, Non-tender, + BS	  Skin: No rashes, No ecchymoses, No cyanosis, warm to touch  Musculoskeletal: Normal range of motion, normal strength  Psychiatry:  Mood & affect appropriate    TELEMETRY: SR, 5 NSVT	      DIAGNOSTIC TESTING:  < from: TTE with Doppler (w/Cont) (06.12.20 @ 08:33) >  1. Normal mitral valve. Minimal mitral regurgitation.  2. Severe concentric left ventricular hypertrophy.  3. Endocardium not well visualized; grossly moderate global  left ventricular systolic dysfunction.  Endocardial  visualization enhanced with intravenous injection of echo  contrast (Definity).  4. Severe diastolic dysfunction.  5. Unable to accurately evaluate right ventricular size or  systolic function.  *** No previous Echo exam.  < end of copied text >      ASSESSMENT/PLAN:  40 year old male with history of HFrEF (last TTE in Oct of 2019 demonstrated EF 40% and class III diastolic dysfunction), HTN who is being seen for management of heart failure.    -pt. still volume overloaded but volume status has improved  -continue with IV diuresis to keep O >I, increase lasix to 80IV BID  -TTE noted  -obtain prior cath report-- [facility] at [place] 4/2019*  -EP follow up noted

## 2020-06-14 LAB
ANION GAP SERPL CALC-SCNC: 13 MMO/L — SIGNIFICANT CHANGE UP (ref 7–14)
BASOPHILS # BLD AUTO: 0.05 K/UL — SIGNIFICANT CHANGE UP (ref 0–0.2)
BASOPHILS NFR BLD AUTO: 0.7 % — SIGNIFICANT CHANGE UP (ref 0–2)
BUN SERPL-MCNC: 20 MG/DL — SIGNIFICANT CHANGE UP (ref 7–23)
CALCIUM SERPL-MCNC: 8.6 MG/DL — SIGNIFICANT CHANGE UP (ref 8.4–10.5)
CHLORIDE SERPL-SCNC: 103 MMOL/L — SIGNIFICANT CHANGE UP (ref 98–107)
CO2 SERPL-SCNC: 24 MMOL/L — SIGNIFICANT CHANGE UP (ref 22–31)
CREAT SERPL-MCNC: 1.23 MG/DL — SIGNIFICANT CHANGE UP (ref 0.5–1.3)
EOSINOPHIL # BLD AUTO: 0.23 K/UL — SIGNIFICANT CHANGE UP (ref 0–0.5)
EOSINOPHIL NFR BLD AUTO: 3.1 % — SIGNIFICANT CHANGE UP (ref 0–6)
GLUCOSE SERPL-MCNC: 80 MG/DL — SIGNIFICANT CHANGE UP (ref 70–99)
HCT VFR BLD CALC: 37.4 % — LOW (ref 39–50)
HGB BLD-MCNC: 11.1 G/DL — LOW (ref 13–17)
IMM GRANULOCYTES NFR BLD AUTO: 0.3 % — SIGNIFICANT CHANGE UP (ref 0–1.5)
LYMPHOCYTES # BLD AUTO: 1.88 K/UL — SIGNIFICANT CHANGE UP (ref 1–3.3)
LYMPHOCYTES # BLD AUTO: 25.6 % — SIGNIFICANT CHANGE UP (ref 13–44)
MAGNESIUM SERPL-MCNC: 2.1 MG/DL — SIGNIFICANT CHANGE UP (ref 1.6–2.6)
MCHC RBC-ENTMCNC: 20.5 PG — LOW (ref 27–34)
MCHC RBC-ENTMCNC: 29.7 % — LOW (ref 32–36)
MCV RBC AUTO: 69 FL — LOW (ref 80–100)
MONOCYTES # BLD AUTO: 0.65 K/UL — SIGNIFICANT CHANGE UP (ref 0–0.9)
MONOCYTES NFR BLD AUTO: 8.9 % — SIGNIFICANT CHANGE UP (ref 2–14)
NEUTROPHILS # BLD AUTO: 4.5 K/UL — SIGNIFICANT CHANGE UP (ref 1.8–7.4)
NEUTROPHILS NFR BLD AUTO: 61.4 % — SIGNIFICANT CHANGE UP (ref 43–77)
NRBC # FLD: 0 K/UL — SIGNIFICANT CHANGE UP (ref 0–0)
PLATELET # BLD AUTO: 217 K/UL — SIGNIFICANT CHANGE UP (ref 150–400)
PMV BLD: SIGNIFICANT CHANGE UP FL (ref 7–13)
POTASSIUM SERPL-MCNC: 3.7 MMOL/L — SIGNIFICANT CHANGE UP (ref 3.5–5.3)
POTASSIUM SERPL-SCNC: 3.7 MMOL/L — SIGNIFICANT CHANGE UP (ref 3.5–5.3)
RBC # BLD: 5.42 M/UL — SIGNIFICANT CHANGE UP (ref 4.2–5.8)
RBC # FLD: 19.6 % — HIGH (ref 10.3–14.5)
SODIUM SERPL-SCNC: 140 MMOL/L — SIGNIFICANT CHANGE UP (ref 135–145)
WBC # BLD: 7.33 K/UL — SIGNIFICANT CHANGE UP (ref 3.8–10.5)
WBC # FLD AUTO: 7.33 K/UL — SIGNIFICANT CHANGE UP (ref 3.8–10.5)

## 2020-06-14 RX ORDER — POTASSIUM CHLORIDE 20 MEQ
40 PACKET (EA) ORAL ONCE
Refills: 0 | Status: COMPLETED | OUTPATIENT
Start: 2020-06-14 | End: 2020-06-14

## 2020-06-14 RX ADMIN — CARVEDILOL PHOSPHATE 25 MILLIGRAM(S): 80 CAPSULE, EXTENDED RELEASE ORAL at 05:42

## 2020-06-14 RX ADMIN — LISINOPRIL 40 MILLIGRAM(S): 2.5 TABLET ORAL at 05:42

## 2020-06-14 RX ADMIN — HEPARIN SODIUM 5000 UNIT(S): 5000 INJECTION INTRAVENOUS; SUBCUTANEOUS at 05:42

## 2020-06-14 RX ADMIN — HEPARIN SODIUM 5000 UNIT(S): 5000 INJECTION INTRAVENOUS; SUBCUTANEOUS at 17:14

## 2020-06-14 RX ADMIN — Medication 80 MILLIGRAM(S): at 17:13

## 2020-06-14 RX ADMIN — ISOSORBIDE DINITRATE 5 MILLIGRAM(S): 5 TABLET ORAL at 21:32

## 2020-06-14 RX ADMIN — Medication 80 MILLIGRAM(S): at 05:42

## 2020-06-14 RX ADMIN — Medication 81 MILLIGRAM(S): at 13:56

## 2020-06-14 RX ADMIN — Medication 40 MILLIEQUIVALENT(S): at 08:59

## 2020-06-14 RX ADMIN — ISOSORBIDE DINITRATE 5 MILLIGRAM(S): 5 TABLET ORAL at 05:42

## 2020-06-14 RX ADMIN — Medication 10 MILLIGRAM(S): at 21:32

## 2020-06-14 RX ADMIN — SIMVASTATIN 20 MILLIGRAM(S): 20 TABLET, FILM COATED ORAL at 21:32

## 2020-06-14 RX ADMIN — CARVEDILOL PHOSPHATE 25 MILLIGRAM(S): 80 CAPSULE, EXTENDED RELEASE ORAL at 17:14

## 2020-06-14 RX ADMIN — ISOSORBIDE DINITRATE 5 MILLIGRAM(S): 5 TABLET ORAL at 13:56

## 2020-06-14 RX ADMIN — Medication 10 MILLIGRAM(S): at 05:42

## 2020-06-14 RX ADMIN — Medication 10 MILLIGRAM(S): at 13:56

## 2020-06-14 NOTE — PROVIDER CONTACT NOTE (OTHER) - BACKGROUND
Patient admitted for Heart failure and hypertension. Patient being diuresed pending evaluation for further interventions, Strict BP parameters in place

## 2020-06-14 NOTE — PROGRESS NOTE ADULT - SUBJECTIVE AND OBJECTIVE BOX
Subjective:  No chest pain; dyspnea improved; ROS otherwise negative.   	      aspirin  chewable 81 milliGRAM(s) Oral daily  carvedilol 25 milliGRAM(s) Oral every 12 hours  furosemide   Injectable 80 milliGRAM(s) IV Push two times a day  heparin   Injectable 5000 Unit(s) SubCutaneous every 12 hours  hydrALAZINE 10 milliGRAM(s) Oral three times a day  isosorbide   dinitrate Tablet (ISORDIL) 5 milliGRAM(s) Oral three times a day  lisinopril 40 milliGRAM(s) Oral daily  simvastatin 20 milliGRAM(s) Oral at bedtime                          11.4   7.86  )-----------( 236      ( 13 Jun 2020 06:30 )             38.0       Hemoglobin: 11.4 g/dL (06-13 @ 06:30)  Hemoglobin: 11.3 g/dL (06-12 @ 06:55)  Hemoglobin: 11.9 g/dL (06-11 @ 05:05)  Hemoglobin: 11.7 g/dL (06-10 @ 21:57)      06-13    139  |  104  |  19  ----------------------------<  100<H>  3.6   |  22  |  1.15    Ca    8.5      13 Jun 2020 06:30  Phos  3.3     06-13  Mg     2.0     06-13      Creatinine Trend: 1.15<--, 1.20<--, 1.25<--, 1.37<--    COAGS:       PHYSICAL EXAM:  Vital Signs last 24 Hours   T(C): 36.4 (06-14-20 @ 05:39), Max: 36.7 (06-13-20 @ 17:00)  HR: 67 (06-14-20 @ 05:39) (61 - 74)  BP: 126/68 (06-14-20 @ 05:39) (126/68 - 157/90)  RR: 16 (06-14-20 @ 05:39) (16 - 18)  SpO2: 98% (06-14-20 @ 05:39) (98% - 100%)  Wt(kg): --    I&O's Summary    13 Jun 2020 07:01  -  14 Jun 2020 07:00  --------------------------------------------------------  IN: 600 mL / OUT: 3400 mL / NET: -2800 mL    14 Jun 2020 07:01  -  14 Jun 2020 09:10  --------------------------------------------------------  IN: 150 mL / OUT: 600 mL / NET: -450 mL          Appearance: Normal	  HEENT:   Normal oral mucosa, PERRL, EOMI	  Lymphatic: No lymphadenopathy , + edema in LE bilaterally   Cardiovascular: Normal S1 S2, No JVD, No murmurs , Peripheral pulses palpable 2+ bilaterally  Respiratory: Lungs clear to auscultation, normal effort 	  Gastrointestinal:  Soft, Non-tender, + BS	  Skin: No rashes, No ecchymoses, No cyanosis, warm to touch  Musculoskeletal: Normal range of motion, normal strength  Psychiatry:  Mood & affect appropriate    TELEMETRY:       SR, NSVT, AIVR     DIAGNOSTIC TESTING:  < from: TTE with Doppler (w/Cont) (06.12.20 @ 08:33) >  1. Normal mitral valve. Minimal mitral regurgitation.  2. Severe concentric left ventricular hypertrophy.  3. Endocardium not well visualized; grossly moderate global  left ventricular systolic dysfunction.  Endocardial  visualization enhanced with intravenous injection of echo  contrast (Definity).  4. Severe diastolic dysfunction.  5. Unable to accurately evaluate right ventricular size or  systolic function.  *** No previous Echo exam.  < end of copied text >      ASSESSMENT/PLAN:           40 year old male with history of HFrEF (last TTE in Oct of 2019 demonstrated EF 40% and class III diastolic dysfunction), HTN who is being seen for management of heart failure.      -pt. still volume overloaded but volume status has improving on iv diuretic   -continue with IV diuresis to keep O >I, tolerating lasix to 80IV BID  -TTE noted   -obtain prior cath report-- [facility] at [place] 4/2018 (per pt)   -EP follow up appreciated  -further reccs pending above Subjective:  No chest pain; dyspnea improved; ROS otherwise negative.   	      aspirin  chewable 81 milliGRAM(s) Oral daily  carvedilol 25 milliGRAM(s) Oral every 12 hours  furosemide   Injectable 80 milliGRAM(s) IV Push two times a day  heparin   Injectable 5000 Unit(s) SubCutaneous every 12 hours  hydrALAZINE 10 milliGRAM(s) Oral three times a day  isosorbide   dinitrate Tablet (ISORDIL) 5 milliGRAM(s) Oral three times a day  lisinopril 40 milliGRAM(s) Oral daily  simvastatin 20 milliGRAM(s) Oral at bedtime                          11.4   7.86  )-----------( 236      ( 13 Jun 2020 06:30 )             38.0       Hemoglobin: 11.4 g/dL (06-13 @ 06:30)  Hemoglobin: 11.3 g/dL (06-12 @ 06:55)  Hemoglobin: 11.9 g/dL (06-11 @ 05:05)  Hemoglobin: 11.7 g/dL (06-10 @ 21:57)      06-13    139  |  104  |  19  ----------------------------<  100<H>  3.6   |  22  |  1.15    Ca    8.5      13 Jun 2020 06:30  Phos  3.3     06-13  Mg     2.0     06-13      Creatinine Trend: 1.15<--, 1.20<--, 1.25<--, 1.37<--    COAGS:       PHYSICAL EXAM:  Vital Signs last 24 Hours   T(C): 36.4 (06-14-20 @ 05:39), Max: 36.7 (06-13-20 @ 17:00)  HR: 67 (06-14-20 @ 05:39) (61 - 74)  BP: 126/68 (06-14-20 @ 05:39) (126/68 - 157/90)  RR: 16 (06-14-20 @ 05:39) (16 - 18)  SpO2: 98% (06-14-20 @ 05:39) (98% - 100%)  Wt(kg): --    I&O's Summary    13 Jun 2020 07:01  -  14 Jun 2020 07:00  --------------------------------------------------------  IN: 600 mL / OUT: 3400 mL / NET: -2800 mL    14 Jun 2020 07:01  -  14 Jun 2020 09:10  --------------------------------------------------------  IN: 150 mL / OUT: 600 mL / NET: -450 mL          Appearance: Normal	  HEENT:   Normal oral mucosa, PERRL, EOMI	  Lymphatic: No lymphadenopathy , + edema in LE bilaterally   Cardiovascular: Normal S1 S2, No JVD, No murmurs , Peripheral pulses palpable 2+ bilaterally  Respiratory: Lungs clear to auscultation, normal effort 	  Gastrointestinal:  Soft, Non-tender, + BS	  Skin: No rashes, No ecchymoses, No cyanosis, warm to touch  Musculoskeletal: Normal range of motion, normal strength  Psychiatry:  Mood & affect appropriate    TELEMETRY:       SR, NSVT, AIVR     DIAGNOSTIC TESTING:  < from: TTE with Doppler (w/Cont) (06.12.20 @ 08:33) >  1. Normal mitral valve. Minimal mitral regurgitation.  2. Severe concentric left ventricular hypertrophy.  3. Endocardium not well visualized; grossly moderate global  left ventricular systolic dysfunction.  Endocardial  visualization enhanced with intravenous injection of echo  contrast (Definity).  4. Severe diastolic dysfunction.  5. Unable to accurately evaluate right ventricular size or  systolic function.  *** No previous Echo exam.  < end of copied text >      ASSESSMENT/PLAN:           40 year old male with history of HFrEF (last TTE in Oct of 2019 demonstrated EF 40% and class III diastolic dysfunction), HTN who is being seen for management of heart failure.      -pt. still volume overloaded but volume status has improving on iv diuretic   -continue with IV diuresis to keep O >I, tolerating lasix to 80IV BID, Cr stable   -strict I&O, diuresing well > 3L last 24 hrs   -TTE noted   -obtain prior cath report-- [facility] at [place] 4/2018 (per pt)   -EP follow up appreciated  -further reccs pending above Subjective:  No chest pain; dyspnea improved; ROS otherwise negative.   	      aspirin  chewable 81 milliGRAM(s) Oral daily  carvedilol 25 milliGRAM(s) Oral every 12 hours  furosemide   Injectable 80 milliGRAM(s) IV Push two times a day  heparin   Injectable 5000 Unit(s) SubCutaneous every 12 hours  hydrALAZINE 10 milliGRAM(s) Oral three times a day  isosorbide   dinitrate Tablet (ISORDIL) 5 milliGRAM(s) Oral three times a day  lisinopril 40 milliGRAM(s) Oral daily  simvastatin 20 milliGRAM(s) Oral at bedtime                          11.4   7.86  )-----------( 236      ( 13 Jun 2020 06:30 )             38.0       Hemoglobin: 11.4 g/dL (06-13 @ 06:30)  Hemoglobin: 11.3 g/dL (06-12 @ 06:55)  Hemoglobin: 11.9 g/dL (06-11 @ 05:05)  Hemoglobin: 11.7 g/dL (06-10 @ 21:57)      06-13    139  |  104  |  19  ----------------------------<  100<H>  3.6   |  22  |  1.15    Ca    8.5      13 Jun 2020 06:30  Phos  3.3     06-13  Mg     2.0     06-13      Creatinine Trend: 1.15<--, 1.20<--, 1.25<--, 1.37<--    COAGS:       PHYSICAL EXAM:  Vital Signs last 24 Hours   T(C): 36.4 (06-14-20 @ 05:39), Max: 36.7 (06-13-20 @ 17:00)  HR: 67 (06-14-20 @ 05:39) (61 - 74)  BP: 126/68 (06-14-20 @ 05:39) (126/68 - 157/90)  RR: 16 (06-14-20 @ 05:39) (16 - 18)  SpO2: 98% (06-14-20 @ 05:39) (98% - 100%)  Wt(kg): --    I&O's Summary    13 Jun 2020 07:01  -  14 Jun 2020 07:00  --------------------------------------------------------  IN: 600 mL / OUT: 3400 mL / NET: -2800 mL    14 Jun 2020 07:01  -  14 Jun 2020 09:10  --------------------------------------------------------  IN: 150 mL / OUT: 600 mL / NET: -450 mL          Appearance: Normal	  HEENT:   Normal oral mucosa, PERRL, EOMI	  Lymphatic: No lymphadenopathy , + edema in LE bilaterally   Cardiovascular: Normal S1 S2, No JVD, No murmurs , Peripheral pulses palpable 2+ bilaterally  Respiratory: Lungs clear to auscultation, normal effort 	  Gastrointestinal:  Soft, Non-tender, + BS	  Skin: No rashes, No ecchymoses, No cyanosis, warm to touch  Musculoskeletal: Normal range of motion, normal strength  Psychiatry:  Mood & affect appropriate    TELEMETRY:       SR, NSVT, AIVR     DIAGNOSTIC TESTING:  < from: TTE with Doppler (w/Cont) (06.12.20 @ 08:33) >  1. Normal mitral valve. Minimal mitral regurgitation.  2. Severe concentric left ventricular hypertrophy.  3. Endocardium not well visualized; grossly moderate global  left ventricular systolic dysfunction.  Endocardial  visualization enhanced with intravenous injection of echo  contrast (Definity).  4. Severe diastolic dysfunction.  5. Unable to accurately evaluate right ventricular size or  systolic function.  *** No previous Echo exam.  < end of copied text >      ASSESSMENT/PLAN:           40 year old male with history of HFrEF (last TTE in Oct of 2019 demonstrated EF 40% and class III diastolic dysfunction), HTN who is being seen for management of heart failure.      -pt. still volume overloaded but volume status has improving on iv diuretic   -continue with IV diuresis to keep O >I, tolerating lasix to 80IV BID, Cr stable   -strict I&O, diuresing well > 3L last 24 hrs   -TTE noted   -obtain prior cath report-- [facility] at [place] 4/2018 (per pt)   -EP follow up appreciated, f/u cardiac MRI   -further reccs pending above

## 2020-06-14 NOTE — PROGRESS NOTE ADULT - SUBJECTIVE AND OBJECTIVE BOX
EP ATTENDING    tele: NSR, no further NSVT    he denies palpitations, syncope, nor angina, ROS otherwise -    aspirin  chewable 81 milliGRAM(s) Oral daily  carvedilol 25 milliGRAM(s) Oral every 12 hours  furosemide   Injectable 80 milliGRAM(s) IV Push two times a day  heparin   Injectable 5000 Unit(s) SubCutaneous every 12 hours  hydrALAZINE 10 milliGRAM(s) Oral three times a day  isosorbide   dinitrate Tablet (ISORDIL) 5 milliGRAM(s) Oral three times a day  lisinopril 40 milliGRAM(s) Oral daily  simvastatin 20 milliGRAM(s) Oral at bedtime                            11.1   7.33  )-----------( 217      ( 14 Jun 2020 06:15 )             37.4       06-14    140  |  103  |  20  ----------------------------<  80  3.7   |  24  |  1.23    Ca    8.6      14 Jun 2020 06:15  Phos  3.3     06-13  Mg     2.1     06-14              T(C): 36.5 (06-14-20 @ 09:34), Max: 36.7 (06-13-20 @ 17:00)  HR: 64 (06-14-20 @ 09:34) (61 - 67)  BP: 138/70 (06-14-20 @ 09:34) (126/68 - 152/85)  RR: 17 (06-14-20 @ 09:34) (16 - 18)  SpO2: 98% (06-14-20 @ 09:34) (98% - 100%)  Wt(kg): --    I&O's Summary    13 Jun 2020 07:01  -  14 Jun 2020 07:00  --------------------------------------------------------  IN: 600 mL / OUT: 3400 mL / NET: -2800 mL    14 Jun 2020 07:01  -  14 Jun 2020 13:44  --------------------------------------------------------  IN: 150 mL / OUT: 1100 mL / NET: -950 mL        A&O x 3  neurologically intact  JVP 12  RRR, no murmurs  CTAB  soft nt/nd  no c/c/e    echo: LVEF 38%  cath report from [place] pending  cardiac MRI pending      A/P) 41 y/o male PMH HTN and "NICM" admitted with severe CHF. EP called for NSVT.    -continue IV diuresis  -continue coreg, lisinopril and bidil  -get cath report from [place]  -get cardiac MRI w/wo contrast r/o infiltrative cardiomyopathy or HOCM  -no indication for an ICD at this time  -f/u with Dr Eliezer Lyons after discharge on Tue June 23rd at 1pm      Roberto Cintron M.D., RS  Cardiac Electrophysiology  [phone]

## 2020-06-15 LAB
ANION GAP SERPL CALC-SCNC: 11 MMO/L — SIGNIFICANT CHANGE UP (ref 7–14)
BASOPHILS # BLD AUTO: 0.07 K/UL — SIGNIFICANT CHANGE UP (ref 0–0.2)
BASOPHILS NFR BLD AUTO: 1.1 % — SIGNIFICANT CHANGE UP (ref 0–2)
BUN SERPL-MCNC: 19 MG/DL — SIGNIFICANT CHANGE UP (ref 7–23)
CALCIUM SERPL-MCNC: 8.8 MG/DL — SIGNIFICANT CHANGE UP (ref 8.4–10.5)
CHLORIDE SERPL-SCNC: 104 MMOL/L — SIGNIFICANT CHANGE UP (ref 98–107)
CO2 SERPL-SCNC: 24 MMOL/L — SIGNIFICANT CHANGE UP (ref 22–31)
CREAT SERPL-MCNC: 1.25 MG/DL — SIGNIFICANT CHANGE UP (ref 0.5–1.3)
EOSINOPHIL # BLD AUTO: 0.23 K/UL — SIGNIFICANT CHANGE UP (ref 0–0.5)
EOSINOPHIL NFR BLD AUTO: 3.5 % — SIGNIFICANT CHANGE UP (ref 0–6)
GLUCOSE SERPL-MCNC: 101 MG/DL — HIGH (ref 70–99)
HCT VFR BLD CALC: 37.1 % — LOW (ref 39–50)
HGB BLD-MCNC: 11.4 G/DL — LOW (ref 13–17)
IMM GRANULOCYTES NFR BLD AUTO: 0.2 % — SIGNIFICANT CHANGE UP (ref 0–1.5)
LYMPHOCYTES # BLD AUTO: 1.51 K/UL — SIGNIFICANT CHANGE UP (ref 1–3.3)
LYMPHOCYTES # BLD AUTO: 23.1 % — SIGNIFICANT CHANGE UP (ref 13–44)
MAGNESIUM SERPL-MCNC: 2.2 MG/DL — SIGNIFICANT CHANGE UP (ref 1.6–2.6)
MCHC RBC-ENTMCNC: 21 PG — LOW (ref 27–34)
MCHC RBC-ENTMCNC: 30.7 % — LOW (ref 32–36)
MCV RBC AUTO: 68.5 FL — LOW (ref 80–100)
MONOCYTES # BLD AUTO: 0.59 K/UL — SIGNIFICANT CHANGE UP (ref 0–0.9)
MONOCYTES NFR BLD AUTO: 9 % — SIGNIFICANT CHANGE UP (ref 2–14)
NEUTROPHILS # BLD AUTO: 4.14 K/UL — SIGNIFICANT CHANGE UP (ref 1.8–7.4)
NEUTROPHILS NFR BLD AUTO: 63.1 % — SIGNIFICANT CHANGE UP (ref 43–77)
NRBC # FLD: 0 K/UL — SIGNIFICANT CHANGE UP (ref 0–0)
PLATELET # BLD AUTO: 209 K/UL — SIGNIFICANT CHANGE UP (ref 150–400)
PMV BLD: SIGNIFICANT CHANGE UP FL (ref 7–13)
POTASSIUM SERPL-MCNC: 4 MMOL/L — SIGNIFICANT CHANGE UP (ref 3.5–5.3)
POTASSIUM SERPL-SCNC: 4 MMOL/L — SIGNIFICANT CHANGE UP (ref 3.5–5.3)
RBC # BLD: 5.42 M/UL — SIGNIFICANT CHANGE UP (ref 4.2–5.8)
RBC # FLD: 19.1 % — HIGH (ref 10.3–14.5)
SODIUM SERPL-SCNC: 139 MMOL/L — SIGNIFICANT CHANGE UP (ref 135–145)
WBC # BLD: 6.55 K/UL — SIGNIFICANT CHANGE UP (ref 3.8–10.5)
WBC # FLD AUTO: 6.55 K/UL — SIGNIFICANT CHANGE UP (ref 3.8–10.5)

## 2020-06-15 RX ADMIN — Medication 10 MILLIGRAM(S): at 06:20

## 2020-06-15 RX ADMIN — ISOSORBIDE DINITRATE 5 MILLIGRAM(S): 5 TABLET ORAL at 22:22

## 2020-06-15 RX ADMIN — Medication 10 MILLIGRAM(S): at 14:07

## 2020-06-15 RX ADMIN — Medication 10 MILLIGRAM(S): at 22:22

## 2020-06-15 RX ADMIN — HEPARIN SODIUM 5000 UNIT(S): 5000 INJECTION INTRAVENOUS; SUBCUTANEOUS at 18:26

## 2020-06-15 RX ADMIN — Medication 80 MILLIGRAM(S): at 06:19

## 2020-06-15 RX ADMIN — Medication 81 MILLIGRAM(S): at 12:45

## 2020-06-15 RX ADMIN — CARVEDILOL PHOSPHATE 25 MILLIGRAM(S): 80 CAPSULE, EXTENDED RELEASE ORAL at 06:19

## 2020-06-15 RX ADMIN — ISOSORBIDE DINITRATE 5 MILLIGRAM(S): 5 TABLET ORAL at 06:20

## 2020-06-15 RX ADMIN — ISOSORBIDE DINITRATE 5 MILLIGRAM(S): 5 TABLET ORAL at 14:07

## 2020-06-15 RX ADMIN — SIMVASTATIN 20 MILLIGRAM(S): 20 TABLET, FILM COATED ORAL at 22:22

## 2020-06-15 RX ADMIN — CARVEDILOL PHOSPHATE 25 MILLIGRAM(S): 80 CAPSULE, EXTENDED RELEASE ORAL at 18:24

## 2020-06-15 RX ADMIN — LISINOPRIL 40 MILLIGRAM(S): 2.5 TABLET ORAL at 06:19

## 2020-06-15 RX ADMIN — HEPARIN SODIUM 5000 UNIT(S): 5000 INJECTION INTRAVENOUS; SUBCUTANEOUS at 06:19

## 2020-06-15 RX ADMIN — Medication 80 MILLIGRAM(S): at 18:33

## 2020-06-15 NOTE — PROGRESS NOTE ADULT - SUBJECTIVE AND OBJECTIVE BOX
Subjective:  No chest pain; dyspnea improved; ROS otherwise negative.     MEDICATIONS  (STANDING):  aspirin  chewable 81 milliGRAM(s) Oral daily  carvedilol 25 milliGRAM(s) Oral every 12 hours  furosemide   Injectable 80 milliGRAM(s) IV Push two times a day  heparin   Injectable 5000 Unit(s) SubCutaneous every 12 hours  hydrALAZINE 10 milliGRAM(s) Oral three times a day  isosorbide   dinitrate Tablet (ISORDIL) 5 milliGRAM(s) Oral three times a day  lisinopril 40 milliGRAM(s) Oral daily  simvastatin 20 milliGRAM(s) Oral at bedtime    LABS:                        11.4   6.55  )-----------( 209      ( 15 Slade 2020 06:33 )             37.1     139  |  104  |  19  ----------------------------<  101<H>  4.0   |  24  |  1.25    Ca    8.8      15 Slade 2020 06:33  Mg     2.2     06-15    Creatinine Trend: 1.25<--, 1.23<--, 1.15<--, 1.20<--, 1.25<--, 1.37<--     PHYSICAL EXAM  Vital Signs Last 24 Hrs  T(C): 36.9 (15 Slade 2020 12:42), Max: 36.9 (15 Slade 2020 12:42)  T(F): 98.5 (15 Slade 2020 12:42), Max: 98.5 (15 Slade 2020 12:42)  HR: 61 (15 Slade 2020 12:42) (61 - 66)  BP: 142/81 (15 Slade 2020 12:42) (128/68 - 149/89)  RR: 17 (15 Slade 2020 12:42) (16 - 17)  SpO2: 100% (15 Slade 2020 12:42) (100% - 100%)    Appearance: Normal	  HEENT:   Normal oral mucosa, PERRL, EOMI	  Lymphatic: No lymphadenopathy , + edema in LE bilaterally   Cardiovascular: Normal S1 S2, No JVD, No murmurs , Peripheral pulses palpable 2+ bilaterally  Respiratory: Lungs clear to auscultation, normal effort 	  Gastrointestinal:  Soft, Non-tender, + BS	  Skin: No rashes, No ecchymoses, No cyanosis, warm to touch  Musculoskeletal: Normal range of motion, normal strength  Psychiatry:  Mood & affect appropriate    TELEMETRY:       SR, NSVT, AIVR     DIAGNOSTIC TESTING:  < from: TTE with Doppler (w/Cont) (06.12.20 @ 08:33) >  1. Normal mitral valve. Minimal mitral regurgitation.  2. Severe concentric left ventricular hypertrophy.  3. Endocardium not well visualized; grossly moderate global  left ventricular systolic dysfunction.  Endocardial  visualization enhanced with intravenous injection of echo  contrast (Definity).  4. Severe diastolic dysfunction.  5. Unable to accurately evaluate right ventricular size or  systolic function.  *** No previous Echo exam.  < end of copied text >      ASSESSMENT/PLAN:           40 year old male with history of HFrEF (last TTE in Oct of 2019 demonstrated EF 40% and class III diastolic dysfunction), HTN who is being seen for management of heart failure.    -pt. still volume overloaded but volume status improving  -continue with IV diuresis to keep O >I, tolerating lasix to 80IV BID, Cr stable   -TTE noted   -obtain prior cath report-- [facility] at [place] 4/2018 (per pt)   -EP follow up appreciated, f/u cardiac MRI   -further reccs pending above

## 2020-06-15 NOTE — PROGRESS NOTE ADULT - SUBJECTIVE AND OBJECTIVE BOX
EP ATTENDING    tele: NSR, rare PVCs    he denies palpitations, syncope, nor angina, ROS otherwise -    aspirin  chewable 81 milliGRAM(s) Oral daily  carvedilol 25 milliGRAM(s) Oral every 12 hours  furosemide   Injectable 80 milliGRAM(s) IV Push two times a day  heparin   Injectable 5000 Unit(s) SubCutaneous every 12 hours  hydrALAZINE 10 milliGRAM(s) Oral three times a day  isosorbide   dinitrate Tablet (ISORDIL) 5 milliGRAM(s) Oral three times a day  lisinopril 40 milliGRAM(s) Oral daily  simvastatin 20 milliGRAM(s) Oral at bedtime                            11.4   6.55  )-----------( 209      ( 15 Slade 2020 06:33 )             37.1       06-15    139  |  104  |  19  ----------------------------<  101<H>  4.0   |  24  |  1.25    Ca    8.8      15 Slade 2020 06:33  Mg     2.2     06-15              T(C): 36.6 (06-15-20 @ 06:18), Max: 36.6 (06-14-20 @ 13:30)  HR: 61 (06-15-20 @ 06:18) (61 - 66)  BP: 149/89 (06-15-20 @ 06:18) (128/68 - 149/89)  RR: 16 (06-15-20 @ 06:18) (16 - 16)  SpO2: 100% (06-15-20 @ 06:18) (99% - 100%)  Wt(kg): --    I&O's Summary    14 Jun 2020 07:01  -  15 Slade 2020 07:00  --------------------------------------------------------  IN: 300 mL / OUT: 2900 mL / NET: -2600 mL        A&O x 3  neurologically intact  JVP 12  RRR, no murmurs  CTAB  soft nt/nd  no c/c/e    echo: LVEF 38%  cath report from [place] pending  cardiac MRI pending      A/P) 41 y/o male PMH HTN and "NICM" admitted with severe CHF. EP called for NSVT.    -continue IV diuresis  -continue coreg, lisinopril and bidil  -get cath report from [place]  -get cardiac MRI w/wo contrast r/o infiltrative cardiomyopathy or HOCM  -no indication for an ICD at this time  -f/u with Dr Eliezer Lyons after discharge on Tue June 23rd at 1pm      Roberto Cintron M.D., RS  Cardiac Electrophysiology  [phone]

## 2020-06-16 LAB
ANION GAP SERPL CALC-SCNC: 11 MMO/L — SIGNIFICANT CHANGE UP (ref 7–14)
BUN SERPL-MCNC: 18 MG/DL — SIGNIFICANT CHANGE UP (ref 7–23)
CALCIUM SERPL-MCNC: 8.8 MG/DL — SIGNIFICANT CHANGE UP (ref 8.4–10.5)
CHLORIDE SERPL-SCNC: 104 MMOL/L — SIGNIFICANT CHANGE UP (ref 98–107)
CO2 SERPL-SCNC: 24 MMOL/L — SIGNIFICANT CHANGE UP (ref 22–31)
CREAT SERPL-MCNC: 1.17 MG/DL — SIGNIFICANT CHANGE UP (ref 0.5–1.3)
GLUCOSE SERPL-MCNC: 94 MG/DL — SIGNIFICANT CHANGE UP (ref 70–99)
HCT VFR BLD CALC: 37.1 % — LOW (ref 39–50)
HGB BLD-MCNC: 11.1 G/DL — LOW (ref 13–17)
MAGNESIUM SERPL-MCNC: 2.2 MG/DL — SIGNIFICANT CHANGE UP (ref 1.6–2.6)
MCHC RBC-ENTMCNC: 20.6 PG — LOW (ref 27–34)
MCHC RBC-ENTMCNC: 29.9 % — LOW (ref 32–36)
MCV RBC AUTO: 68.8 FL — LOW (ref 80–100)
NRBC # FLD: 0 K/UL — SIGNIFICANT CHANGE UP (ref 0–0)
PHOSPHATE SERPL-MCNC: 3.8 MG/DL — SIGNIFICANT CHANGE UP (ref 2.5–4.5)
PLATELET # BLD AUTO: 196 K/UL — SIGNIFICANT CHANGE UP (ref 150–400)
PMV BLD: SIGNIFICANT CHANGE UP FL (ref 7–13)
POTASSIUM SERPL-MCNC: 3.9 MMOL/L — SIGNIFICANT CHANGE UP (ref 3.5–5.3)
POTASSIUM SERPL-SCNC: 3.9 MMOL/L — SIGNIFICANT CHANGE UP (ref 3.5–5.3)
RBC # BLD: 5.39 M/UL — SIGNIFICANT CHANGE UP (ref 4.2–5.8)
RBC # FLD: 19.4 % — HIGH (ref 10.3–14.5)
SODIUM SERPL-SCNC: 139 MMOL/L — SIGNIFICANT CHANGE UP (ref 135–145)
WBC # BLD: 6.71 K/UL — SIGNIFICANT CHANGE UP (ref 3.8–10.5)
WBC # FLD AUTO: 6.71 K/UL — SIGNIFICANT CHANGE UP (ref 3.8–10.5)

## 2020-06-16 RX ADMIN — Medication 10 MILLIGRAM(S): at 22:38

## 2020-06-16 RX ADMIN — SIMVASTATIN 20 MILLIGRAM(S): 20 TABLET, FILM COATED ORAL at 22:38

## 2020-06-16 RX ADMIN — CARVEDILOL PHOSPHATE 25 MILLIGRAM(S): 80 CAPSULE, EXTENDED RELEASE ORAL at 05:59

## 2020-06-16 RX ADMIN — LISINOPRIL 40 MILLIGRAM(S): 2.5 TABLET ORAL at 05:59

## 2020-06-16 RX ADMIN — Medication 80 MILLIGRAM(S): at 05:59

## 2020-06-16 RX ADMIN — ISOSORBIDE DINITRATE 5 MILLIGRAM(S): 5 TABLET ORAL at 05:59

## 2020-06-16 RX ADMIN — ISOSORBIDE DINITRATE 5 MILLIGRAM(S): 5 TABLET ORAL at 13:44

## 2020-06-16 RX ADMIN — Medication 81 MILLIGRAM(S): at 13:44

## 2020-06-16 RX ADMIN — Medication 80 MILLIGRAM(S): at 19:52

## 2020-06-16 RX ADMIN — HEPARIN SODIUM 5000 UNIT(S): 5000 INJECTION INTRAVENOUS; SUBCUTANEOUS at 05:59

## 2020-06-16 RX ADMIN — Medication 10 MILLIGRAM(S): at 13:44

## 2020-06-16 RX ADMIN — HEPARIN SODIUM 5000 UNIT(S): 5000 INJECTION INTRAVENOUS; SUBCUTANEOUS at 17:24

## 2020-06-16 RX ADMIN — CARVEDILOL PHOSPHATE 25 MILLIGRAM(S): 80 CAPSULE, EXTENDED RELEASE ORAL at 18:04

## 2020-06-16 RX ADMIN — Medication 10 MILLIGRAM(S): at 05:59

## 2020-06-16 RX ADMIN — ISOSORBIDE DINITRATE 5 MILLIGRAM(S): 5 TABLET ORAL at 22:38

## 2020-06-16 NOTE — PROGRESS NOTE ADULT - SUBJECTIVE AND OBJECTIVE BOX
S:  pt denies palpitations, syncope or angina. Review of systems otherwise negative.       aspirin  chewable 81 milliGRAM(s) Oral daily  carvedilol 25 milliGRAM(s) Oral every 12 hours  furosemide   Injectable 80 milliGRAM(s) IV Push two times a day  heparin   Injectable 5000 Unit(s) SubCutaneous every 12 hours  hydrALAZINE 10 milliGRAM(s) Oral three times a day  isosorbide   dinitrate Tablet (ISORDIL) 5 milliGRAM(s) Oral three times a day  lisinopril 40 milliGRAM(s) Oral daily  simvastatin 20 milliGRAM(s) Oral at bedtime                            11.1   6.71  )-----------( 196      ( 16 Jun 2020 05:34 )             37.1       Hemoglobin: 11.1 g/dL (06-16 @ 05:34)  Hemoglobin: 11.4 g/dL (06-15 @ 06:33)  Hemoglobin: 11.1 g/dL (06-14 @ 06:15)  Hemoglobin: 11.4 g/dL (06-13 @ 06:30)  Hemoglobin: 11.3 g/dL (06-12 @ 06:55)      06-16    139  |  104  |  18  ----------------------------<  94  3.9   |  24  |  1.17    Ca    8.8      16 Jun 2020 05:34  Phos  3.8     06-16  Mg     2.2     06-16      Creatinine Trend: 1.17<--, 1.25<--, 1.23<--, 1.15<--, 1.20<--, 1.25<--    COAGS:     PHYSICAL EXAM:  Vital Signs last 24 Hours   T(C): 36.8 (06-16-20 @ 05:57), Max: 36.9 (06-15-20 @ 12:42)  HR: 62 (06-16-20 @ 05:57) (61 - 64)  BP: 129/76 (06-16-20 @ 05:57) (129/76 - 154/84)  RR: 18 (06-16-20 @ 05:57) (17 - 18)  SpO2: 100% (06-16-20 @ 05:57) (99% - 100%)  Wt(kg): --    I&O's Summary    15 Slade 2020 07:01  -  16 Jun 2020 07:00  --------------------------------------------------------  IN: 660 mL / OUT: 2600 mL / NET: -1940 mL    16 Jun 2020 07:01  -  16 Jun 2020 12:19  --------------------------------------------------------  IN: 0 mL / OUT: 400 mL / NET: -400 mL      Gen: Appears well in NAD  HEENT:  (-)icterus (-)pallor  CV: N S1 S2 1/6 SALLY (+)2 Pulses B/l, No JVD , or murmurs   Resp:  Clear to auscultation B/L, normal effort  GI: (+) BS Soft, NT, ND  Lymph:  (-)Edema, (-)obvious lymphadenopathy  Skin: Warm to touch, Normal turgor  Psych: Appropriate mood and affect    echo: LVEF 38%  cath report from [place] pending  cardiac MRI pending      A/P)  39 y/o male PMH HTN and "NICM" admitted with severe CHF. EP called for NSVT.    -diuresing well on IV diuretics   -continue coreg, lisinopril and isordil   -get cath report from [place] - pending, will try to obtain records today   -get cardiac MRI w/wo contrast r/o infiltrative cardiomyopathy or HOCM (pt claustrophobic may not be able to have test performed)  -cardiology f/u appreciated   -no indication for an ICD at this time  -f/u with Dr Eliezer Lyons after discharge on Tue June 23rd at 1pm

## 2020-06-16 NOTE — DIETITIAN INITIAL EVALUATION ADULT. - OTHER INFO
Initial Dietitian Evaluation 2/2 to extended length of stay. Patient is a 40 year old male with history of HFrEF, HTN a/w HF exacerbation. RDN met with patient at bedside. No reported difficulties chewing and swallowing.  No GI distress (nausea/vomiting/diarrhea/constipation) last BM 6/15. Pt reports good PO intake and appetite at this time as well as prior to admission. Patient reports a usual weight of ~ 320-340 lb and endorses annalisa gain 2/2 fluid retention. Admission weight is 159kg ( ~350 lb). Stated height 6'4".  Pt receptive to diet education and verbalizes some prior knowledge.  He was provided with Heart Healthy diet education (low sodium, low cholesterol, "good fats" vs "bad fats," fiber, label reading, meal planning, and daily weight monitoring). RD remains available, re-consult as needed.

## 2020-06-16 NOTE — DIETITIAN INITIAL EVALUATION ADULT. - PERTINENT MEDS FT
MEDICATIONS  (STANDING):  aspirin  chewable 81 milliGRAM(s) Oral daily  carvedilol 25 milliGRAM(s) Oral every 12 hours  furosemide   Injectable 80 milliGRAM(s) IV Push two times a day  heparin   Injectable 5000 Unit(s) SubCutaneous every 12 hours  hydrALAZINE 10 milliGRAM(s) Oral three times a day  isosorbide   dinitrate Tablet (ISORDIL) 5 milliGRAM(s) Oral three times a day  lisinopril 40 milliGRAM(s) Oral daily  simvastatin 20 milliGRAM(s) Oral at bedtime

## 2020-06-16 NOTE — DIETITIAN INITIAL EVALUATION ADULT. - PROBLEM SELECTOR PLAN 2
Never smoker
Initial MAP to 180, no neurologic complaints but presenting with heart failure and MUKESH. Pt given IV lasix with good urine output and s/p nitro gtt briefly with improvement in systolic bp to 197. Goal MAP around 135 (systolics around 180s)  -now off nitro gtt, will restart home coreg in AM (to have pt diurese more)  -hydralazine 10mg IV prn systolic bp >200  -continue IV lasix  -can add calcium channel blocker like nifedipine if needed  -repeat echo as above

## 2020-06-16 NOTE — DIETITIAN INITIAL EVALUATION ADULT. - PERTINENT LABORATORY DATA
06-16 Na139 mmol/L Glu 94 mg/dL K+ 3.9 mmol/L Cr  1.17 mg/dL BUN 18 mg/dL 06-16 Phos 3.8 mg/dL 06-11 Alb 3.7 g/dL 06-11 Chol 142 mg/dL  mg/dL HDL 36 mg/dL Trig 79 mg/dL

## 2020-06-16 NOTE — PROGRESS NOTE ADULT - SUBJECTIVE AND OBJECTIVE BOX
Subjective:  No chest pain; dyspnea and LE swelling improving; ROS otherwise negative.     aspirin  chewable 81 milliGRAM(s) Oral daily  carvedilol 25 milliGRAM(s) Oral every 12 hours  furosemide   Injectable 80 milliGRAM(s) IV Push two times a day  heparin   Injectable 5000 Unit(s) SubCutaneous every 12 hours  hydrALAZINE 10 milliGRAM(s) Oral three times a day  isosorbide   dinitrate Tablet (ISORDIL) 5 milliGRAM(s) Oral three times a day  lisinopril 40 milliGRAM(s) Oral daily  simvastatin 20 milliGRAM(s) Oral at bedtime                            11.1   6.71  )-----------( 196      ( 16 Jun 2020 05:34 )             37.1       06-16    139  |  104  |  18  ----------------------------<  94  3.9   |  24  |  1.17    Ca    8.8      16 Jun 2020 05:34  Phos  3.8     06-16  Mg     2.2     06-16              T(C): 36.4 (06-16-20 @ 13:43), Max: 36.8 (06-16-20 @ 05:57)  HR: 60 (06-16-20 @ 13:43) (60 - 64)  BP: 151/91 (06-16-20 @ 13:43) (129/76 - 154/84)  RR: 18 (06-16-20 @ 13:43) (17 - 18)  SpO2: 100% (06-16-20 @ 13:43) (99% - 100%)  Wt(kg): --    I&O's Summary    15 Slade 2020 07:01  -  16 Jun 2020 07:00  --------------------------------------------------------  IN: 660 mL / OUT: 2600 mL / NET: -1940 mL    16 Jun 2020 07:01  -  16 Jun 2020 15:39  --------------------------------------------------------  IN: 650 mL / OUT: 800 mL / NET: -150 mL        Appearance: Normal	  HEENT:   Normal oral mucosa, PERRL, EOMI	  Lymphatic: No lymphadenopathy , + edema in LE bilaterally   Cardiovascular: Normal S1 S2, No JVD, No murmurs , Peripheral pulses palpable 2+ bilaterally  Respiratory: Lungs clear to auscultation, normal effort 	  Gastrointestinal:  Soft, Non-tender, + BS	  Skin: No rashes, No ecchymoses, No cyanosis, warm to touch  Musculoskeletal: Normal range of motion, normal strength  Psychiatry:  Mood & affect appropriate    TELEMETRY:   SR, PVC's    DIAGNOSTIC TESTING:  < from: TTE with Doppler (w/Cont) (06.12.20 @ 08:33) >  1. Normal mitral valve. Minimal mitral regurgitation.  2. Severe concentric left ventricular hypertrophy.  3. Endocardium not well visualized; grossly moderate global  left ventricular systolic dysfunction.  Endocardial  visualization enhanced with intravenous injection of echo  contrast (Definity).  4. Severe diastolic dysfunction.  5. Unable to accurately evaluate right ventricular size or  systolic function.  *** No previous Echo exam.  < end of copied text >      ASSESSMENT/PLAN:           40 year old male with history of HFrEF (last TTE in Oct of 2019 demonstrated EF 40% and class III diastolic dysfunction), HTN who is being seen for management of heart failure.    -pt. still volume overloaded but volume status improving  -continue with IV diuresis to keep O >I, tolerating lasix to 80IV BID, Cr stable   -TTE noted   -obtain prior cath report-- LHC at [place] 4/2018 (per pt)   -cardiac MRI if patient can tolerate  -further workup pending above     Juan Pablo Guan MD

## 2020-06-16 NOTE — DIETITIAN INITIAL EVALUATION ADULT. - PROBLEM SELECTOR PLAN 5
DATE OF OPERATION:  12/06/18 - [facility]

 

DATE OF BIRTH:  [birth year]

 

SURGEON:  Nate Burgos MD

 

ASSISTANT:  ALICIA Batista

 

ANESTHESIOLOGIST:  Dr. Ross

 

ANESTHESIA:  Local MAC.

 

PRE-OP DIAGNOSIS:  Right index middle, ring, and small trigger fingers.

 

POST-OP DIAGNOSIS:  Right index middle, ring, and small trigger fingers.

 

OPERATIVE PROCEDURE:

1.  Release of right index trigger finger with release of A1 pulley.

2.  Release of right middle trigger finger with release of A1 pulley.

3.  Release of right ring trigger finger with release of A1 pulley.

4.  Release of right small trigger finger with release of A1 pulley.

 

INDICATIONS:  Christ has chronic trigger fingers in the right hand.  We talked 
about risks and benefits.  He had wanted to proceed with surgery.  He has had 
nonoperative treatment.

 

ESTIMATED BLOOD LOSS:  1 mL.

 

COMPLICATIONS:  None.

 

FINDINGS:  See above and below.

 

DESCRIPTION OF PROCEDURE:  Christ was seen in the preoperative holding area.  
The correct site, side, and procedure were identified.  We came back to the 
operating room.  We had a time-out.  I infiltrated the operative area with 0.25
% plain Marcaine.  The arm was then prepped and draped in the usual fashion and 
a time-out was performed.

 

The arm was exsanguinated with the Esmarch and the tourniquet inflated to 250 
mmHg. I made a transverse incision involving the distal palmar crease and 
extending radially over the index finger as well.  I then dissected out raising 
full- thickness flaps off of the tendon sheaths, but the index, middle ring, 
and small fingers neurovascular bundles were left undisturbed.  The palmar 
fascia was released in line with the incision.  Once I had exposure of the 
tendon sheath, I went ahead and first released the index finger A1 pulley along 
its radial third longitudinally in line with the tendons. The release was 
completed distally and proximally with the tenotomy scissors.  There was a 
quite a bit of tenosynovitis around the tendons and so this was excised as well.

 

I then came to the middle finger, and in a similar fashion, I released the A1 
pulley along the radial third longitudinally.  The release was completed 
distally and proximally with the tenotomy scissors.  The tenosynovitis was 
excised as well.

 

I then came to the ring finger and released the A1 pulley in same manner and 
excised the tenosynovitis.

 

I then came to the small finger and released the A1 pulley, again along the 
radial third and longitudinally and excised the tenosynovitis as well. I then 
checked each of the decompressions, everything was looking very good. I had 
Christ open and close the hand multiple times.  He could not induce any 
triggering in the hand, closed nicely into a full fist and opened up nicely.  I 
therefore irrigated out the wound.  Skin was closed with 4-0 nylon sutures.  
The soft dressings were applied and he was taken to the recovery room in stable 
condition.

 

 [id number]/CPS #: [id number]

LEX heparin subcu for dvt ppx, DASH diet

## 2020-06-17 DIAGNOSIS — D64.9 ANEMIA, UNSPECIFIED: ICD-10-CM

## 2020-06-17 LAB
ANION GAP SERPL CALC-SCNC: 12 MMO/L — SIGNIFICANT CHANGE UP (ref 7–14)
BUN SERPL-MCNC: 17 MG/DL — SIGNIFICANT CHANGE UP (ref 7–23)
CALCIUM SERPL-MCNC: 8.9 MG/DL — SIGNIFICANT CHANGE UP (ref 8.4–10.5)
CHLORIDE SERPL-SCNC: 103 MMOL/L — SIGNIFICANT CHANGE UP (ref 98–107)
CO2 SERPL-SCNC: 26 MMOL/L — SIGNIFICANT CHANGE UP (ref 22–31)
CREAT SERPL-MCNC: 1.29 MG/DL — SIGNIFICANT CHANGE UP (ref 0.5–1.3)
GLUCOSE SERPL-MCNC: 90 MG/DL — SIGNIFICANT CHANGE UP (ref 70–99)
HCT VFR BLD CALC: 38.1 % — LOW (ref 39–50)
HGB BLD-MCNC: 11.2 G/DL — LOW (ref 13–17)
MAGNESIUM SERPL-MCNC: 2.2 MG/DL — SIGNIFICANT CHANGE UP (ref 1.6–2.6)
MCHC RBC-ENTMCNC: 20.3 PG — LOW (ref 27–34)
MCHC RBC-ENTMCNC: 29.4 % — LOW (ref 32–36)
MCV RBC AUTO: 69.1 FL — LOW (ref 80–100)
NRBC # FLD: 0 K/UL — SIGNIFICANT CHANGE UP (ref 0–0)
PHOSPHATE SERPL-MCNC: 3.9 MG/DL — SIGNIFICANT CHANGE UP (ref 2.5–4.5)
PLATELET # BLD AUTO: 185 K/UL — SIGNIFICANT CHANGE UP (ref 150–400)
PMV BLD: SIGNIFICANT CHANGE UP FL (ref 7–13)
POTASSIUM SERPL-MCNC: 3.9 MMOL/L — SIGNIFICANT CHANGE UP (ref 3.5–5.3)
POTASSIUM SERPL-SCNC: 3.9 MMOL/L — SIGNIFICANT CHANGE UP (ref 3.5–5.3)
RBC # BLD: 5.51 M/UL — SIGNIFICANT CHANGE UP (ref 4.2–5.8)
RBC # FLD: 19.4 % — HIGH (ref 10.3–14.5)
SODIUM SERPL-SCNC: 141 MMOL/L — SIGNIFICANT CHANGE UP (ref 135–145)
WBC # BLD: 7.07 K/UL — SIGNIFICANT CHANGE UP (ref 3.8–10.5)
WBC # FLD AUTO: 7.07 K/UL — SIGNIFICANT CHANGE UP (ref 3.8–10.5)

## 2020-06-17 RX ADMIN — LISINOPRIL 40 MILLIGRAM(S): 2.5 TABLET ORAL at 06:25

## 2020-06-17 RX ADMIN — Medication 80 MILLIGRAM(S): at 19:10

## 2020-06-17 RX ADMIN — HEPARIN SODIUM 5000 UNIT(S): 5000 INJECTION INTRAVENOUS; SUBCUTANEOUS at 06:25

## 2020-06-17 RX ADMIN — ISOSORBIDE DINITRATE 5 MILLIGRAM(S): 5 TABLET ORAL at 14:40

## 2020-06-17 RX ADMIN — Medication 10 MILLIGRAM(S): at 06:25

## 2020-06-17 RX ADMIN — Medication 10 MILLIGRAM(S): at 21:53

## 2020-06-17 RX ADMIN — Medication 10 MILLIGRAM(S): at 14:40

## 2020-06-17 RX ADMIN — ISOSORBIDE DINITRATE 5 MILLIGRAM(S): 5 TABLET ORAL at 06:25

## 2020-06-17 RX ADMIN — Medication 81 MILLIGRAM(S): at 14:40

## 2020-06-17 RX ADMIN — CARVEDILOL PHOSPHATE 25 MILLIGRAM(S): 80 CAPSULE, EXTENDED RELEASE ORAL at 17:43

## 2020-06-17 RX ADMIN — HEPARIN SODIUM 5000 UNIT(S): 5000 INJECTION INTRAVENOUS; SUBCUTANEOUS at 17:12

## 2020-06-17 RX ADMIN — CARVEDILOL PHOSPHATE 25 MILLIGRAM(S): 80 CAPSULE, EXTENDED RELEASE ORAL at 06:25

## 2020-06-17 RX ADMIN — ISOSORBIDE DINITRATE 5 MILLIGRAM(S): 5 TABLET ORAL at 21:53

## 2020-06-17 RX ADMIN — SIMVASTATIN 20 MILLIGRAM(S): 20 TABLET, FILM COATED ORAL at 21:53

## 2020-06-17 RX ADMIN — Medication 80 MILLIGRAM(S): at 06:25

## 2020-06-17 NOTE — CONSULT NOTE ADULT - ASSESSMENT
41 y/o male with hx of systolic and diastolic heart failure, HTN who presents to the ED with progressive dyspnea and LE edema over the past 3-4 days. He has noted that he has started to have more intermittent shortness of breath over the last several weeks but were worse over the last few days. He had started to double his lasix dose to try to help with the swelling with no improvment. He also noted difficulty breathing laying down as well as a cough since yday. No fever, chills, chest pain, abdominal pain or dysuria/diarrhea. He notes that his usual BP ranges around 160s systolic and his meds havent changed. He hasn't seen a cardiologist in a while due to COVID but notes his diet is low salt. No significant weight gain noted. He was found in hypertensive urgency to 250s systolic, briefly on nitro gtt and given lasix, hydralazine .

## 2020-06-17 NOTE — PROGRESS NOTE ADULT - SUBJECTIVE AND OBJECTIVE BOX
Subjective:  No chest pain; dyspnea and LE swelling improving; ROS otherwise negative.     MEDICATIONS  (STANDING):  aspirin  chewable 81 milliGRAM(s) Oral daily  carvedilol 25 milliGRAM(s) Oral every 12 hours  furosemide   Injectable 80 milliGRAM(s) IV Push two times a day  heparin   Injectable 5000 Unit(s) SubCutaneous every 12 hours  hydrALAZINE 10 milliGRAM(s) Oral three times a day  isosorbide   dinitrate Tablet (ISORDIL) 5 milliGRAM(s) Oral three times a day  lisinopril 40 milliGRAM(s) Oral daily  simvastatin 20 milliGRAM(s) Oral at bedtime    MEDICATIONS  (PRN):      LABS:                      11.2   7.07  )-----------( 185      ( 17 Jun 2020 06:30 )             38.1     141  |  103  |  17  ----------------------------<  90  3.9   |  26  |  1.29    Ca    8.9      17 Jun 2020 06:30  Phos  3.9     06-17  Mg     2.2     06-17    Creatinine Trend: 1.29<--, 1.17<--, 1.25<--, 1.23<--, 1.15<--, 1.20<--    PHYSICAL EXAM  Vital Signs Last 24 Hrs  T(C): 36.7 (17 Jun 2020 14:15), Max: 36.8 (16 Jun 2020 18:00)  T(F): 98.1 (17 Jun 2020 14:15), Max: 98.2 (16 Jun 2020 18:00)  HR: 64 (17 Jun 2020 14:15) (52 - 69)  BP: 176/93 (17 Jun 2020 14:15) (128/70 - 176/93)  RR: 16 (17 Jun 2020 14:15) (16 - 20)  SpO2: 100% (17 Jun 2020 14:15) (100% - 100%)    Appearance: Normal	  HEENT:   Normal oral mucosa, PERRL, EOMI	  Lymphatic: No lymphadenopathy , + edema in LE bilaterally   Cardiovascular: Normal S1 S2, No JVD, No murmurs , Peripheral pulses palpable 2+ bilaterally  Respiratory: Lungs clear to auscultation, normal effort 	  Gastrointestinal:  Soft, Non-tender, + BS	  Skin: No rashes, No ecchymoses, No cyanosis, warm to touch  Musculoskeletal: Normal range of motion, normal strength  Psychiatry:  Mood & affect appropriate    TELEMETRY:   SR, PVC's    DIAGNOSTIC TESTING:  < from: TTE with Doppler (w/Cont) (06.12.20 @ 08:33) >  1. Normal mitral valve. Minimal mitral regurgitation.  2. Severe concentric left ventricular hypertrophy.  3. Endocardium not well visualized; grossly moderate global  left ventricular systolic dysfunction.  Endocardial  visualization enhanced with intravenous injection of echo  contrast (Definity).  4. Severe diastolic dysfunction.  5. Unable to accurately evaluate right ventricular size or  systolic function.  *** No previous Echo exam.  < end of copied text >      ASSESSMENT/PLAN:           40 year old male with history of HFrEF (last TTE in Oct of 2019 demonstrated EF 40% and class III diastolic dysfunction), HTN who is being seen for management of heart failure.    -pt. still volume overloaded but volume status improving  -continue with IV diuresis to keep O >I, tolerating lasix to 80IV BID  -TTE noted   -I obtained [facility] [place]--will review report  -cardiac MRI as OP  -further workup pending above

## 2020-06-17 NOTE — CONSULT NOTE ADULT - SUBJECTIVE AND OBJECTIVE BOX
Patient is a 40y old  Male who presents with a chief complaint of shortness of breath and edema      HPI:   39 y/o male with hx of systolic and diastolic heart failure, HTN who presents to the ED with progressive dyspnea and LE edema over the past 3-4 days. He has noted that he has started to have more intermittent shortness of breath over the last several weeks but were worse over the last few days. He had started to double his lasix dose to try to help with the swelling with no improvment. He also noted difficulty breathing laying down as well as a cough since yday. No fever, chills, chest pain, abdominal pain or dysuria/diarrhea. He notes that his usual BP ranges around 160s systolic and his meds havent changed. He hasn't seen a cardiologist in a while due to COVID but notes his diet is low salt. No significant weight gain noted. He was found in hypertensive urgency to 250s systolic, briefly on nitro gtt and given lasix, hydralazine .      PAST MEDICAL & SURGICAL HISTORY:  HTN (hypertension)  Congestive heart failure  No significant past surgical history      Social History: No smoking ,alcohol or drugs     FAMILY HISTORY:  No pertinent family history in first degree relatives      Allergies    No Known Allergies    Intolerances        REVIEW OF SYSTEMS:    CONSTITUTIONAL: No fever, weight loss, or fatigue  EYES: No eye pain, visual disturbances, or discharge  RESPIRATORY: No cough, wheezing, chills or hemoptysis; No shortness of breath  CARDIOVASCULAR: No chest pain, palpitations, dizziness, or leg swelling  GASTROINTESTINAL: No abdominal or epigastric pain. No nausea, vomiting, or hematemesis; No diarrhea or constipation. No melena or hematochezia.  GENITOURINARY: No dysuria, frequency, hematuria, or incontinence  NEUROLOGICAL: No headaches, memory loss, loss of strength, numbness, or tremors  SKIN: No itching, burning, rashes, or lesions   ENDOCRINE: No heat or cold intolerance; No hair loss  MUSCULOSKELETAL: No joint pain or swelling; No muscle, back, or extremity pain  PSYCHIATRIC: No depression, anxiety, mood swings, or difficulty sleeping      MEDICATIONS  (STANDING):  aspirin  chewable 81 milliGRAM(s) Oral daily  carvedilol 25 milliGRAM(s) Oral every 12 hours  furosemide   Injectable 80 milliGRAM(s) IV Push two times a day  heparin   Injectable 5000 Unit(s) SubCutaneous every 12 hours  hydrALAZINE 10 milliGRAM(s) Oral three times a day  isosorbide   dinitrate Tablet (ISORDIL) 5 milliGRAM(s) Oral three times a day  lisinopril 40 milliGRAM(s) Oral daily  simvastatin 20 milliGRAM(s) Oral at bedtime    MEDICATIONS  (PRN):      Vital Signs Last 24 Hrs  T(C): 36.5 (17 Jun 2020 17:08), Max: 36.7 (17 Jun 2020 07:02)  T(F): 97.7 (17 Jun 2020 17:08), Max: 98.1 (17 Jun 2020 14:15)  HR: 52 (17 Jun 2020 17:08) (52 - 69)  BP: 161/98 (17 Jun 2020 17:08) (128/70 - 176/93)  BP(mean): --  RR: 16 (17 Jun 2020 17:08) (16 - 18)  SpO2: 100% (17 Jun 2020 17:08) (100% - 100%)    PHYSICAL EXAM:    GENERAL: NAD, well-groomed, well-developed  HEAD:  Atraumatic, Normocephalic  EYES: EOMI, PERRLA, conjunctiva and sclera clear  ENMT: No tonsillar erythema, exudates, or enlargement; Moist mucous membranes, Good dentition, No lesions  NECK: Supple, No JVD, Normal thyroid  NERVOUS SYSTEM:  Alert & Oriented X3, No focal sign   CHEST/LUNG: Clear to percussion bilaterally; No rales, rhonchi, wheezing, or rubs  HEART: Regular rate and rhythm; No murmurs, rubs, or gallops  ABDOMEN: Soft, Nontender, Nondistended; Bowel sounds present  EXTREMITIES:  2+ edema      LABS:                        11.2   7.07  )-----------( 185      ( 17 Jun 2020 06:30 )             38.1     06-17    141  |  103  |  17  ----------------------------<  90  3.9   |  26  |  1.29    Ca    8.9      17 Jun 2020 06:30  Phos  3.9     06-17  Mg     2.2     06-17              RADIOLOGY & ADDITIONAL STUDIES:

## 2020-06-17 NOTE — PROVIDER CONTACT NOTE (OTHER) - SITUATION
2.07 second pause noted on telemetry. Sinus bradycardia noted on telemetry with HR 59 bpm. Patient remained asymptomatic during ectopy.

## 2020-06-17 NOTE — PROGRESS NOTE ADULT - SUBJECTIVE AND OBJECTIVE BOX
EP ATTENDING    tele: NSR, no events    he denies palpitations, syncope, nor angina, ROS otherwise -, dyspnea improving    aspirin  chewable 81 milliGRAM(s) Oral daily  carvedilol 25 milliGRAM(s) Oral every 12 hours  furosemide   Injectable 80 milliGRAM(s) IV Push two times a day  heparin   Injectable 5000 Unit(s) SubCutaneous every 12 hours  hydrALAZINE 10 milliGRAM(s) Oral three times a day  isosorbide   dinitrate Tablet (ISORDIL) 5 milliGRAM(s) Oral three times a day  lisinopril 40 milliGRAM(s) Oral daily  simvastatin 20 milliGRAM(s) Oral at bedtime                            11.2   7.07  )-----------( 185      ( 17 Jun 2020 06:30 )             38.1       06-17    141  |  103  |  17  ----------------------------<  90  3.9   |  26  |  1.29    Ca    8.9      17 Jun 2020 06:30  Phos  3.9     06-17  Mg     2.2     06-17        T(C): 36.7 (06-17-20 @ 07:02), Max: 36.8 (06-16-20 @ 18:00)  HR: 59 (06-17-20 @ 07:02) (52 - 69)  BP: 133/78 (06-17-20 @ 07:02) (128/70 - 164/88)  RR: 16 (06-17-20 @ 07:02) (16 - 20)  SpO2: 100% (06-17-20 @ 07:02) (100% - 100%)  Wt(kg): --    I&O's Summary    16 Jun 2020 07:01  -  17 Jun 2020 07:00  --------------------------------------------------------  IN: 1100 mL / OUT: 3000 mL / NET: -1900 mL        A&O x 3  neurologically intact  JVP 10  RRR, no murmurs  CTAB  soft nt/nd  no c/c/e    echo: LVEF 38%  cath report from [facility] pending  cardiac MRI pending      A/P) 39 y/o male PMH HTN and "NICM" admitted with severe CHF. EP called for NSVT. LVEF is 38%.    -continue IV diuresis  -continue coreg, lisinopril and bidil  -get cath report from [facility]  -get cardiac MRI w/wo contrast r/o infiltrative cardiomyopathy or HOCM (can be done as outpatient)  -no indication for an ICD at this time  -f/u with Dr Eliezer Lyons after discharge on Tue June 23rd at 1pm  -no further inpatient EP workup expected      Roberto Cintron M.D., [facility] [[email or web address]]  Cardiac Electrophysiology  [phone]

## 2020-06-17 NOTE — CONSULT NOTE ADULT - PROBLEM SELECTOR RECOMMENDATION 9
IV Lasix . Cardiology and EP helping. Awaiting further work up. Cardiac MRI oupt.   Has Sexual side effect with Coreg.

## 2020-06-18 LAB
ANION GAP SERPL CALC-SCNC: 14 MMO/L — SIGNIFICANT CHANGE UP (ref 7–14)
BUN SERPL-MCNC: 21 MG/DL — SIGNIFICANT CHANGE UP (ref 7–23)
CALCIUM SERPL-MCNC: 9.3 MG/DL — SIGNIFICANT CHANGE UP (ref 8.4–10.5)
CHLORIDE SERPL-SCNC: 104 MMOL/L — SIGNIFICANT CHANGE UP (ref 98–107)
CO2 SERPL-SCNC: 24 MMOL/L — SIGNIFICANT CHANGE UP (ref 22–31)
CREAT SERPL-MCNC: 1.3 MG/DL — SIGNIFICANT CHANGE UP (ref 0.5–1.3)
FERRITIN SERPL-MCNC: 62.89 NG/ML — SIGNIFICANT CHANGE UP (ref 30–400)
GLUCOSE SERPL-MCNC: 91 MG/DL — SIGNIFICANT CHANGE UP (ref 70–99)
HCT VFR BLD CALC: 38.9 % — LOW (ref 39–50)
HGB BLD-MCNC: 11.5 G/DL — LOW (ref 13–17)
IRON SATN MFR SERPL: 31 UG/DL — LOW (ref 45–165)
IRON SATN MFR SERPL: 400 UG/DL — SIGNIFICANT CHANGE UP (ref 155–535)
MAGNESIUM SERPL-MCNC: 2.2 MG/DL — SIGNIFICANT CHANGE UP (ref 1.6–2.6)
MCHC RBC-ENTMCNC: 20.6 PG — LOW (ref 27–34)
MCHC RBC-ENTMCNC: 29.6 % — LOW (ref 32–36)
MCV RBC AUTO: 69.7 FL — LOW (ref 80–100)
NRBC # FLD: 0 K/UL — SIGNIFICANT CHANGE UP (ref 0–0)
OB PNL STL: NEGATIVE — SIGNIFICANT CHANGE UP
PHOSPHATE SERPL-MCNC: 4 MG/DL — SIGNIFICANT CHANGE UP (ref 2.5–4.5)
PLATELET # BLD AUTO: 202 K/UL — SIGNIFICANT CHANGE UP (ref 150–400)
PMV BLD: SIGNIFICANT CHANGE UP FL (ref 7–13)
POTASSIUM SERPL-MCNC: 4 MMOL/L — SIGNIFICANT CHANGE UP (ref 3.5–5.3)
POTASSIUM SERPL-SCNC: 4 MMOL/L — SIGNIFICANT CHANGE UP (ref 3.5–5.3)
RBC # BLD: 5.58 M/UL — SIGNIFICANT CHANGE UP (ref 4.2–5.8)
RBC # FLD: 19.2 % — HIGH (ref 10.3–14.5)
SODIUM SERPL-SCNC: 142 MMOL/L — SIGNIFICANT CHANGE UP (ref 135–145)
T4 FREE SERPL-MCNC: 1.15 NG/DL — SIGNIFICANT CHANGE UP (ref 0.9–1.8)
TSH SERPL-MCNC: 1.95 UIU/ML — SIGNIFICANT CHANGE UP (ref 0.27–4.2)
UIBC SERPL-MCNC: 369.4 UG/DL — SIGNIFICANT CHANGE UP (ref 110–370)
WBC # BLD: 6.86 K/UL — SIGNIFICANT CHANGE UP (ref 3.8–10.5)
WBC # FLD AUTO: 6.86 K/UL — SIGNIFICANT CHANGE UP (ref 3.8–10.5)

## 2020-06-18 RX ADMIN — Medication 80 MILLIGRAM(S): at 06:44

## 2020-06-18 RX ADMIN — SIMVASTATIN 20 MILLIGRAM(S): 20 TABLET, FILM COATED ORAL at 21:38

## 2020-06-18 RX ADMIN — Medication 80 MILLIGRAM(S): at 18:02

## 2020-06-18 RX ADMIN — Medication 81 MILLIGRAM(S): at 12:29

## 2020-06-18 RX ADMIN — HEPARIN SODIUM 5000 UNIT(S): 5000 INJECTION INTRAVENOUS; SUBCUTANEOUS at 06:45

## 2020-06-18 RX ADMIN — CARVEDILOL PHOSPHATE 25 MILLIGRAM(S): 80 CAPSULE, EXTENDED RELEASE ORAL at 18:03

## 2020-06-18 RX ADMIN — Medication 10 MILLIGRAM(S): at 06:45

## 2020-06-18 RX ADMIN — Medication 10 MILLIGRAM(S): at 21:38

## 2020-06-18 RX ADMIN — Medication 10 MILLIGRAM(S): at 12:29

## 2020-06-18 RX ADMIN — CARVEDILOL PHOSPHATE 25 MILLIGRAM(S): 80 CAPSULE, EXTENDED RELEASE ORAL at 06:44

## 2020-06-18 RX ADMIN — HEPARIN SODIUM 5000 UNIT(S): 5000 INJECTION INTRAVENOUS; SUBCUTANEOUS at 18:03

## 2020-06-18 RX ADMIN — LISINOPRIL 40 MILLIGRAM(S): 2.5 TABLET ORAL at 06:45

## 2020-06-18 RX ADMIN — ISOSORBIDE DINITRATE 5 MILLIGRAM(S): 5 TABLET ORAL at 06:45

## 2020-06-18 RX ADMIN — ISOSORBIDE DINITRATE 5 MILLIGRAM(S): 5 TABLET ORAL at 12:29

## 2020-06-18 RX ADMIN — ISOSORBIDE DINITRATE 5 MILLIGRAM(S): 5 TABLET ORAL at 21:38

## 2020-06-18 NOTE — PROGRESS NOTE ADULT - SUBJECTIVE AND OBJECTIVE BOX
INTERVAL HPI/OVERNIGHT EVENTS: no new concerns . I feel better as can see my ankles again.   Vital Signs Last 24 Hrs  T(C): 37.1 (18 Jun 2020 12:30), Max: 37.1 (18 Jun 2020 12:30)  T(F): 98.8 (18 Jun 2020 12:30), Max: 98.8 (18 Jun 2020 12:30)  HR: 52 (18 Jun 2020 12:30) (52 - 64)  BP: 173/107 (18 Jun 2020 12:30) (148/89 - 176/93)  BP(mean): --  RR: 16 (18 Jun 2020 12:30) (16 - 16)  SpO2: 99% (18 Jun 2020 06:40) (99% - 100%)  I&O's Summary    17 Jun 2020 07:01  -  18 Jun 2020 07:00  --------------------------------------------------------  IN: 1225 mL / OUT: 4750 mL / NET: -3525 mL    18 Jun 2020 07:01  -  18 Jun 2020 13:25  --------------------------------------------------------  IN: 0 mL / OUT: 1800 mL / NET: -1800 mL      MEDICATIONS  (STANDING):  aspirin  chewable 81 milliGRAM(s) Oral daily  carvedilol 25 milliGRAM(s) Oral every 12 hours  furosemide   Injectable 80 milliGRAM(s) IV Push two times a day  heparin   Injectable 5000 Unit(s) SubCutaneous every 12 hours  hydrALAZINE 10 milliGRAM(s) Oral three times a day  isosorbide   dinitrate Tablet (ISORDIL) 5 milliGRAM(s) Oral three times a day  lisinopril 40 milliGRAM(s) Oral daily  simvastatin 20 milliGRAM(s) Oral at bedtime    MEDICATIONS  (PRN):    LABS:                        11.5   6.86  )-----------( 202      ( 18 Jun 2020 05:30 )             38.9     06-18    142  |  104  |  21  ----------------------------<  91  4.0   |  24  |  1.30    Ca    9.3      18 Jun 2020 05:30  Phos  4.0     06-18  Mg     2.2     06-18          CAPILLARY BLOOD GLUCOSE              REVIEW OF SYSTEMS:  CONSTITUTIONAL: No fever, weight loss, or fatigue  EYES: No eye pain, visual disturbances, or discharge  ENMT:  No difficulty hearing, tinnitus, vertigo; No sinus or throat pain  NECK: No pain or stiffness  RESPIRATORY: No cough, wheezing, chills or hemoptysis; No shortness of breath  CARDIOVASCULAR: No chest pain, palpitations, dizziness, or leg swelling  GASTROINTESTINAL: No abdominal or epigastric pain. No nausea, vomiting, or hematemesis; No diarrhea or constipation. No melena or hematochezia.  GENITOURINARY: No dysuria, frequency, hematuria, or incontinence  NEUROLOGICAL: No headaches, memory loss, loss of strength, numbness, or tremors      Consultant(s) Notes Reviewed:  [x ] YES  [ ] NO    PHYSICAL EXAM:  GENERAL: NAD, well-groomed, well-developed,not in any distress ,  HEAD:  Atraumatic, Normocephalic  EYES: EOMI, PERRLA, conjunctiva and sclera clear  ENMT: No tonsillar erythema, exudates, or enlargement; Moist mucous membranes, Good dentition, No lesions  NECK: Supple, No JVD, Normal thyroid  NERVOUS SYSTEM:  Alert & Oriented X3, No focal deficit   CHEST/LUNG: Good air entry bilateral with no  rales, rhonchi, wheezing, or rubs  HEART: Regular rate and rhythm; No murmurs, rubs, or gallops  ABDOMEN: Soft, Nontender, Nondistended; Bowel sounds present  EXTREMITIES:  2+ Peripheral Pulses, No clubbing, cyanosis, or edema  SKIN: No rashes or lesions    Care Discussed with Consultants/Other Providers [ x] YES  [ ] NO

## 2020-06-18 NOTE — PROGRESS NOTE ADULT - SUBJECTIVE AND OBJECTIVE BOX
S:  patient denies palpitations, syncope, angina. Review of systems otherwise negative.     tele: NSR, no events      aspirin  chewable 81 milliGRAM(s) Oral daily  carvedilol 25 milliGRAM(s) Oral every 12 hours  furosemide   Injectable 80 milliGRAM(s) IV Push two times a day  heparin   Injectable 5000 Unit(s) SubCutaneous every 12 hours  hydrALAZINE 10 milliGRAM(s) Oral three times a day  isosorbide   dinitrate Tablet (ISORDIL) 5 milliGRAM(s) Oral three times a day  lisinopril 40 milliGRAM(s) Oral daily  simvastatin 20 milliGRAM(s) Oral at bedtime                        11.5   6.86  )-----------( 202      ( 18 Jun 2020 05:30 )             38.9     Hemoglobin: 11.5 g/dL (06-18 @ 05:30)  Hemoglobin: 11.2 g/dL (06-17 @ 06:30)  Hemoglobin: 11.1 g/dL (06-16 @ 05:34)  Hemoglobin: 11.4 g/dL (06-15 @ 06:33)  Hemoglobin: 11.1 g/dL (06-14 @ 06:15)      06-18    142  |  104  |  21  ----------------------------<  91  4.0   |  24  |  1.30    Ca    9.3      18 Jun 2020 05:30  Phos  4.0     06-18  Mg     2.2     06-18      Creatinine Trend: 1.30<--, 1.29<--, 1.17<--, 1.25<--, 1.23<--, 1.15<--    COAGS:         PHYSICAL EXAM:  Vital Signs last 24 Hours   T(C): 36.6 (06-18-20 @ 06:40), Max: 36.7 (06-17-20 @ 14:15)  HR: 61 (06-18-20 @ 06:40) (52 - 64)  BP: 148/96 (06-18-20 @ 06:40) (148/89 - 176/93)  RR: 16 (06-18-20 @ 06:40) (16 - 16)  SpO2: 99% (06-18-20 @ 06:40) (99% - 100%)  Wt(kg): --    I&O's Summary    17 Jun 2020 07:01  -  18 Jun 2020 07:00  --------------------------------------------------------  IN: 1225 mL / OUT: 4750 mL / NET: -3525 mL        A&O x 3  neurologically intact  JVP 10  RRR, no murmurs  CTAB  soft nt/nd  no c/c/e    < from: TTE with Doppler (w/Cont) (06.12.20 @ 08:33) >  CONCLUSIONS:  1. Normal mitral valve. Minimal mitral regurgitation.  2. Severe concentric left ventricular hypertrophy.  3. Endocardium not well visualized; grossly moderate global  left ventricular systolic dysfunction.  Endocardial  visualization enhanced with intravenous injection of echo  contrast (Definity).  4. Severe diastolic dysfunction.  5. Unable to accurately evaluate right ventricular size or  systolic function.  *** No previous Echo exam.  ------------------------------------------------------------------------  Confirmed on  6/12/2020 - 13:58:21 by Moises Menon M.D.    < end of copied text >      A/P)  41 y/o male PMH HTN and "NICM" admitted with severe CHF. EP called for NSVT. LVEF is 38%.      -continue IV diuresis, diuresing well   -continue coreg, lisinopril and bidil   -cath report from [place] reviewed, f/u cards for further work up   -get cardiac MRI w/wo contrast r/o infiltrative cardiomyopathy or HOCM (can be done as outpatient)  -no indication for an ICD at this time  -f/u with Dr Eliezer Lyons after discharge on Tue June 23rd at 1pm  -no further inpatient EP workup expected

## 2020-06-18 NOTE — PROGRESS NOTE ADULT - SUBJECTIVE AND OBJECTIVE BOX
Subjective:  No chest pain; dyspnea and LE swelling improving; ROS otherwise negative.       MEDICATIONS  (STANDING):  aspirin  chewable 81 milliGRAM(s) Oral daily  carvedilol 25 milliGRAM(s) Oral every 12 hours  furosemide   Injectable 80 milliGRAM(s) IV Push two times a day  heparin   Injectable 5000 Unit(s) SubCutaneous every 12 hours  hydrALAZINE 10 milliGRAM(s) Oral three times a day  isosorbide   dinitrate Tablet (ISORDIL) 5 milliGRAM(s) Oral three times a day  lisinopril 40 milliGRAM(s) Oral daily  simvastatin 20 milliGRAM(s) Oral at bedtime    MEDICATIONS  (PRN):      LABS:                            11.5   6.86  )-----------( 202      ( 18 Jun 2020 05:30 )             38.9     142  |  104  |  21  ----------------------------<  91  4.0   |  24  |  1.30    Ca    9.3      18 Jun 2020 05:30  Phos  4.0     06-18  Mg     2.2     06-18    Creatinine Trend: 1.30<--, 1.29<--, 1.17<--, 1.25<--, 1.23<--, 1.15<--     PHYSICAL EXAM  Vital Signs Last 24 Hrs  T(C): 36.6 (18 Jun 2020 06:40), Max: 36.7 (17 Jun 2020 14:15)  T(F): 97.9 (18 Jun 2020 06:40), Max: 98.1 (17 Jun 2020 14:15)  HR: 61 (18 Jun 2020 06:40) (52 - 64)  BP: 148/96 (18 Jun 2020 06:40) (148/89 - 176/93)  RR: 16 (18 Jun 2020 06:40) (16 - 16)  SpO2: 99% (18 Jun 2020 06:40) (99% - 100%)    Appearance: Normal	  HEENT:   Normal oral mucosa, PERRL, EOMI	  Lymphatic: No lymphadenopathy , + edema in LE bilaterally   Cardiovascular: Normal S1 S2, No JVD, No murmurs , Peripheral pulses palpable 2+ bilaterally  Respiratory: Lungs clear to auscultation, normal effort 	  Gastrointestinal:  Soft, Non-tender, + BS	  Skin: No rashes, No ecchymoses, No cyanosis, warm to touch  Musculoskeletal: Normal range of motion, normal strength  Psychiatry:  Mood & affect appropriate    TELEMETRY:   SR, PVC's    DIAGNOSTIC TESTING:  < from: TTE with Doppler (w/Cont) (06.12.20 @ 08:33) >  1. Normal mitral valve. Minimal mitral regurgitation.  2. Severe concentric left ventricular hypertrophy.  3. Endocardium not well visualized; grossly moderate global  left ventricular systolic dysfunction.  Endocardial  visualization enhanced with intravenous injection of echo  contrast (Definity).  4. Severe diastolic dysfunction.  5. Unable to accurately evaluate right ventricular size or  systolic function.  *** No previous Echo exam.  < end of copied text >      ASSESSMENT/PLAN:           40 year old male with history of HFrEF (last TTE in Oct of 2019 demonstrated EF 40% and class III diastolic dysfunction), HTN who is being seen for management of heart failure.    -pt. still volume overloaded but volume status improving  -continue with IV diuresis to keep O >I, tolerating lasix to 80IV BID  -TTE noted above, LVEF 38%, severe DD  -I obtained [facility] [place]--Pt with [facility] 1/2019 with normal LV function and non obs CAD  -cardiac MRI as OP  -pt reports prior LVEF to be 40% with Dr Heard at [facility] cardio clinic, reports unavailable

## 2020-06-19 LAB
ANION GAP SERPL CALC-SCNC: 12 MMO/L — SIGNIFICANT CHANGE UP (ref 7–14)
BUN SERPL-MCNC: 21 MG/DL — SIGNIFICANT CHANGE UP (ref 7–23)
CALCIUM SERPL-MCNC: 9.1 MG/DL — SIGNIFICANT CHANGE UP (ref 8.4–10.5)
CHLORIDE SERPL-SCNC: 104 MMOL/L — SIGNIFICANT CHANGE UP (ref 98–107)
CO2 SERPL-SCNC: 23 MMOL/L — SIGNIFICANT CHANGE UP (ref 22–31)
CREAT SERPL-MCNC: 1.28 MG/DL — SIGNIFICANT CHANGE UP (ref 0.5–1.3)
GLUCOSE SERPL-MCNC: 101 MG/DL — HIGH (ref 70–99)
HCT VFR BLD CALC: 39.3 % — SIGNIFICANT CHANGE UP (ref 39–50)
HGB BLD-MCNC: 11.8 G/DL — LOW (ref 13–17)
MAGNESIUM SERPL-MCNC: 2.2 MG/DL — SIGNIFICANT CHANGE UP (ref 1.6–2.6)
MCHC RBC-ENTMCNC: 20.7 PG — LOW (ref 27–34)
MCHC RBC-ENTMCNC: 30 % — LOW (ref 32–36)
MCV RBC AUTO: 69.1 FL — LOW (ref 80–100)
NRBC # FLD: 0 K/UL — SIGNIFICANT CHANGE UP (ref 0–0)
PHOSPHATE SERPL-MCNC: 4 MG/DL — SIGNIFICANT CHANGE UP (ref 2.5–4.5)
PLATELET # BLD AUTO: 177 K/UL — SIGNIFICANT CHANGE UP (ref 150–400)
PMV BLD: SIGNIFICANT CHANGE UP FL (ref 7–13)
POTASSIUM SERPL-MCNC: 3.9 MMOL/L — SIGNIFICANT CHANGE UP (ref 3.5–5.3)
POTASSIUM SERPL-SCNC: 3.9 MMOL/L — SIGNIFICANT CHANGE UP (ref 3.5–5.3)
RBC # BLD: 5.69 M/UL — SIGNIFICANT CHANGE UP (ref 4.2–5.8)
RBC # FLD: 19.1 % — HIGH (ref 10.3–14.5)
SARS-COV-2 RNA SPEC QL NAA+PROBE: SIGNIFICANT CHANGE UP
SODIUM SERPL-SCNC: 139 MMOL/L — SIGNIFICANT CHANGE UP (ref 135–145)
TESTOST FREE+TOTAL PANEL SERPL-MCNC: 234 NG/DL — LOW (ref 249–836)
WBC # BLD: 6.58 K/UL — SIGNIFICANT CHANGE UP (ref 3.8–10.5)
WBC # FLD AUTO: 6.58 K/UL — SIGNIFICANT CHANGE UP (ref 3.8–10.5)

## 2020-06-19 RX ORDER — TRAMADOL HYDROCHLORIDE 50 MG/1
25 TABLET ORAL ONCE
Refills: 0 | Status: DISCONTINUED | OUTPATIENT
Start: 2020-06-19 | End: 2020-06-19

## 2020-06-19 RX ADMIN — Medication 10 MILLIGRAM(S): at 06:41

## 2020-06-19 RX ADMIN — ISOSORBIDE DINITRATE 5 MILLIGRAM(S): 5 TABLET ORAL at 06:41

## 2020-06-19 RX ADMIN — HEPARIN SODIUM 5000 UNIT(S): 5000 INJECTION INTRAVENOUS; SUBCUTANEOUS at 06:40

## 2020-06-19 RX ADMIN — Medication 81 MILLIGRAM(S): at 12:05

## 2020-06-19 RX ADMIN — LISINOPRIL 40 MILLIGRAM(S): 2.5 TABLET ORAL at 06:41

## 2020-06-19 RX ADMIN — CARVEDILOL PHOSPHATE 25 MILLIGRAM(S): 80 CAPSULE, EXTENDED RELEASE ORAL at 18:34

## 2020-06-19 RX ADMIN — SIMVASTATIN 20 MILLIGRAM(S): 20 TABLET, FILM COATED ORAL at 22:51

## 2020-06-19 RX ADMIN — TRAMADOL HYDROCHLORIDE 25 MILLIGRAM(S): 50 TABLET ORAL at 23:15

## 2020-06-19 RX ADMIN — Medication 80 MILLIGRAM(S): at 18:34

## 2020-06-19 RX ADMIN — ISOSORBIDE DINITRATE 5 MILLIGRAM(S): 5 TABLET ORAL at 22:51

## 2020-06-19 RX ADMIN — HEPARIN SODIUM 5000 UNIT(S): 5000 INJECTION INTRAVENOUS; SUBCUTANEOUS at 18:35

## 2020-06-19 RX ADMIN — Medication 10 MILLIGRAM(S): at 22:51

## 2020-06-19 RX ADMIN — CARVEDILOL PHOSPHATE 25 MILLIGRAM(S): 80 CAPSULE, EXTENDED RELEASE ORAL at 06:40

## 2020-06-19 RX ADMIN — Medication 80 MILLIGRAM(S): at 06:41

## 2020-06-19 NOTE — PROGRESS NOTE ADULT - SUBJECTIVE AND OBJECTIVE BOX
S:  patient denies palpitations, syncope, angina. Review of systems otherwise negative.     tele: NSR, no events        aspirin  chewable 81 milliGRAM(s) Oral daily  carvedilol 25 milliGRAM(s) Oral every 12 hours  furosemide   Injectable 80 milliGRAM(s) IV Push two times a day  heparin   Injectable 5000 Unit(s) SubCutaneous every 12 hours  hydrALAZINE 10 milliGRAM(s) Oral three times a day  isosorbide   dinitrate Tablet (ISORDIL) 5 milliGRAM(s) Oral three times a day  lisinopril 40 milliGRAM(s) Oral daily  simvastatin 20 milliGRAM(s) Oral at bedtime                            11.8   6.58  )-----------( 177      ( 19 Jun 2020 06:25 )             39.3       Hemoglobin: 11.8 g/dL (06-19 @ 06:25)  Hemoglobin: 11.5 g/dL (06-18 @ 05:30)  Hemoglobin: 11.2 g/dL (06-17 @ 06:30)  Hemoglobin: 11.1 g/dL (06-16 @ 05:34)  Hemoglobin: 11.4 g/dL (06-15 @ 06:33)      06-19    139  |  104  |  21  ----------------------------<  101<H>  3.9   |  23  |  1.28    Ca    9.1      19 Jun 2020 06:25  Phos  4.0     06-19  Mg     2.2     06-19      Creatinine Trend: 1.28<--, 1.30<--, 1.29<--, 1.17<--, 1.25<--, 1.23<--    COAGS:           PHYSICAL EXAM:  Vital Signs last 24 Hours   T(C): 36.7 (06-19-20 @ 06:33), Max: 37.1 (06-18-20 @ 12:30)  HR: 55 (06-19-20 @ 06:33) (52 - 58)  BP: 141/96 (06-19-20 @ 06:33) (141/96 - 173/107)  RR: 16 (06-19-20 @ 06:33) (16 - 16)  SpO2: 100% (06-19-20 @ 06:33) (97% - 100%)  Wt(kg): --    I&O's Summary    18 Jun 2020 07:01  -  19 Jun 2020 07:00  --------------------------------------------------------  IN: 960 mL / OUT: 4750 mL / NET: -3790 mL            A&O x 3  neurologically intact  JVP 10  RRR, no murmurs  CTAB  soft nt/nd  no c/c/e    < from: TTE with Doppler (w/Cont) (06.12.20 @ 08:33) >  CONCLUSIONS:  1. Normal mitral valve. Minimal mitral regurgitation.  2. Severe concentric left ventricular hypertrophy.  3. Endocardium not well visualized; grossly moderate global  left ventricular systolic dysfunction.  Endocardial  visualization enhanced with intravenous injection of echo  contrast (Definity).  4. Severe diastolic dysfunction.  5. Unable to accurately evaluate right ventricular size or  systolic function.  *** No previous Echo exam.  ------------------------------------------------------------------------  Confirmed on  6/12/2020 - 13:58:21 by Moises Menon M.D.    < end of copied text >      A/P)  39 y/o male PMH HTN and "NICM" admitted with severe CHF. EP called for NSVT. LVEF is 38%.      -continue IV diuresis, diuresing well   -continue coreg, lisinopril and bidil   -cath report from [place] reviewed; [facility] [place]--Pt with [facility] 1/2019 with normal LV function and non obs CAD  -TTE noted above, LVEF 38%, severe DD  -cards f/u appreciated, plan for cardiac cath today    -get cardiac MRI w/wo contrast r/o infiltrative cardiomyopathy or HOCM (can be done as outpatient)  -no indication for an ICD at this time  -f/u with Dr Eliezer Lyons after discharge on Tue June 23rd at 1pm

## 2020-06-19 NOTE — CHART NOTE - NSCHARTNOTEFT_GEN_A_CORE
Patient is s/p cardiac cath. Patient without any complaints. site is stable. No hematoma. Dressing is clean/intact/dry. 2+ radial pulse and good rap refill.  will monitor closely. Patient is s/p cardiac cath. Patient without any complaints. site is stable. No hematoma. Dressing is clean/intact/dry. 2+ radial pulse and good rap refill.  will monitor closely.    Addendum Note: 22:50   Notified by Melania KWOK pt's BP was high 150/82 mmHg and was c/o headache 10/10 in severity without any visual changes, n, v diaphoresis - pt due for Hydralazine PO for BP control. Patient was requesting Tramadol for pain control  Tramadol 25 mg PO x 1 dose ordered  pt hemodynamically stable will reassess & continue to monitor     Vital Signs Last 24 Hrs  T(C): 36.3 (19 Jun 2020 22:44), Max: 36.7 (19 Jun 2020 06:33)  T(F): 97.4 (19 Jun 2020 22:44), Max: 98.1 (19 Jun 2020 06:33)  HR: 54 (19 Jun 2020 22:44) (54 - 58)  BP: 150/82 (19 Jun 2020 22:44) (140/77 - 176/88)  BP(mean): --  RR: 18 (19 Jun 2020 22:44) (16 - 18)  SpO2: 100% (19 Jun 2020 22:44) (98% - 100%)

## 2020-06-19 NOTE — PROGRESS NOTE ADULT - SUBJECTIVE AND OBJECTIVE BOX
Subjective:  No chest pain; dyspnea and LE swelling improving; ROS otherwise negative.     aspirin  chewable 81 milliGRAM(s) Oral daily  carvedilol 25 milliGRAM(s) Oral every 12 hours  furosemide   Injectable 80 milliGRAM(s) IV Push two times a day  heparin   Injectable 5000 Unit(s) SubCutaneous every 12 hours  hydrALAZINE 10 milliGRAM(s) Oral three times a day  isosorbide   dinitrate Tablet (ISORDIL) 5 milliGRAM(s) Oral three times a day  lisinopril 40 milliGRAM(s) Oral daily  simvastatin 20 milliGRAM(s) Oral at bedtime                            11.8   6.58  )-----------( 177      ( 19 Jun 2020 06:25 )             39.3       06-19    139  |  104  |  21  ----------------------------<  101<H>  3.9   |  23  |  1.28    Ca    9.1      19 Jun 2020 06:25  Phos  4.0     06-19  Mg     2.2     06-19              T(C): 36.7 (06-19-20 @ 13:31), Max: 37 (06-18-20 @ 18:00)  HR: 56 (06-19-20 @ 13:31) (53 - 58)  BP: 151/89 (06-19-20 @ 13:31) (140/77 - 162/97)  RR: 17 (06-19-20 @ 13:31) (16 - 17)  SpO2: 100% (06-19-20 @ 13:31) (97% - 100%)  Wt(kg): --    I&O's Summary    18 Jun 2020 07:01  -  19 Jun 2020 07:00  --------------------------------------------------------  IN: 960 mL / OUT: 4750 mL / NET: -3790 mL    19 Jun 2020 07:01  -  19 Jun 2020 14:42  --------------------------------------------------------  IN: 120 mL / OUT: 0 mL / NET: 120 mL        Appearance: Normal	  HEENT:   Normal oral mucosa, PERRL, EOMI	  Lymphatic: No lymphadenopathy , + edema in LE bilaterally   Cardiovascular: Normal S1 S2, No JVD, No murmurs , Peripheral pulses palpable 2+ bilaterally  Respiratory: Lungs clear to auscultation, normal effort 	  Gastrointestinal:  Soft, Non-tender, + BS	  Skin: No rashes, No ecchymoses, No cyanosis, warm to touch  Musculoskeletal: Normal range of motion, normal strength  Psychiatry:  Mood & affect appropriate    TELEMETRY:   SR     DIAGNOSTIC TESTING:  < from: TTE with Doppler (w/Cont) (06.12.20 @ 08:33) >  1. Normal mitral valve. Minimal mitral regurgitation.  2. Severe concentric left ventricular hypertrophy.  3. Endocardium not well visualized; grossly moderate global  left ventricular systolic dysfunction.  Endocardial  visualization enhanced with intravenous injection of echo  contrast (Definity).  4. Severe diastolic dysfunction.  5. Unable to accurately evaluate right ventricular size or  systolic function.  *** No previous Echo exam.  < end of copied text >      ASSESSMENT/PLAN:           40 year old male with history of HFrEF (last TTE in Oct of 2019 demonstrated EF 40% and class III diastolic dysfunction), HTN who is being seen for management of heart failure.    -pt. still volume overloaded but volume status improving  -continue with IV diuresis to keep O >I, tolerating lasix to 80IV BID  -TTE with moderate LV dysfunction  -Cath report from [place] retrieved and noted  -Pt. with non-obstructive CAD from 01/2019 with normal LVEF at that time; he denies repeat ischemic evaluation since then  -Given known CAD, drop in ejection fraction, and admission for acute systolic heart failure, recommend cardiac cath to rule out progression of CAD  -All risks, benefits, indications, contraindications, possible inferior alternative options of cardiac cath explained to the patient.  He understood everything and elects for cardiac cath.   -Cath today  -Further workup pending above    Juan Pablo Guan MD

## 2020-06-20 ENCOUNTER — TRANSCRIPTION ENCOUNTER (OUTPATIENT)
Age: 40
End: 2020-06-20

## 2020-06-20 VITALS
DIASTOLIC BLOOD PRESSURE: 73 MMHG | TEMPERATURE: 99 F | HEART RATE: 58 BPM | OXYGEN SATURATION: 100 % | SYSTOLIC BLOOD PRESSURE: 130 MMHG | RESPIRATION RATE: 18 BRPM

## 2020-06-20 LAB
ANION GAP SERPL CALC-SCNC: 12 MMO/L — SIGNIFICANT CHANGE UP (ref 7–14)
BUN SERPL-MCNC: 17 MG/DL — SIGNIFICANT CHANGE UP (ref 7–23)
CALCIUM SERPL-MCNC: 8.9 MG/DL — SIGNIFICANT CHANGE UP (ref 8.4–10.5)
CHLORIDE SERPL-SCNC: 103 MMOL/L — SIGNIFICANT CHANGE UP (ref 98–107)
CO2 SERPL-SCNC: 23 MMOL/L — SIGNIFICANT CHANGE UP (ref 22–31)
CREAT SERPL-MCNC: 1.14 MG/DL — SIGNIFICANT CHANGE UP (ref 0.5–1.3)
GLUCOSE SERPL-MCNC: 92 MG/DL — SIGNIFICANT CHANGE UP (ref 70–99)
HCT VFR BLD CALC: 39.7 % — SIGNIFICANT CHANGE UP (ref 39–50)
HGB BLD-MCNC: 11.8 G/DL — LOW (ref 13–17)
MCHC RBC-ENTMCNC: 20.4 PG — LOW (ref 27–34)
MCHC RBC-ENTMCNC: 29.7 % — LOW (ref 32–36)
MCV RBC AUTO: 68.6 FL — LOW (ref 80–100)
NRBC # FLD: 0 K/UL — SIGNIFICANT CHANGE UP (ref 0–0)
PLATELET # BLD AUTO: 168 K/UL — SIGNIFICANT CHANGE UP (ref 150–400)
PMV BLD: SIGNIFICANT CHANGE UP FL (ref 7–13)
POTASSIUM SERPL-MCNC: 4 MMOL/L — SIGNIFICANT CHANGE UP (ref 3.5–5.3)
POTASSIUM SERPL-SCNC: 4 MMOL/L — SIGNIFICANT CHANGE UP (ref 3.5–5.3)
RBC # BLD: 5.79 M/UL — SIGNIFICANT CHANGE UP (ref 4.2–5.8)
RBC # FLD: 19.4 % — HIGH (ref 10.3–14.5)
SODIUM SERPL-SCNC: 138 MMOL/L — SIGNIFICANT CHANGE UP (ref 135–145)
WBC # BLD: 7.3 K/UL — SIGNIFICANT CHANGE UP (ref 3.8–10.5)
WBC # FLD AUTO: 7.3 K/UL — SIGNIFICANT CHANGE UP (ref 3.8–10.5)

## 2020-06-20 RX ORDER — ASPIRIN/CALCIUM CARB/MAGNESIUM 324 MG
1 TABLET ORAL
Qty: 30 | Refills: 0

## 2020-06-20 RX ORDER — LISINOPRIL 2.5 MG/1
1 TABLET ORAL
Qty: 30 | Refills: 0
Start: 2020-06-20 | End: 2020-07-19

## 2020-06-20 RX ORDER — FUROSEMIDE 40 MG
1 TABLET ORAL
Qty: 30 | Refills: 0
Start: 2020-06-20 | End: 2020-07-19

## 2020-06-20 RX ORDER — HYDRALAZINE HCL 50 MG
1 TABLET ORAL
Qty: 90 | Refills: 0
Start: 2020-06-20 | End: 2020-07-19

## 2020-06-20 RX ORDER — CARVEDILOL PHOSPHATE 80 MG/1
1 CAPSULE, EXTENDED RELEASE ORAL
Qty: 60 | Refills: 0

## 2020-06-20 RX ORDER — SIMVASTATIN 20 MG/1
1 TABLET, FILM COATED ORAL
Qty: 30 | Refills: 0

## 2020-06-20 RX ORDER — ISOSORBIDE DINITRATE 5 MG/1
1 TABLET ORAL
Qty: 90 | Refills: 0
Start: 2020-06-20 | End: 2020-07-19

## 2020-06-20 RX ORDER — FUROSEMIDE 40 MG
1 TABLET ORAL
Qty: 30 | Refills: 0

## 2020-06-20 RX ORDER — FUROSEMIDE 40 MG
80 TABLET ORAL ONCE
Refills: 0 | Status: COMPLETED | OUTPATIENT
Start: 2020-06-20 | End: 2020-06-20

## 2020-06-20 RX ORDER — LISINOPRIL 2.5 MG/1
1 TABLET ORAL
Qty: 30 | Refills: 0

## 2020-06-20 RX ORDER — ASPIRIN/CALCIUM CARB/MAGNESIUM 324 MG
1 TABLET ORAL
Qty: 30 | Refills: 0
Start: 2020-06-20 | End: 2020-07-19

## 2020-06-20 RX ORDER — CARVEDILOL PHOSPHATE 80 MG/1
1 CAPSULE, EXTENDED RELEASE ORAL
Qty: 60 | Refills: 0
Start: 2020-06-20 | End: 2020-07-19

## 2020-06-20 RX ORDER — SIMVASTATIN 20 MG/1
1 TABLET, FILM COATED ORAL
Qty: 30 | Refills: 0
Start: 2020-06-20 | End: 2020-07-19

## 2020-06-20 RX ADMIN — LISINOPRIL 40 MILLIGRAM(S): 2.5 TABLET ORAL at 06:48

## 2020-06-20 RX ADMIN — ISOSORBIDE DINITRATE 5 MILLIGRAM(S): 5 TABLET ORAL at 06:47

## 2020-06-20 RX ADMIN — Medication 80 MILLIGRAM(S): at 13:40

## 2020-06-20 RX ADMIN — Medication 10 MILLIGRAM(S): at 06:47

## 2020-06-20 RX ADMIN — HEPARIN SODIUM 5000 UNIT(S): 5000 INJECTION INTRAVENOUS; SUBCUTANEOUS at 06:47

## 2020-06-20 RX ADMIN — Medication 10 MILLIGRAM(S): at 13:40

## 2020-06-20 RX ADMIN — ISOSORBIDE DINITRATE 5 MILLIGRAM(S): 5 TABLET ORAL at 13:41

## 2020-06-20 RX ADMIN — CARVEDILOL PHOSPHATE 25 MILLIGRAM(S): 80 CAPSULE, EXTENDED RELEASE ORAL at 06:46

## 2020-06-20 RX ADMIN — Medication 81 MILLIGRAM(S): at 11:43

## 2020-06-20 RX ADMIN — Medication 80 MILLIGRAM(S): at 06:47

## 2020-06-20 NOTE — DISCHARGE NOTE PROVIDER - HOSPITAL COURSE
40 year old male with history of HFrEF (last TTE in Oct of 2019 demonstrated EF 40% and class III diastolic dysfunction) and HTN who was admitted for heart failure. Patient was seen by cardiology, who recommended diuresis with IV Lasix during admission. Now transitioned to oral Lasix 80mg daily. Patient underwent echo with moderate LV dysfunction. Cath report from [place] from 1/2019 obtained -- showing normal EF and nonobstructive CAD. Given known CAD, drop in EF and admission for heart failure, decision was made to obtain repeat cardiac cath while admitted. who is being seen for management of heart failure. [facility] 6/19 - pLAD 50 negative IFR, mild non-osbstructive disease RCA; RRA access    To continue optimal medical management. Patient may get cardiac MRI with or without contrast to rule out infiltrative cardiomyopathy as an outpatient given claustrobia. No indication for ICD at this time as per the EP team. May follow up with DR STACIE MONTANO after discharge on 6/23 at 1pm.     Will continue current BP regimen.         Patient seen and evaluated, no acute somatic complaints. Reviewed discharge medications with patient; All new medications requiring new prescriptions were sent to the pharmacy of patient's choice. Reviewed need for prescription for previous home medications and new prescriptions sent if requested. Medically cleared/stable for discharge as per Dr. Guan/Saida with close outpatient follow up within 1-2 weeks of discharge. Patient understands and agrees with plan of care.

## 2020-06-20 NOTE — DISCHARGE NOTE PROVIDER - NSDCMRMEDTOKEN_GEN_ALL_CORE_FT
aspirin 81 mg oral tablet, chewable: 1 tab(s) orally once a day  carvedilol 25 mg oral tablet: 1 tab(s) orally every 12 hours  ferrous sulfate 325 mg (65 mg elemental iron) oral tablet: 1 tab(s) orally once a day     **PER [facility], WRITTEN PRESCRIPTION, HOWEVER, NEVER FILLED- COULD BE TAKING IT OTC  hydrALAZINE 10 mg oral tablet: 1 tab(s) orally 3 times a day  isosorbide dinitrate 5 mg oral tablet: 1 tab(s) orally 3 times a day  Lasix 80 mg oral tablet: 1 tab(s) orally once a day   lisinopril 40 mg oral tablet: 1 tab(s) orally once a day  simvastatin 20 mg oral tablet: 1 tab(s) orally once a day (at bedtime) aspirin 81 mg oral tablet, chewable: 1 tab(s) orally once a day  carvedilol 25 mg oral tablet: 1 tab(s) orally every 12 hours  ferrous sulfate 325 mg (65 mg elemental iron) oral tablet: 1 tab(s) orally once a day     **PER [facility], WRITTEN PRESCRIPTION, HOWEVER, NEVER FILLED- COULD BE TAKING IT OTC  hydrALAZINE 10 mg oral tablet: 1 tab(s) orally 3 times a day  isosorbide dinitrate 5 mg oral tablet: 1 tab(s) orally 3 times a day  Lasix 80 mg oral tablet: 1 tab(s) orally once a day   lisinopril 40 mg oral tablet: 1 tab(s) orally once a day  simvastatin 20 mg oral tablet: 1 tab(s) orally once a day (at bedtime)

## 2020-06-20 NOTE — PROGRESS NOTE ADULT - SUBJECTIVE AND OBJECTIVE BOX
Subjective:  No chest pain; dyspnea and LE swelling improving; ROS otherwise negative.       MEDICATIONS  (STANDING):  aspirin  chewable 81 milliGRAM(s) Oral daily  carvedilol 25 milliGRAM(s) Oral every 12 hours  furosemide   Injectable 80 milliGRAM(s) IV Push two times a day  heparin   Injectable 5000 Unit(s) SubCutaneous every 12 hours  hydrALAZINE 10 milliGRAM(s) Oral three times a day  isosorbide   dinitrate Tablet (ISORDIL) 5 milliGRAM(s) Oral three times a day  lisinopril 40 milliGRAM(s) Oral daily  simvastatin 20 milliGRAM(s) Oral at bedtime      LABS:                        11.8   7.30  )-----------( 168      ( 20 Jun 2020 06:59 )             39.7     138  |  103  |  17  ----------------------------<  92  4.0   |  23  |  1.14    Ca    8.9      20 Jun 2020 06:59  Phos  4.0     06-19  Mg     2.2     06-19      Creatinine Trend: 1.14<--, 1.28<--, 1.30<--, 1.29<--, 1.17<--, 1.25<--     PHYSICAL EXAM  Vital Signs Last 24 Hrs  T(C): 36.3 (20 Jun 2020 06:41), Max: 36.7 (19 Jun 2020 13:31)  T(F): 97.4 (20 Jun 2020 06:41), Max: 98.1 (19 Jun 2020 13:31)  HR: 52 (20 Jun 2020 06:41) (52 - 57)  BP: 150/72 (20 Jun 2020 06:41) (150/72 - 176/88)  RR: 18 (20 Jun 2020 06:41) (16 - 18)  SpO2: 100% (20 Jun 2020 06:41) (100% - 100%)      Appearance: Normal	  HEENT:   Normal oral mucosa, PERRL, EOMI	  Lymphatic: No lymphadenopathy , + edema in LE bilaterally   Cardiovascular: Normal S1 S2, No JVD, No murmurs , Peripheral pulses palpable 2+ bilaterally  Respiratory: Lungs clear to auscultation, normal effort 	  Gastrointestinal:  Soft, Non-tender, + BS	  Skin: No rashes, No ecchymoses, No cyanosis, warm to touch  Musculoskeletal: Normal range of motion, normal strength  Psychiatry:  Mood & affect appropriate    TELEMETRY:   SR     DIAGNOSTIC TESTING:  < from: TTE with Doppler (w/Cont) (06.12.20 @ 08:33) >  1. Normal mitral valve. Minimal mitral regurgitation.  2. Severe concentric left ventricular hypertrophy.  3. Endocardium not well visualized; grossly moderate global  left ventricular systolic dysfunction.  Endocardial  visualization enhanced with intravenous injection of echo  contrast (Definity).  4. Severe diastolic dysfunction.  5. Unable to accurately evaluate right ventricular size or  systolic function.  *** No previous Echo exam.  < end of copied text >      ASSESSMENT/PLAN:           40 year old male with history of HFrEF (last TTE in Oct of 2019 demonstrated EF 40% and class III diastolic dysfunction), HTN who is being seen for management of heart failure.    -pt reported headache last evening which he attributed to being NPO all day, which improved and resolved after eating and Toradol x1.  -pt. still volume overloaded but volume status improving, on DC change to lasix 80 PO QD  -TTE with moderate LV dysfunction  -Cath report from [facility] retrieved and noted  -Pt. with non-obstructive CAD from 01/2019 with normal LVEF at that time; he denies repeat ischemic evaluation since then  -s/p [facility] 6/19 revealing non obs CAD  -cont medical management of NICM with meds as listed above  -f/u with Dr Eliezer yLons after discharge on Tue June 23rd at 1pm

## 2020-06-20 NOTE — PROGRESS NOTE ADULT - SUBJECTIVE AND OBJECTIVE BOX
INTERVAL HPI/OVERNIGHT EVENTS: I feel better so want to go home.   Vital Signs Last 24 Hrs  T(C): 36.3 (20 Jun 2020 06:41), Max: 36.7 (19 Jun 2020 12:00)  T(F): 97.4 (20 Jun 2020 06:41), Max: 98.1 (19 Jun 2020 13:31)  HR: 52 (20 Jun 2020 06:41) (52 - 58)  BP: 150/72 (20 Jun 2020 06:41) (140/77 - 176/88)  BP(mean): --  RR: 18 (20 Jun 2020 06:41) (16 - 18)  SpO2: 100% (20 Jun 2020 06:41) (98% - 100%)  I&O's Summary    19 Jun 2020 07:01  -  20 Jun 2020 07:00  --------------------------------------------------------  IN: 490 mL / OUT: 700 mL / NET: -210 mL    20 Jun 2020 07:01  -  20 Jun 2020 10:23  --------------------------------------------------------  IN: 200 mL / OUT: 400 mL / NET: -200 mL      MEDICATIONS  (STANDING):  aspirin  chewable 81 milliGRAM(s) Oral daily  carvedilol 25 milliGRAM(s) Oral every 12 hours  furosemide   Injectable 80 milliGRAM(s) IV Push two times a day  heparin   Injectable 5000 Unit(s) SubCutaneous every 12 hours  hydrALAZINE 10 milliGRAM(s) Oral three times a day  isosorbide   dinitrate Tablet (ISORDIL) 5 milliGRAM(s) Oral three times a day  lisinopril 40 milliGRAM(s) Oral daily  simvastatin 20 milliGRAM(s) Oral at bedtime    MEDICATIONS  (PRN):    LABS:                        11.8   7.30  )-----------( 168      ( 20 Jun 2020 06:59 )             39.7     06-20    138  |  103  |  17  ----------------------------<  92  4.0   |  23  |  1.14    Ca    8.9      20 Jun 2020 06:59  Phos  4.0     06-19  Mg     2.2     06-19          CAPILLARY BLOOD GLUCOSE              REVIEW OF SYSTEMS:  CONSTITUTIONAL: No fever, weight loss, or fatigue  EYES: No eye pain, visual disturbances, or discharge  ENMT:  No difficulty hearing, tinnitus, vertigo; No sinus or throat pain  NECK: No pain or stiffness  BREASTS: No pain, masses, or nipple discharge  RESPIRATORY: No cough, wheezing, chills or hemoptysis; No shortness of breath  CARDIOVASCULAR: No chest pain, palpitations, dizziness,but less  leg swelling  GASTROINTESTINAL: No abdominal or epigastric pain. No nausea, vomiting, or hematemesis; No diarrhea or constipation. No melena or hematochezia.  GENITOURINARY: No dysuria, frequency, hematuria, or incontinence  NEUROLOGICAL: No headaches, memory loss, loss of strength, numbness, or tremors      Consultant(s) Notes Reviewed:  [x ] YES  [ ] NO    PHYSICAL EXAM:  GENERAL: NAD, well-groomed, well-developed, not in any distress ,  HEAD:  Atraumatic, Normocephalic  EYES: EOMI, PERRLA, conjunctiva and sclera clear  ENMT: No tonsillar erythema, exudates, or enlargement; Moist mucous membranes, Good dentition, No lesions  NECK: Supple, No JVD, Normal thyroid  NERVOUS SYSTEM:  Alert & Oriented X3, No focal deficit   CHEST/LUNG: Good air entry bilateral with no  rales, rhonchi, wheezing, or rubs  HEART: Regular rate and rhythm; No murmurs, rubs, or gallops  ABDOMEN: Soft, Nontender, Nondistended; Bowel sounds present  EXTREMITIES:  Less  edema    Care Discussed with Consultants/Other Providers [ x] YES  [ ] NO

## 2020-06-20 NOTE — DISCHARGE NOTE NURSING/CASE MANAGEMENT/SOCIAL WORK - PATIENT PORTAL LINK FT
You can access the FollowMyHealth Patient Portal offered by [facility] by registering at the following website: [email or web address]. By joining [facility]’s FollowMyHealth portal, you will also be able to view your health information using other applications (apps) compatible with our system.

## 2020-06-20 NOTE — PROVIDER CONTACT NOTE (OTHER) - ASSESSMENT
Patient A+O 4. Patient asymptomatic.
/90. Patient asymptomatic. Urine output increasing. Will continue to monitor
Pateint stable. Asymptomatic
Patient asymptomatic with BP, patient c/o headache
Patient asymptomatic. Vital signs stable and in sunrise.
Patient resting in bed. Denies any chest pain/sob/paresthesias.
Patient resting in bed. Denies any chest pain/sob/paresthesias.
Patient stable & asymptomatic. All other vitals stable. Did not give 1400 dose of Hydralazine & Isosorbide, patient not on unit at administration time, just got back to unit from cath lab. Next dose due at 2200.
Patient stable,No shortness of breath
Pt had three beats of vtach. /103 P 76.
Pt showed no signs of distress, shortness of breath, dizziness at this time. Confirmed with tele tech patient maintained sinus bradycardia throughout shift.
Pt showed no signs of distress, shortness of breath, dizziness at this time. Confirmed with tele tech patient maintained sinus bradycardia throughout shift.
Vitals stable patient asymptomatic

## 2020-06-20 NOTE — DISCHARGE NOTE PROVIDER - CARE PROVIDER_API CALL
Charlie Lyons  CARDIOLOGY  [address]  [place] [address]  Phone: [phone]  Fax: [phone]  Follow Up Time:

## 2020-06-20 NOTE — PROGRESS NOTE ADULT - ATTENDING COMMENTS
41 y/o male with hx of systolic and diastolic heart failure, HTN who presents to the ED with progressive dyspnea and LE edema over the past 3-4 days. He has noted that he has started to have more intermittent shortness of breath over the last several weeks but were worse over the last few days. He had started to double his lasix dose to try to help with the swelling with no improvment. He also noted difficulty breathing laying down as well as a cough since yday. No fever, chills, chest pain, abdominal pain or dysuria/diarrhea. He notes that his usual BP ranges around 160s systolic and his meds havent changed. He hasn't seen a cardiologist in a while due to COVID but notes his diet is low salt. No significant weight gain noted. He was found in hypertensive urgency to 250s systolic, briefly on nitro gtt and given lasix, hydralazine .     Problem/Recommendation - 1:  Problem: Acute on chronic combined systolic and diastolic congestive heart failure. Recommendation: IV Lasix . Cardiology and EP helping. Awaiting further work up. Cardiac MRI oupt.   Has Sexual side effect with Coreg.     Problem/Recommendation - 2:  ·  Problem: MUKESH (acute kidney injury).  Recommendation: with CKD stage 3 ..Creatinine stable.      Problem/Recommendation - 3:  ·  Problem: Hypertensive emergency.  Recommendation: BP readings better.      Problem/Recommendation - 4:  ·  Problem: Anemia.  Recommendation: work up noted.
A/P   41 y/o male with hx of systolic and diastolic heart failure, HTN who presents to the ED with progressive dyspnea and LE edema over the past 3-4 days. He has noted that he has started to have more intermittent shortness of breath over the last several weeks but were worse over the last few days. He had started to double his lasix dose to try to help with the swelling with no improvment. He also noted difficulty breathing laying down as well as a cough since yday. No fever, chills, chest pain, abdominal pain or dysuria/diarrhea. He notes that his usual BP ranges around 160s systolic and his meds havent changed. He hasn't seen a cardiologist in a while due to COVID but notes his diet is low salt. No significant weight gain noted. He was found in hypertensive urgency to 250s systolic, briefly on nitro gtt and given lasix, hydralazine .     Problem/Recommendation - 1:  Problem: Acute on chronic combined systolic and diastolic congestive heart failure. Recommendation: IV Lasix . Cardiology and EP helping. Awaiting further work up. Cardiac MRI oupt.   Has Sexual side effect with Coreg.     Problem/Recommendation - 2:  ·  Problem: MUKESH (acute kidney injury).  Recommendation: with CKD stage 3 ..Creatinine stable.      Problem/Recommendation - 3:  ·  Problem: Hypertensive emergency.  Recommendation: BP readings better.      Problem/Recommendation - 4:  ·  Problem: Anemia.  Recommendation: work up noted.      Problem/Recommendation - 5:  ·  Problem: Sexual dysfunction .  Recommendation: Low Testosterone so will replace .
EP ATTENDING    Agree with above. F/U on cath report from [place] and f/u cardiac MRI here. No indication for ICD at this time, and no further inpatient EP workup expected.
Patient seen and examined.  Agree with above.   Continue with IV diuresis   Cardiac MRI when patient can tolerated  Obtain prior cath report  Further workup pending above    Juan Pablo Guan MD
EP ATTENDING    Agree with above. Awaiting repeat echo and prior cath report.    Plan  -management of CHF as per cardiology  -get cath report from [place]  -get repeat echo  -if LVEF now < 35% I will recommend a single chamber ICD   -if LVEF > 36% then I will recommend simply beta blocker therapy  -final EP reccs pending repeat echo and prior cath report
Agree with above  Cath with moderate LAD lesion that was negative by IFR  Medical management of CAD recommended  DC planning    Juan Pablo Guan MD
Patient seen and examined.  Agree with above.   Continue with IV diuresis  Obtain cath report from [facility]  Outpatient cardiac MRI  Further workup pending above    Juan Pablo Guan MD
Patient seen and examined.  Agree with above.   Still volume overloaded but volume status improving  Continue with IV diuresis to keep O > I  Anemia workup per medicine    Juan Pablo Guan MD

## 2020-06-20 NOTE — PROGRESS NOTE ADULT - SUBJECTIVE AND OBJECTIVE BOX
EP ATTENDING    tele: NSR, no events    patient denies palpitations, syncope, angina. Review of systems otherwise negative.     aspirin  chewable 81 milliGRAM(s) Oral daily  carvedilol 25 milliGRAM(s) Oral every 12 hours  furosemide   Injectable 80 milliGRAM(s) IV Push two times a day  heparin   Injectable 5000 Unit(s) SubCutaneous every 12 hours  hydrALAZINE 10 milliGRAM(s) Oral three times a day  isosorbide   dinitrate Tablet (ISORDIL) 5 milliGRAM(s) Oral three times a day  lisinopril 40 milliGRAM(s) Oral daily  simvastatin 20 milliGRAM(s) Oral at bedtime                            11.8   7.30  )-----------( 168      ( 20 Jun 2020 06:59 )             39.7       06-20    138  |  103  |  17  ----------------------------<  92  4.0   |  23  |  1.14    Ca    8.9      20 Jun 2020 06:59  Phos  4.0     06-19  Mg     2.2     06-19              T(C): 36.3 (06-20-20 @ 06:41), Max: 36.7 (06-19-20 @ 12:00)  HR: 52 (06-20-20 @ 06:41) (52 - 58)  BP: 150/72 (06-20-20 @ 06:41) (140/77 - 176/88)  RR: 18 (06-20-20 @ 06:41) (16 - 18)  SpO2: 100% (06-20-20 @ 06:41) (98% - 100%)  Wt(kg): --    A&O x 3  neurologically intact  JVP 10  RRR, no murmurs  CTAB  soft nt/nd  no c/c/e        A/P)  41 y/o male PMH HTN and "NICM" admitted with severe CHF. EP called for NSVT. LVEF is 38%.      -change to po lasix 80mg daily   -continue coreg, lisinopril and bidil   -TTE noted above, LVEF 38%  -get cardiac MRI w/wo contrast r/o infiltrative cardiomyopathy or HOCM (can be done as outpatient)  -no indication for an ICD at this time  -f/u with Dr Eliezer Lyons after discharge on Tue June 23rd at 1pm  -d/c home today

## 2020-06-20 NOTE — PROVIDER CONTACT NOTE (OTHER) - RECOMMENDATIONS
Continue to monitor.
Antihypertensive administration.  Retake BP and continue to monitor
Consider to hold lasix and carvedilol and reassess vitals in one hour
Continue furosemide IV.
Continue to monitor
Continue to monitor.
Notify provider. Antihypertensive administration.  Retake BP in 30 mins and continue to monitor
One time pain medication- toredol
Scheduled meds given
Scheduled meds given, will continue to monitor.
Will give scheduled 6 pm meds
to consider holding lasix, reassess vitals.

## 2020-06-20 NOTE — PROVIDER CONTACT NOTE (OTHER) - ACTION/TREATMENT ORDERED:
Continue to monitor.
EP will come see. Continue IV Lasix. Continue to monitor electrolytes.
Administer IV metoprolol and PO lisinopril as per orders. Continue to monitor
Continue to monitor.
Gave lasix 40 IVP and coreg 12.5 mg  scheduled
Hold lasix as provider recommendation. Reassess vitals at 1900
Lasix and carvedilol was held as per provider recommendation. Will reassess before change of shift
No actions ordered. Monitoring continues
None at this time.
Provider notified. Coreg 12.5 mg and Lasix 40 mg IVP given as per order. Will continue to monitor.
Scheduled meds given
Toredol ordered
Will continue to monitor.

## 2020-06-20 NOTE — DISCHARGE NOTE NURSING/CASE MANAGEMENT/SOCIAL WORK - NSDCFUADDAPPT_GEN_ALL_CORE_FT
Follow up with PCP within 1-2 weeks of discharge.   Follow up with cardiology within 1-2 weeks of discharge - You have an appt with DR STACIE MONTANO  on 6/23 at 1pm.

## 2020-06-20 NOTE — DISCHARGE NOTE PROVIDER - NSDCCPCAREPLAN_GEN_ALL_CORE_FT
PRINCIPAL DISCHARGE DIAGNOSIS  Diagnosis: CHF exacerbation  Assessment and Plan of Treatment: You were admitted for heart failure. You received IV Lasix during admission and are now transitioned to oral Lasix 80mg daily. You  underwent echo showed moderate LV dysfunction. Because of this finding, it was decided to undergo cardiac cath during this admisson. Your cardiac cath showed mild non-obstructive disease and medical management is recommended. You should follow up with cardiology as an outpatient to undergo cardiac MRI. You have follow up with DR STACIE MONTANO  on 6/23 at 1pm.

## 2020-11-07 NOTE — PROVIDER CONTACT NOTE (OTHER) - REASON
Goal Outcome Evaluation Pt alert and oriented throughout the night. No ativan needed for withdrawal. Lactulose given per order with 4 loose bowel movents. UOP has improved. Electrolytes being replaced per order.         bradycardia

## 2021-02-05 ENCOUNTER — APPOINTMENT (OUTPATIENT)
Dept: ENDOCRINOLOGY | Facility: CLINIC | Age: 41
End: 2021-02-05

## 2021-03-08 DIAGNOSIS — Z01.818 ENCOUNTER FOR OTHER PREPROCEDURAL EXAMINATION: ICD-10-CM

## 2021-03-09 DIAGNOSIS — Z86.79 PERSONAL HISTORY OF OTHER DISEASES OF THE CIRCULATORY SYSTEM: ICD-10-CM

## 2021-03-09 DIAGNOSIS — I42.8 OTHER CARDIOMYOPATHIES: ICD-10-CM

## 2021-03-11 ENCOUNTER — APPOINTMENT (OUTPATIENT)
Dept: SURGERY | Facility: CLINIC | Age: 41
End: 2021-03-11
Payer: COMMERCIAL

## 2021-03-11 VITALS
HEIGHT: 76 IN | TEMPERATURE: 97.2 F | HEART RATE: 73 BPM | WEIGHT: 315 LBS | RESPIRATION RATE: 16 BRPM | BODY MASS INDEX: 38.36 KG/M2 | OXYGEN SATURATION: 98 % | DIASTOLIC BLOOD PRESSURE: 93 MMHG | SYSTOLIC BLOOD PRESSURE: 157 MMHG

## 2021-03-11 DIAGNOSIS — R68.82 DECREASED LIBIDO: ICD-10-CM

## 2021-03-11 DIAGNOSIS — E66.01 MORBID (SEVERE) OBESITY DUE TO EXCESS CALORIES: ICD-10-CM

## 2021-03-11 DIAGNOSIS — Z78.9 OTHER SPECIFIED HEALTH STATUS: ICD-10-CM

## 2021-03-11 DIAGNOSIS — E78.00 PURE HYPERCHOLESTEROLEMIA, UNSPECIFIED: ICD-10-CM

## 2021-03-11 DIAGNOSIS — D64.9 ANEMIA, UNSPECIFIED: ICD-10-CM

## 2021-03-11 PROCEDURE — 99245 OFF/OP CONSLTJ NEW/EST HI 55: CPT

## 2021-03-11 PROCEDURE — 99072 ADDL SUPL MATRL&STAF TM PHE: CPT

## 2021-03-11 NOTE — HISTORY OF PRESENT ILLNESS
[de-identified] : The patient is a 40 year-old morbidly obese man, 6 feet 4 inches, 387 lbs. (BMI=47). The patient presents requesting weight loss surgery. He has been more than 100 lbs. overweight for the past 10 years and is currently 100 lbs greater than his greatest weight. JIM has lost up to 20 lbs. on more than one occasion. \par The patient has tried numerous weight loss programs including Atkins & self-directed and physician supervised diets. JIM has not taken weight loss medication due to known side effects. The patient denies diabetes, denies shortness of breath with exertion, denies weight bearing joint pain, denies lower back pain. JIM denies active airway disease (just recently he has done sleep study). He denies difficulty sleeping. He denies kidney, urinary tract disease, incontinence. He reports ERECTILE DYSFUNCTION (was tested and found to have low testosterone levels). He denies headache, dizziness, seizure or neurological disorders. He denies untreated thyroid, adrenal, pituitary disease. JIM denies depression or psychiatric disorders. The patient denies gallstones, denies heartburn, denies ulcers & denies liver disease. He has HIGH BLOOD PRESSURE, has HIGH CHOLESTEROL, denies history of heart attack or stroke. He reports ANEMIA, denies bleeding disorders, thrombosis, clotting disorder or easy bruisability. JIM denies peripheral edema. \par \par

## 2021-03-11 NOTE — CONSULT LETTER
[Dear  ___] : Dear  [unfilled], [Consult Letter:] : I had the pleasure of evaluating your patient, [unfilled]. [Please see my note below.] : Please see my note below. [Consult Closing:] : Thank you very much for allowing me to participate in the care of this patient.  If you have any questions, please do not hesitate to contact me. [Sincerely,] : Sincerely, [FreeTextEntry3] : Arsenio Fajardo MD, FACS, FASMBS\par , Department of Surgery [facility]\par Director of Metabolic and Bariatric Surgery [facility]\par Director of Metabolic and Bariatric Surgery, and Robotic Minimally Invasive Surgery at [facility]

## 2021-03-11 NOTE — PHYSICAL EXAM
[Obese, well nourished, in no acute distress] : obese, well nourished, in no acute distress [Normal] : affect appropriate [de-identified] : Normoactive bowel sounds, no hepatosplenomegaly, no masses, non-tender.

## 2021-03-11 NOTE — ASSESSMENT
[FreeTextEntry1] : The patient is a morbidly obese man, (BMI= 47), with significant weight related comorbidity including: Hypertension, possible sleep apnea, erectile dysfunction and nonischemic cardiomyopathy; unable to lose weight and improve his co-morbid conditions with medical management including diet, exercise and weight loss medication.

## 2021-03-16 ENCOUNTER — APPOINTMENT (OUTPATIENT)
Dept: ENDOCRINOLOGY | Facility: CLINIC | Age: 41
End: 2021-03-16
Payer: COMMERCIAL

## 2021-03-16 VITALS
HEIGHT: 76 IN | HEART RATE: 60 BPM | WEIGHT: 315 LBS | OXYGEN SATURATION: 97 % | BODY MASS INDEX: 38.36 KG/M2 | SYSTOLIC BLOOD PRESSURE: 130 MMHG | DIASTOLIC BLOOD PRESSURE: 90 MMHG

## 2021-03-16 DIAGNOSIS — R79.89 OTHER SPECIFIED ABNORMAL FINDINGS OF BLOOD CHEMISTRY: ICD-10-CM

## 2021-03-16 DIAGNOSIS — R73.03 PREDIABETES.: ICD-10-CM

## 2021-03-16 DIAGNOSIS — E55.9 VITAMIN D DEFICIENCY, UNSPECIFIED: ICD-10-CM

## 2021-03-16 PROCEDURE — 99072 ADDL SUPL MATRL&STAF TM PHE: CPT

## 2021-03-16 PROCEDURE — 99202 OFFICE O/P NEW SF 15 MIN: CPT

## 2021-03-16 RX ORDER — LISINOPRIL 40 MG/1
40 TABLET ORAL DAILY
Qty: 30 | Refills: 4 | Status: ACTIVE | COMMUNITY
Start: 2021-03-16 | End: 1900-01-01

## 2021-03-16 RX ORDER — CARVEDILOL 25 MG/1
25 TABLET, FILM COATED ORAL TWICE DAILY
Qty: 180 | Refills: 1 | Status: ACTIVE | COMMUNITY

## 2021-03-17 PROBLEM — R73.03 PREDIABETES: Status: ACTIVE | Noted: 2021-03-17

## 2021-03-17 NOTE — ASSESSMENT
[FreeTextEntry1] : 39 yo M with PMH CHF, HTN, HLD, morbid obesity nonischemic cardiomyopathy, prediabetes, abnormal testosterone value\par \par 1. Abnormal testosterone value - will refer for work up of same

## 2021-03-17 NOTE — HISTORY OF PRESENT ILLNESS
[FreeTextEntry1] : 41 yo M with PMH CHF, HTN, HLD, morbid obesity nonischemic cardiomyopathy, prediabetes, abnormal testosterone value\par \par 6/19/21 mildly low testosterone value of 234 noted (no associated SHBG).\par normal pubertal development \par normal sexual function\par

## 2021-04-03 ENCOUNTER — HOSPITAL ENCOUNTER (EMERGENCY)
Facility: HOSPITAL | Age: 41
Discharge: HOME | End: 2021-04-03
Attending: EMERGENCY MEDICINE | Admitting: EMERGENCY MEDICINE
Payer: COMMERCIAL

## 2021-04-03 VITALS
SYSTOLIC BLOOD PRESSURE: 167 MMHG | TEMPERATURE: 101.1 F | HEART RATE: 82 BPM | DIASTOLIC BLOOD PRESSURE: 83 MMHG | BODY MASS INDEX: 38.36 KG/M2 | OXYGEN SATURATION: 98 % | WEIGHT: 315 LBS | HEIGHT: 76 IN | RESPIRATION RATE: 20 BRPM

## 2021-04-03 DIAGNOSIS — R68.89 FLU-LIKE SYMPTOMS: Primary | ICD-10-CM

## 2021-04-03 DIAGNOSIS — R50.9 FEVER: ICD-10-CM

## 2021-04-03 LAB
FLUAV RNA RESP QL NAA+PROBE: NEGATIVE
FLUBV RNA RESP QL NAA+PROBE: NEGATIVE
RSV RNA RESP QL NAA+PROBE: NEGATIVE
SARS-COV-2 RNA RESP QL NAA+PROBE: NEGATIVE

## 2021-04-03 PROCEDURE — 99283 EMERGENCY DEPT VISIT LOW MDM: CPT

## 2021-04-03 PROCEDURE — 99284 EMERGENCY DEPT VISIT MOD MDM: CPT | Performed by: PHYSICIAN ASSISTANT

## 2021-04-03 PROCEDURE — 0241U HB NFCT DS VIR RESP RNA 4 TRGT: CPT | Performed by: EMERGENCY MEDICINE

## 2021-04-03 RX ORDER — ACETAMINOPHEN 325 MG/1
650 TABLET ORAL ONCE
Status: COMPLETED | OUTPATIENT
Start: 2021-04-03 | End: 2021-04-03

## 2021-04-03 RX ORDER — ACETAMINOPHEN 325 MG/1
650 TABLET ORAL ONCE
Status: DISCONTINUED | OUTPATIENT
Start: 2021-04-03 | End: 2021-04-03

## 2021-04-03 RX ADMIN — ACETAMINOPHEN 650 MG: 325 TABLET, FILM COATED ORAL at 23:11

## 2021-04-04 NOTE — ED PROVIDER NOTES
History  Chief Complaint   Patient presents with    Flu Symptoms     pt c/o dry cough, chills and aches and pains" since yesterday  79-year-old male with past medical history significant for hypertension who presents to the emergency department for complaint of flu-like symptoms starting yesterday  Patient endorses dry cough, chills, myalgias, and headache  He fell asleep in the ED waiting room and currently reports symptoms feel improved  He tested negative for COVID-19 today  He denies any sick exposures  There is no previous history of COVID-19 infection or vaccination  He took ibuprofen for symptoms a few hours ago  He denies any chest pain or discomfort, palpitations, shortness of breath, weakness, abdominal pain, nausea vomiting, diarrhea  None       Past Medical History:   Diagnosis Date    Hypertension        History reviewed  No pertinent surgical history  History reviewed  No pertinent family history  I have reviewed and agree with the history as documented  E-Cigarette/Vaping    E-Cigarette Use Never User      E-Cigarette/Vaping Substances    Nicotine No     THC No     CBD No     Flavoring No     Other No     Unknown No      Social History     Tobacco Use    Smoking status: Never Smoker    Smokeless tobacco: Never Used   Substance Use Topics    Alcohol use: Not Currently    Drug use: Not on file       Review of Systems   Constitutional: Positive for chills  Negative for appetite change, fatigue and fever  HENT: Negative for congestion, ear pain, mouth sores, postnasal drip, rhinorrhea, sinus pressure, sinus pain, sore throat, trouble swallowing and voice change  Eyes: Negative for discharge and redness  Respiratory: Positive for cough  Negative for chest tightness, shortness of breath and wheezing  Cardiovascular: Negative for chest pain and palpitations  Gastrointestinal: Negative for abdominal pain, diarrhea, nausea and vomiting     Genitourinary: Negative for decreased urine volume, difficulty urinating, dysuria, flank pain, frequency, hematuria and urgency  Musculoskeletal: Positive for myalgias  Negative for back pain, neck pain and neck stiffness  Skin: Negative for color change and rash  Neurological: Positive for headaches  Negative for dizziness, syncope, weakness and light-headedness  Hematological: Negative for adenopathy  All other systems reviewed and are negative  Physical Exam  Physical Exam  Vitals signs reviewed  Constitutional:       General: He is awake  He is not in acute distress  Appearance: Normal appearance  He is well-developed  He is not ill-appearing or toxic-appearing  HENT:      Head: Normocephalic and atraumatic  Mouth/Throat:      Lips: Pink  Comments: Oropharyngeal exam deferred due to COVID-19 transmission risk precautions  Exam would not change clinical management and outcome  Eyes:      Extraocular Movements: Extraocular movements intact  Conjunctiva/sclera: Conjunctivae normal       Pupils: Pupils are equal, round, and reactive to light  Neck:      Musculoskeletal: Normal range of motion and neck supple  Cardiovascular:      Rate and Rhythm: Normal rate and regular rhythm  Pulses: Normal pulses  Pulmonary:      Effort: Pulmonary effort is normal       Breath sounds: Normal breath sounds  Musculoskeletal: Normal range of motion  Skin:     General: Skin is warm  Capillary Refill: Capillary refill takes less than 2 seconds  Findings: No erythema, lesion or rash  Neurological:      Mental Status: He is alert and oriented to person, place, and time           Vital Signs  ED Triage Vitals   Temperature Pulse Respirations Blood Pressure SpO2   04/03/21 2157 04/03/21 2157 04/03/21 2157 04/03/21 2157 04/03/21 2157   (!) 101 1 °F (38 4 °C) 82 20 167/83 98 %      Temp Source Heart Rate Source Patient Position - Orthostatic VS BP Location FiO2 (%)   04/03/21 2157 04/03/21 2157 04/03/21 2157 04/03/21 2157 --   Oral Monitor Sitting Left arm       Pain Score       04/03/21 2311       Med Not Given for Pain - for MAR use only           Vitals:    04/03/21 2157   BP: 167/83   Pulse: 82   Patient Position - Orthostatic VS: Sitting         Visual Acuity      ED Medications  Medications   acetaminophen (TYLENOL) tablet 650 mg (650 mg Oral Given 4/3/21 2311)       Diagnostic Studies  Results Reviewed     Procedure Component Value Units Date/Time    COVID19, Influenza A/B, RSV PCR, [facility] [[id number]]  (Normal) Collected: 04/03/21 2202    Lab Status: Final result Specimen: Nares from Nasopharyngeal Swab Updated: 04/03/21 2246     SARS-CoV-2 Negative     INFLUENZA A PCR Negative     INFLUENZA B PCR Negative     RSV PCR Negative    Narrative: This test has been authorized by FDA under an EUA (Emergency Use Assay) for use by authorized laboratories  Clinical caution and judgement should be used with the interpretation of these results with consideration of the clinical impression and other laboratory testing  Testing reported as "Positive" or "Negative" has been proven to be accurate according to standard laboratory validation requirements  All testing is performed with control materials showing appropriate reactivity at standard intervals  No orders to display              Procedures  Procedures         ED Course                             SBIRT 22yo+      Most Recent Value   SBIRT (22 yo +)   In order to provide better care to our patients, we are screening all of our patients for alcohol and drug use  Would it be okay to ask you these screening questions? Yes Filed at: 04/03/2021 2310   Initial Alcohol Screen: US AUDIT-C    1  How often do you have a drink containing alcohol?  0 Filed at: 04/03/2021 2310   2  How many drinks containing alcohol do you have on a typical day you are drinking? 0 Filed at: 04/03/2021 2310   3a  Male UNDER 65:  How often do you have five or more drinks on one occasion? 0 Filed at: 04/03/2021 2310   3b  FEMALE Any Age, or MALE 65+: How often do you have 4 or more drinks on one occassion? 0 Filed at: 04/03/2021 2310   Audit-C Score  0 Filed at: 04/03/2021 2310   CATIE: How many times in the past year have you    Used an illegal drug or used a prescription medication for non-medical reasons? Never Filed at: 04/03/2021 2310                    MDM  Number of Diagnoses or Management Options  Fever:   Flu-like symptoms:   Diagnosis management comments: On exam, well-appearing male, no acute distress, nontoxic appearance, febrile, BP elevated but vitals otherwise unremarkable, awake alert and oriented, observed ambulating independently throughout the emergency department without difficulty, no signs of dehydration, lungs clear to auscultation bilaterally, remainder of exam unremarkable as above  Discussed possibility of false negative testing  I advised that the patient perform self quarantine and retest in 4-5 days  Discussed supportive care measures and close PCP follow-up to ensure improvement  Given general appearance, reports that symptoms feel improved, and scope was symptoms, would not pursue any further workup at this time  Amount and/or Complexity of Data Reviewed  Clinical lab tests: reviewed  Decide to obtain previous medical records or to obtain history from someone other than the patient: yes  Obtain history from someone other than the patient: yes  Review and summarize past medical records: yes  Discuss the patient with other providers: yes    Patient Progress  Patient progress: stable (I discussed emergency department return parameters  I answered any and all questions the patient had regarding emergency department course of evaluation and treatment   The patient verbalized understanding of and agreement with plan   )      Disposition  Final diagnoses:   Flu-like symptoms   Fever     Time reflects when diagnosis was documented in both MDM as applicable and the Disposition within this note     Time User Action Codes Description Comment    4/3/2021 11:11 PM Nilda Mita Add [R68 89] Flu-like symptoms     4/3/2021 11:11 PM Nilda Fan Add [R50 9] Fever       ED Disposition     ED Disposition Condition Date/Time Comment    Discharge Stable Sat Apr 3, 2021 11:10 PM Cassy Rizzo discharge to home/self care  Follow-up Information     Follow up With Specialties Details Why Contact Info Additional [address] [facility] Emergency Department Emergency Medicine Go to  If symptoms worsen [address] Emergency Department, [address] [place] [address], [address] [facility] Schedule an appointment as soon as possible for a visit in 1 day For further evaluation [address] [phone]  [place] [address]Shamir Sarmiento [facility], [address] [place], [place], [address] [place] [address]          There are no discharge medications for this patient  No discharge procedures on file      PDMP Review     None          ED Provider  Electronically Signed by           Ruthie Nguyen PA-C  04/04/21 2026

## 2021-04-07 ENCOUNTER — NON-APPOINTMENT (OUTPATIENT)
Age: 41
End: 2021-04-07

## 2021-04-07 PROBLEM — E55.9 VITAMIN D INSUFFICIENCY: Status: ACTIVE | Noted: 2021-04-07

## 2021-04-07 PROBLEM — R79.89 ELEVATED PROLACTIN LEVEL: Status: ACTIVE | Noted: 2021-04-07

## 2021-04-07 LAB
25(OH)D3 SERPL-MCNC: 12.7 NG/ML
ALBUMIN SERPL ELPH-MCNC: 3.7 G/DL
ALP BLD-CCNC: 68 U/L
ALT SERPL-CCNC: 13 U/L
ANION GAP SERPL CALC-SCNC: 12 MMOL/L
AST SERPL-CCNC: 23 U/L
BASOPHILS # BLD AUTO: 0.04 K/UL
BASOPHILS NFR BLD AUTO: 0.6 %
BILIRUB SERPL-MCNC: 0.6 MG/DL
BUN SERPL-MCNC: 15 MG/DL
CALCIUM SERPL-MCNC: 9.5 MG/DL
CHLORIDE SERPL-SCNC: 106 MMOL/L
CHOLEST SERPL-MCNC: 167 MG/DL
CK SERPL-CCNC: 191 U/L
CO2 SERPL-SCNC: 25 MMOL/L
CREAT SERPL-MCNC: 1.12 MG/DL
EOSINOPHIL # BLD AUTO: 0.08 K/UL
EOSINOPHIL NFR BLD AUTO: 1.2 %
ESTIMATED AVERAGE GLUCOSE: 134 MG/DL
ESTRADIOL SERPL-MCNC: 32 PG/ML
FSH SERPL-MCNC: 3.1 IU/L
GLUCOSE SERPL-MCNC: 110 MG/DL
HBA1C MFR BLD HPLC: 6.3 %
HCT VFR BLD CALC: 42 %
HDLC SERPL-MCNC: 38 MG/DL
HGB BLD-MCNC: 12.5 G/DL
IMM GRANULOCYTES NFR BLD AUTO: 0.2 %
LDLC SERPL CALC-MCNC: 108 MG/DL
LH SERPL-ACNC: 3.1 IU/L
LYMPHOCYTES # BLD AUTO: 1.09 K/UL
LYMPHOCYTES NFR BLD AUTO: 16.5 %
MAN DIFF?: NORMAL
MCHC RBC-ENTMCNC: 21.9 PG
MCHC RBC-ENTMCNC: 29.8 GM/DL
MCV RBC AUTO: 73.6 FL
MONOCYTES # BLD AUTO: 0.59 K/UL
MONOCYTES NFR BLD AUTO: 8.9 %
NEUTROPHILS # BLD AUTO: 4.8 K/UL
NEUTROPHILS NFR BLD AUTO: 72.6 %
NONHDLC SERPL-MCNC: 129 MG/DL
PLATELET # BLD AUTO: 180 K/UL
POTASSIUM SERPL-SCNC: 4 MMOL/L
PROLACTIN SERPL-MCNC: 19.6 NG/ML
PROT SERPL-MCNC: 6.8 G/DL
PSA SERPL-MCNC: 0.59 NG/ML
RBC # BLD: 5.71 M/UL
RBC # FLD: 18.7 %
SHBG SERPL-SCNC: 23.3 NMOL/L
SODIUM SERPL-SCNC: 142 MMOL/L
TESTOST BND SERPL-MCNC: 5.07 NG/DL
TESTOST SERPL-MCNC: 153 NG/DL
TESTOSTERONE BIOAVAILABLE: 39 NG/DL
TESTOSTERONE TOTAL S: 169 NG/DL
TRIGL SERPL-MCNC: 105 MG/DL
WBC # FLD AUTO: 6.61 K/UL

## 2021-04-07 RX ORDER — ERGOCALCIFEROL 1.25 MG/1
1.25 MG CAPSULE ORAL
Qty: 8 | Refills: 0 | Status: ACTIVE | COMMUNITY
Start: 2021-04-07 | End: 1900-01-01

## 2021-04-08 LAB
MONOMERIC PROLACTIN (ICMA)*: 14 NG/ML
PERCENT MACROPROLACTIN: 23 %
PROLACTIN, SERUM (ICMA)*: 18.1 NG/ML

## 2021-06-02 RX ORDER — ADHESIVE TAPE 3"X 2.3 YD
50 MCG TAPE, NON-MEDICATED TOPICAL
Qty: 90 | Refills: 0 | Status: ACTIVE | COMMUNITY
Start: 2021-06-02 | End: 1900-01-01

## 2021-06-03 ENCOUNTER — EMERGENCY (EMERGENCY)
Facility: HOSPITAL | Age: 41
LOS: 1 days | Discharge: HOME | End: 2021-06-03
Admitting: EMERGENCY MEDICINE
Payer: COMMERCIAL

## 2021-06-03 VITALS
DIASTOLIC BLOOD PRESSURE: 85 MMHG | TEMPERATURE: 98 F | RESPIRATION RATE: 16 BRPM | HEART RATE: 85 BPM | OXYGEN SATURATION: 98 % | SYSTOLIC BLOOD PRESSURE: 172 MMHG | HEIGHT: 76 IN

## 2021-06-03 PROCEDURE — 73630 X-RAY EXAM OF FOOT: CPT | Mod: 26,RT

## 2021-06-03 PROCEDURE — 99284 EMERGENCY DEPT VISIT MOD MDM: CPT

## 2021-06-03 RX ORDER — INDOMETHACIN 50 MG
50 CAPSULE ORAL ONCE
Refills: 0 | Status: COMPLETED | OUTPATIENT
Start: 2021-06-03 | End: 2021-06-03

## 2021-06-03 RX ORDER — INDOMETHACIN 50 MG
50 CAPSULE ORAL ONCE
Refills: 0 | Status: DISCONTINUED | OUTPATIENT
Start: 2021-06-03 | End: 2021-06-03

## 2021-06-03 RX ADMIN — Medication 50 MILLIGRAM(S): at 22:25

## 2021-06-03 NOTE — ED ADULT TRIAGE NOTE - CHIEF COMPLAINT QUOTE
c/o right toe/foot pain and swelling since Monday, worse today. Denies injury, noted to be swollen and reddened. Pt having difficulty ambulating. Hx of HTN.

## 2021-06-03 NOTE — ED PROVIDER NOTE - OBJECTIVE STATEMENT
40 y/o M h/o HTN, HFrEF p/w R 1st toe pain and swelling x 5 days. Pt reports he has had similar symptoms in the past that usually resolve with ice and rest. Pt states he PCP recently increased dose of Lasix. Pt states this presentation is worse compared to previous bouts. Pt never diagnosed with gout. Pt denies fever, chills, chest pain, sob, cough, abd pain, n/v/d, headache, dizziness.

## 2021-06-03 NOTE — ED PROVIDER NOTE - PROGRESS NOTE DETAILS
Pain improved. XR shows Spurring is noted along the first distal phalanx. No foreign body visualized on physical exam. Given referral to podiatry. Rx sent to pharmacy.

## 2021-06-03 NOTE — ED ADULT NURSE NOTE - OBJECTIVE STATEMENT
patient came to ER with complaint of right foot swelling and pain since Monday. asper pt  he had the same problems before and was resolved by its own.  right foot is swollen. pt has difficulty walking.  seen by pa . awaiting for Xray

## 2021-06-03 NOTE — ED PROVIDER NOTE - PATIENT PORTAL LINK FT
You can access the FollowMyHealth Patient Portal offered by [facility] by registering at the following website: [email or web address]. By joining [employer]’s FollowMyHealth portal, you will also be able to view your health information using other applications (apps) compatible with our system.

## 2021-06-03 NOTE — ED PROVIDER NOTE - CLINICAL SUMMARY MEDICAL DECISION MAKING FREE TEXT BOX
R 1st toe tenderness and edema. consistent with gout flare. possibly 2/2 increase in diuretic   plan  - labs  - NSAIDs  - xr foot

## 2021-06-03 NOTE — ED PROVIDER NOTE - NSFOLLOWUPINSTRUCTIONS_ED_ALL_ED_FT
Follow up with your primary care physician in 48-72 hours- bring copies of your results.  Return to the ER for worsening or persistent symptoms, including but not limited to worsening/persistent pain, shortness of breath, fevers, vomiting, lightheadedness, passing out and/or ANY NEW OR CONCERNING SYMPTOMS. If you have issues obtaining follow up, please call: [phone] to obtain a doctor or specialist who takes your insurance in your area.  You may call [phone] to make an appointment with the internal medicine clinic.

## 2021-06-03 NOTE — ED PROVIDER NOTE - CARE PROVIDER_API CALL
Rubia Wayne  FOOT AND ANKLE SURGERY  [address] [facility] [place], [address]  [place] [address]  Phone: [phone]  Fax: [phone]  Follow Up Time: Urgent

## 2021-06-04 VITALS
HEART RATE: 84 BPM | OXYGEN SATURATION: 98 % | DIASTOLIC BLOOD PRESSURE: 71 MMHG | RESPIRATION RATE: 16 BRPM | TEMPERATURE: 99 F | SYSTOLIC BLOOD PRESSURE: 153 MMHG

## 2021-06-04 LAB
ALBUMIN SERPL ELPH-MCNC: 4 G/DL — SIGNIFICANT CHANGE UP (ref 3.3–5)
ALP SERPL-CCNC: 77 U/L — SIGNIFICANT CHANGE UP (ref 40–120)
ALT FLD-CCNC: 7 U/L — SIGNIFICANT CHANGE UP (ref 4–41)
ANION GAP SERPL CALC-SCNC: 13 MMOL/L — SIGNIFICANT CHANGE UP (ref 7–14)
ANISOCYTOSIS BLD QL: SIGNIFICANT CHANGE UP
AST SERPL-CCNC: 15 U/L — SIGNIFICANT CHANGE UP (ref 4–40)
BASOPHILS # BLD AUTO: 0 K/UL — SIGNIFICANT CHANGE UP (ref 0–0.2)
BASOPHILS NFR BLD AUTO: 0 % — SIGNIFICANT CHANGE UP (ref 0–2)
BILIRUB SERPL-MCNC: 0.5 MG/DL — SIGNIFICANT CHANGE UP (ref 0.2–1.2)
BUN SERPL-MCNC: 17 MG/DL — SIGNIFICANT CHANGE UP (ref 7–23)
CALCIUM SERPL-MCNC: 9.1 MG/DL — SIGNIFICANT CHANGE UP (ref 8.4–10.5)
CHLORIDE SERPL-SCNC: 104 MMOL/L — SIGNIFICANT CHANGE UP (ref 98–107)
CO2 SERPL-SCNC: 23 MMOL/L — SIGNIFICANT CHANGE UP (ref 22–31)
CREAT SERPL-MCNC: 1.26 MG/DL — SIGNIFICANT CHANGE UP (ref 0.5–1.3)
EOSINOPHIL # BLD AUTO: 0.08 K/UL — SIGNIFICANT CHANGE UP (ref 0–0.5)
EOSINOPHIL NFR BLD AUTO: 0.9 % — SIGNIFICANT CHANGE UP (ref 0–6)
GIANT PLATELETS BLD QL SMEAR: PRESENT — SIGNIFICANT CHANGE UP
GLUCOSE SERPL-MCNC: 114 MG/DL — HIGH (ref 70–99)
HCT VFR BLD CALC: 40.5 % — SIGNIFICANT CHANGE UP (ref 39–50)
HGB BLD-MCNC: 12.5 G/DL — LOW (ref 13–17)
IANC: 6.32 K/UL — SIGNIFICANT CHANGE UP (ref 1.5–8.5)
LYMPHOCYTES # BLD AUTO: 1.67 K/UL — SIGNIFICANT CHANGE UP (ref 1–3.3)
LYMPHOCYTES # BLD AUTO: 18.6 % — SIGNIFICANT CHANGE UP (ref 13–44)
MANUAL SMEAR VERIFICATION: SIGNIFICANT CHANGE UP
MCHC RBC-ENTMCNC: 22.6 PG — LOW (ref 27–34)
MCHC RBC-ENTMCNC: 30.9 GM/DL — LOW (ref 32–36)
MCV RBC AUTO: 73.1 FL — LOW (ref 80–100)
MICROCYTES BLD QL: SIGNIFICANT CHANGE UP
MONOCYTES # BLD AUTO: 0.72 K/UL — SIGNIFICANT CHANGE UP (ref 0–0.9)
MONOCYTES NFR BLD AUTO: 8 % — SIGNIFICANT CHANGE UP (ref 2–14)
MYELOCYTES NFR BLD: 0.9 % — HIGH (ref 0–0)
NEUTROPHILS # BLD AUTO: 6.2 K/UL — SIGNIFICANT CHANGE UP (ref 1.8–7.4)
NEUTROPHILS NFR BLD AUTO: 69 % — SIGNIFICANT CHANGE UP (ref 43–77)
PLAT MORPH BLD: ABNORMAL
PLATELET # BLD AUTO: 181 K/UL — SIGNIFICANT CHANGE UP (ref 150–400)
PLATELET COUNT - ESTIMATE: NORMAL — SIGNIFICANT CHANGE UP
POLYCHROMASIA BLD QL SMEAR: SLIGHT — SIGNIFICANT CHANGE UP
POTASSIUM SERPL-MCNC: 3.7 MMOL/L — SIGNIFICANT CHANGE UP (ref 3.5–5.3)
POTASSIUM SERPL-SCNC: 3.7 MMOL/L — SIGNIFICANT CHANGE UP (ref 3.5–5.3)
PROT SERPL-MCNC: 7.7 G/DL — SIGNIFICANT CHANGE UP (ref 6–8.3)
RBC # BLD: 5.54 M/UL — SIGNIFICANT CHANGE UP (ref 4.2–5.8)
RBC # FLD: 19.7 % — HIGH (ref 10.3–14.5)
RBC BLD AUTO: ABNORMAL
SMUDGE CELLS # BLD: PRESENT — SIGNIFICANT CHANGE UP
SODIUM SERPL-SCNC: 140 MMOL/L — SIGNIFICANT CHANGE UP (ref 135–145)
URATE SERPL-MCNC: 9.9 MG/DL — HIGH (ref 3.4–8.8)
VARIANT LYMPHS # BLD: 2.6 % — SIGNIFICANT CHANGE UP (ref 0–6)
WBC # BLD: 8.98 K/UL — SIGNIFICANT CHANGE UP (ref 3.8–10.5)
WBC # FLD AUTO: 8.98 K/UL — SIGNIFICANT CHANGE UP (ref 3.8–10.5)

## 2021-08-16 NOTE — PROGRESS NOTE ADULT - PROBLEM SELECTOR PLAN 2
c/w coreg / Lisinopril/ Lasix Advancement Flap (Single) Text: The defect edges were debeveled with a #15 scalpel blade.  Given the location of the defect and the proximity to free margins a single advancement flap was deemed most appropriate.  Using a sterile surgical marker, an appropriate advancement flap was drawn incorporating the defect and placing the expected incisions within the relaxed skin tension lines where possible.    The area thus outlined was incised deep to adipose tissue with a #15 scalpel blade.  The skin margins were undermined to an appropriate distance in all directions utilizing iris scissors.

## 2021-12-08 ENCOUNTER — NON-APPOINTMENT (OUTPATIENT)
Age: 41
End: 2021-12-08

## 2022-02-10 ENCOUNTER — APPOINTMENT (OUTPATIENT)
Dept: GASTROENTEROLOGY | Facility: CLINIC | Age: 42
End: 2022-02-10

## 2022-03-09 NOTE — ASU PATIENT PROFILE, ADULT - FALL HARM RISK - UNIVERSAL INTERVENTIONS
Bed in lowest position, wheels locked, appropriate side rails in place/Call bell, personal items and telephone in reach/Instruct patient to call for assistance before getting out of bed or chair/Non-slip footwear when patient is out of bed/[place] to call system/Physically safe environment - no spills, clutter or unnecessary equipment/Purposeful Proactive Rounding/Room/bathroom lighting operational, light cord in reach

## 2022-03-09 NOTE — ASU PATIENT PROFILE, ADULT - NS PRO TALK SOMEONE YN
Problem: PHYSICAL THERAPY ADULT  Goal: Performs mobility at highest level of function for planned discharge setting  See evaluation for individualized goals  Description  Treatment/Interventions: Functional transfer training, LE strengthening/ROM, Therapeutic exercise, Elevations, Endurance training, Patient/family training, Equipment eval/education, Bed mobility, Gait training, Compensatory technique education, Continued evaluation, OT  Equipment Recommended: Nyra Deal       See flowsheet documentation for full assessment, interventions and recommendations  3/25/2020 0680 by Tico Guajardo PTA  Outcome: Progressing  Note:   Prognosis: Good  Problem List: Decreased strength, Decreased endurance, Impaired balance, Decreased mobility, Decreased coordination, Decreased cognition, Decreased safety awareness, Impaired judgement, Impaired sensation  Assessment: Pt seen for PT interventions as per PT POC  Pt continues to demonstrates l sided lean with dynamic sitting balance activities and upon seated upright position at edge of bed  Requiring verbal and tactile cues and min assist at times to correct  Pt  Demonstrating improved L  on walker NO assist required to maintain L  or for repositioning verbal reminders required only  Verbal cues for improved upright posture  Less assistance for sit to stand requiring min assist x2 and verbal cues for R handplacement required during sit to stand transfers, pt progressed with ambulation distances to 36' x2 with mod assist x1 standby assist of another for safety and cues to move over as pt veering toward the L  Pt continues to demonstrate ataxia of L le and NBOS , decreased l foot clearance, mild L lean and slower gait as pt becomes fatigued  Pt performed seated and standing b/l le arom exercises and weightshifting in standing  Pt Requires verbal cues to perform exercises slowly for slow controlled movements  L ue ataxia noted with reaching activities    Pt remained seated out of bed in recliner with SCD's to b/l le's and chair alarm activated  Continue to recommend STR at d/c    Barriers to Discharge: Inaccessible home environment, Decreased caregiver support  Barriers to Discharge Comments: +steps, lives alone  Recommendation: Short-term skilled PT     PT - OK to Discharge: Yes    See flowsheet documentation for full assessment  no

## 2022-03-10 ENCOUNTER — OUTPATIENT (OUTPATIENT)
Dept: OUTPATIENT SERVICES | Facility: HOSPITAL | Age: 42
LOS: 1 days | Discharge: HOME | End: 2022-03-10
Payer: COMMERCIAL

## 2022-03-10 ENCOUNTER — RESULT REVIEW (OUTPATIENT)
Age: 42
End: 2022-03-10

## 2022-03-10 VITALS
WEIGHT: 315 LBS | HEART RATE: 62 BPM | SYSTOLIC BLOOD PRESSURE: 163 MMHG | RESPIRATION RATE: 20 BRPM | OXYGEN SATURATION: 97 % | TEMPERATURE: 98 F | HEIGHT: 76 IN | DIASTOLIC BLOOD PRESSURE: 85 MMHG

## 2022-03-10 VITALS
HEART RATE: 60 BPM | SYSTOLIC BLOOD PRESSURE: 138 MMHG | RESPIRATION RATE: 22 BRPM | DIASTOLIC BLOOD PRESSURE: 68 MMHG | OXYGEN SATURATION: 98 %

## 2022-03-10 DIAGNOSIS — E66.9 OBESITY, UNSPECIFIED: ICD-10-CM

## 2022-03-10 PROCEDURE — 88305 TISSUE EXAM BY PATHOLOGIST: CPT | Mod: 26

## 2022-03-14 LAB — SURGICAL PATHOLOGY STUDY: SIGNIFICANT CHANGE UP

## 2022-04-06 ENCOUNTER — OUTPATIENT (OUTPATIENT)
Dept: OUTPATIENT SERVICES | Facility: HOSPITAL | Age: 42
LOS: 1 days | End: 2022-04-06

## 2022-04-06 ENCOUNTER — TRANSCRIPTION ENCOUNTER (OUTPATIENT)
Age: 42
End: 2022-04-06

## 2022-04-06 VITALS
DIASTOLIC BLOOD PRESSURE: 90 MMHG | HEART RATE: 60 BPM | RESPIRATION RATE: 16 BRPM | SYSTOLIC BLOOD PRESSURE: 160 MMHG | OXYGEN SATURATION: 99 % | WEIGHT: 315 LBS | HEIGHT: 76 IN | TEMPERATURE: 99 F

## 2022-04-06 DIAGNOSIS — I50.9 HEART FAILURE, UNSPECIFIED: ICD-10-CM

## 2022-04-06 DIAGNOSIS — Z20.822 CONTACT WITH AND (SUSPECTED) EXPOSURE TO COVID-19: ICD-10-CM

## 2022-04-06 DIAGNOSIS — Z20.828 CONTACT WITH AND (SUSPECTED) EXPOSURE TO OTHER VIRAL COMMUNICABLE DISEASES: ICD-10-CM

## 2022-04-06 DIAGNOSIS — K29.50 UNSPECIFIED CHRONIC GASTRITIS WITHOUT BLEEDING: ICD-10-CM

## 2022-04-06 DIAGNOSIS — K44.9 DIAPHRAGMATIC HERNIA WITHOUT OBSTRUCTION OR GANGRENE: ICD-10-CM

## 2022-04-06 DIAGNOSIS — E66.9 OBESITY, UNSPECIFIED: ICD-10-CM

## 2022-04-06 DIAGNOSIS — E66.01 MORBID (SEVERE) OBESITY DUE TO EXCESS CALORIES: ICD-10-CM

## 2022-04-06 DIAGNOSIS — K20.90 ESOPHAGITIS, UNSPECIFIED WITHOUT BLEEDING: ICD-10-CM

## 2022-04-06 LAB
A1C WITH ESTIMATED AVERAGE GLUCOSE RESULT: 6.3 % — HIGH (ref 4–5.6)
ANION GAP SERPL CALC-SCNC: 13 MMOL/L — SIGNIFICANT CHANGE UP (ref 7–14)
BUN SERPL-MCNC: 12 MG/DL — SIGNIFICANT CHANGE UP (ref 7–23)
CALCIUM SERPL-MCNC: 9 MG/DL — SIGNIFICANT CHANGE UP (ref 8.4–10.5)
CHLORIDE SERPL-SCNC: 104 MMOL/L — SIGNIFICANT CHANGE UP (ref 98–107)
CO2 SERPL-SCNC: 25 MMOL/L — SIGNIFICANT CHANGE UP (ref 22–31)
CREAT SERPL-MCNC: 1.05 MG/DL — SIGNIFICANT CHANGE UP (ref 0.5–1.3)
EGFR: 91 ML/MIN/1.73M2 — SIGNIFICANT CHANGE UP
ESTIMATED AVERAGE GLUCOSE: 134 — SIGNIFICANT CHANGE UP
GLUCOSE SERPL-MCNC: 101 MG/DL — HIGH (ref 70–99)
HCT VFR BLD CALC: 48.3 % — SIGNIFICANT CHANGE UP (ref 39–50)
HGB BLD-MCNC: 14.9 G/DL — SIGNIFICANT CHANGE UP (ref 13–17)
MCHC RBC-ENTMCNC: 23.5 PG — LOW (ref 27–34)
MCHC RBC-ENTMCNC: 30.8 GM/DL — LOW (ref 32–36)
MCV RBC AUTO: 76.3 FL — LOW (ref 80–100)
NRBC # BLD: 0 /100 WBCS — SIGNIFICANT CHANGE UP
NRBC # FLD: 0 K/UL — SIGNIFICANT CHANGE UP
PLATELET # BLD AUTO: 174 K/UL — SIGNIFICANT CHANGE UP (ref 150–400)
POTASSIUM SERPL-MCNC: 3.7 MMOL/L — SIGNIFICANT CHANGE UP (ref 3.5–5.3)
POTASSIUM SERPL-SCNC: 3.7 MMOL/L — SIGNIFICANT CHANGE UP (ref 3.5–5.3)
RBC # BLD: 6.33 M/UL — HIGH (ref 4.2–5.8)
RBC # FLD: 19.1 % — HIGH (ref 10.3–14.5)
SODIUM SERPL-SCNC: 142 MMOL/L — SIGNIFICANT CHANGE UP (ref 135–145)
WBC # BLD: 6.89 K/UL — SIGNIFICANT CHANGE UP (ref 3.8–10.5)
WBC # FLD AUTO: 6.89 K/UL — SIGNIFICANT CHANGE UP (ref 3.8–10.5)

## 2022-04-06 RX ORDER — FERROUS SULFATE 325(65) MG
1 TABLET ORAL
Qty: 30 | Refills: 0

## 2022-04-06 NOTE — H&P PST ADULT - PROBLEM SELECTOR PLAN 2
Patient with h/o CHF. Last Cardiology note and echo results in chart.  Case discussed with Dr Whitney. No further evaluation needed at this time.

## 2022-04-06 NOTE — ASU PATIENT PROFILE, ADULT - FALL HARM RISK - UNIVERSAL INTERVENTIONS
Bed in lowest position, wheels locked, appropriate side rails in place/Call bell, personal items and telephone in reach/Instruct patient to call for assistance before getting out of bed or chair/Non-slip footwear when patient is out of bed/[place] to call system/Physically safe environment - no spills, clutter or unnecessary equipment/Purposeful Proactive Rounding/Room/bathroom lighting operational, light cord in reach

## 2022-04-06 NOTE — H&P PST ADULT - PROBLEM SELECTOR PLAN 1
Patient tentatively scheduled for Endoscopy on 4/7/22.  Pre-op instructions provided. Pt given verbal and written instructions with teach back on pepcid. Pt verbalized understanding with return demonstration.   Covid testing scheduled prior to surgery as per patient.  JACEK precautions,

## 2022-04-06 NOTE — H&P PST ADULT - HISTORY OF PRESENT ILLNESS
41 year old male with PMH of HTN, HLD, prediabetes ,CHF presents to Presurgical testing with diagnosis of unspecified chronic gastritis without bleeding  scheduled for Endoscopy.

## 2022-04-06 NOTE — H&P PST ADULT - NEGATIVE GASTROINTESTINAL SYMPTOMS
preop dx: unspecified chronic gastritis without bleeding/no nausea/no vomiting/no diarrhea/no constipation

## 2022-04-06 NOTE — ASU PATIENT PROFILE, ADULT - NS PREOP UNDERSTANDS INFO
24hr Events:  O/N: SONIA, VSS  7/13: Pt recs home with no needs; started flagyl due to Bacteroides in culture. Potential DC tomorrow.        Assessment/Plan:  81F with PMH of HTN, PAD (s/p LLE BK Pop to DP bypass with rGSV on 5/7/20) admitted for a left thigh abscess, s/p left thigh I&D with washout and wound VAC placement on 7/10/20    - Home medications   - IV Abx -Bactrim/flagyl  - COVID negative  - DASH Diet  - AM labs  - Wound VAC to left thigh M-W-F change yes 24hr Events:  O/N: FAINA SCOTT  7/13: Pt recs home with no needs; started flagyl due to Bacteroides in culture. Potential DC tomorrow.    metroNIDAZOLE    Tablet 500  trimethoprim  160 mG/sulfamethoxazole 800 mG 1  aspirin enteric coated 81  clopidogrel Tablet 75  heparin   Injectable 5000  hydrochlorothiazide 12.5  losartan 100  metroNIDAZOLE    Tablet 500  trimethoprim  160 mG/sulfamethoxazole 800 mG 1        Vital Signs Last 24 Hrs  T(C): 36.3 (14 Jul 2020 05:20), Max: 36.8 (13 Jul 2020 14:00)  T(F): 97.4 (14 Jul 2020 05:20), Max: 98.3 (13 Jul 2020 14:00)  HR: 72 (14 Jul 2020 05:00) (68 - 96)  BP: 148/74 (14 Jul 2020 05:00) (118/71 - 161/69)  BP(mean): 101 (14 Jul 2020 05:00) (88 - 101)  RR: 18 (14 Jul 2020 05:00) (14 - 18)  SpO2: 95% (14 Jul 2020 05:00) (94% - 100%)  I&O's Summary    13 Jul 2020 07:01  -  14 Jul 2020 07:00  --------------------------------------------------------  IN: 0 mL / OUT: 1750 mL / NET: -1750 mL        Physical Exam:  General: NAD, resting comfortably in bed  Pulmonary: Nonlabored breathing, no respiratory distress  Abdominal: soft, NT/ND  Extremities: left thigh with wound VAC in place with good seal, no surrounding erythema/edema       LABS:                        9.9    7.19  )-----------( 242      ( 13 Jul 2020 05:56 )             31.6     07-13    144  |  111<H>  |  13  ----------------------------<  102<H>  4.2   |  23  |  0.92    Ca    9.4      13 Jul 2020 05:56  Phos  2.6     07-13  Mg     2.0     07-13      PLEASE CHECK WHEN PRESENT:     [  ]Heart Failure     [  ] Acute     [  ] Acute on Chronic     [  ] Chronic  -------------------------------------------------------------------     [  ]Diastolic [HFpEF]     [  ]Systolic [HFrEF]     [  ]Combined [HFpEF & HFrEF]  .................................................................................     [  ]Other:     [ ] Pulmonary Hypertension     [ ] Afib     [ x] Hypertensive Heart Disease  -------------------------------------------------------------------  [ ] Respiratory failure  [ ] Acute cor pulmonale  [ ] Asthma/COPD Exacerbation  [ ] Pleural effusion  [ ] Aspiration pneumonia  [ ] Obstructive Sleep Apnea  -------------------------------------------------------------------  [  ]MANOJ     [  ]ATN     [  ]Reneal Medullary Necrosis     [  ]Renal Cortical Necrosis     [  ]Other Pathological Lesions:    [  ]CKD 1  [  ]CKD 2  [  ]CKD 3  [  ]CKD 4  [  ]CKD 5  [  ]Other  -------------------------------------------------------------------  [  ]Diabetes  [  ] Diabetic PVD Ulcer  [  ] Neuropathic ulcer to DM  [  ] Diabetes with Nephropathy  [  ] Osteomyelitis due to diabetes  --------------------------------------------------------------------  [  ]Malnutrition: See Nutrition Note  [  ]Cachexia  [  ]Other:   [  ]Supplement Ordered:  [  ]Morbid Obesity (BMI >=40]  ---------------------------------------------------------------------  [ ] Sepsis/severe sepsis/septic shock  [ ] Noninfectious SIRS  [ ] UTI  [ ] Pneumonia  -----------------------------------------------------------------------  [ ] Acidosis/alkalosis  [ ] Fluid overload  [ ] Hypokalemia  [ ] Hyperkalemia  [ ] Hypomagnesemia  [ ] Hypophosphatemia  [ ] Hyperphosphatemia  ------------------------------------------------------------------------  [ ] Acute blood loss anemia  [ ] Post op blood loss anemia  [ ] Iron deficiency anemia  [ ] Anemia due to chronic disease  [ ] Hypercoagulable state  [ ] Thrombocytopenia  ----------------------------------------------------------------------  [ ] Cerebral infarction  [ ] Transient ischemia attack  [ ] Encephalopathy - Toxic or Metabolic    Assessment/Plan:  81F with PMH of HTN, PAD (s/p LLE BK Pop to DP bypass with rGSV on 5/7/20) admitted for a left thigh abscess, s/p left thigh I&D with washout and wound VAC placement on 7/10/20    - Home medications   - IV Abx -Bactrim/flagyl  - COVID negative  - DASH Diet  - AM labs  - Wound VAC to left thigh M-W-F change

## 2022-04-06 NOTE — H&P PST ADULT - NSANTHOSAYNRD_GEN_A_CORE
No. JACEK screening performed.  STOP BANG Legend: 0-2 = LOW Risk; 3-4 = INTERMEDIATE Risk; 5-8 = HIGH Risk

## 2022-04-06 NOTE — H&P PST ADULT - NSICDXPASTMEDICALHX_GEN_ALL_CORE_FT
PAST MEDICAL HISTORY:  Congestive heart failure     HLD (hyperlipidemia)     HTN (hypertension)

## 2022-04-07 ENCOUNTER — OUTPATIENT (OUTPATIENT)
Dept: OUTPATIENT SERVICES | Facility: HOSPITAL | Age: 42
LOS: 1 days | Discharge: HOME | End: 2022-04-07
Payer: COMMERCIAL

## 2022-04-07 ENCOUNTER — RESULT REVIEW (OUTPATIENT)
Age: 42
End: 2022-04-07

## 2022-04-07 VITALS
TEMPERATURE: 97 F | OXYGEN SATURATION: 94 % | WEIGHT: 315 LBS | HEART RATE: 53 BPM | DIASTOLIC BLOOD PRESSURE: 92 MMHG | SYSTOLIC BLOOD PRESSURE: 141 MMHG | RESPIRATION RATE: 16 BRPM | HEIGHT: 76 IN

## 2022-04-07 VITALS
DIASTOLIC BLOOD PRESSURE: 75 MMHG | HEART RATE: 53 BPM | RESPIRATION RATE: 17 BRPM | SYSTOLIC BLOOD PRESSURE: 125 MMHG | OXYGEN SATURATION: 100 %

## 2022-04-07 DIAGNOSIS — E66.9 OBESITY, UNSPECIFIED: ICD-10-CM

## 2022-04-07 PROCEDURE — 88305 TISSUE EXAM BY PATHOLOGIST: CPT | Mod: 26

## 2022-04-07 NOTE — ASU PREOP CHECKLIST - HEIGHT IN CM
Render Post-Care Instructions In Note?: no
Post-Care Instructions: I reviewed with the patient in detail post-care instructions. Patient is to wear sunprotection, and avoid picking at any of the treated lesions. Pt may apply Vaseline to crusted or scabbing areas.
193.04
Consent: The patient's consent was obtained including but not limited to risks of crusting, scabbing, blistering, scarring, darker or lighter pigmentary change, recurrence, incomplete removal and infection.
Duration Of Freeze Thaw-Cycle (Seconds): 5
Medical Necessity Information: It is in your best interest to select a reason for this procedure from the list below. All of these items fulfill various CMS LCD requirements except the new and changing color options.
Number Of Freeze-Thaw Cycles: 1 freeze-thaw cycle
Detail Level: Detailed
Medical Necessity Clause: This procedure was medically necessary because the lesions that were treated were:

## 2022-04-12 LAB — SURGICAL PATHOLOGY STUDY: SIGNIFICANT CHANGE UP

## 2022-10-01 ENCOUNTER — EMERGENCY (EMERGENCY)
Facility: HOSPITAL | Age: 42
LOS: 1 days | Discharge: LEFT BEFORE DISCHARGE | End: 2022-10-01
Attending: EMERGENCY MEDICINE | Admitting: EMERGENCY MEDICINE

## 2022-10-01 VITALS
DIASTOLIC BLOOD PRESSURE: 96 MMHG | SYSTOLIC BLOOD PRESSURE: 174 MMHG | HEART RATE: 58 BPM | HEIGHT: 76 IN | TEMPERATURE: 98 F | OXYGEN SATURATION: 99 % | RESPIRATION RATE: 14 BRPM

## 2022-10-01 LAB
ALBUMIN SERPL ELPH-MCNC: 4.1 G/DL — SIGNIFICANT CHANGE UP (ref 3.3–5)
ALP SERPL-CCNC: 60 U/L — SIGNIFICANT CHANGE UP (ref 40–120)
ALT FLD-CCNC: 10 U/L — SIGNIFICANT CHANGE UP (ref 4–41)
ANION GAP SERPL CALC-SCNC: 10 MMOL/L — SIGNIFICANT CHANGE UP (ref 7–14)
APPEARANCE UR: CLEAR — SIGNIFICANT CHANGE UP
AST SERPL-CCNC: 17 U/L — SIGNIFICANT CHANGE UP (ref 4–40)
BASOPHILS # BLD AUTO: 0.05 K/UL — SIGNIFICANT CHANGE UP (ref 0–0.2)
BASOPHILS NFR BLD AUTO: 0.6 % — SIGNIFICANT CHANGE UP (ref 0–2)
BILIRUB SERPL-MCNC: 0.5 MG/DL — SIGNIFICANT CHANGE UP (ref 0.2–1.2)
BILIRUB UR-MCNC: NEGATIVE — SIGNIFICANT CHANGE UP
BUN SERPL-MCNC: 10 MG/DL — SIGNIFICANT CHANGE UP (ref 7–23)
CALCIUM SERPL-MCNC: 8.8 MG/DL — SIGNIFICANT CHANGE UP (ref 8.4–10.5)
CHLORIDE SERPL-SCNC: 104 MMOL/L — SIGNIFICANT CHANGE UP (ref 98–107)
CO2 SERPL-SCNC: 25 MMOL/L — SIGNIFICANT CHANGE UP (ref 22–31)
COLOR SPEC: SIGNIFICANT CHANGE UP
CREAT SERPL-MCNC: 0.98 MG/DL — SIGNIFICANT CHANGE UP (ref 0.5–1.3)
DIFF PNL FLD: NEGATIVE — SIGNIFICANT CHANGE UP
EGFR: 99 ML/MIN/1.73M2 — SIGNIFICANT CHANGE UP
EOSINOPHIL # BLD AUTO: 0.12 K/UL — SIGNIFICANT CHANGE UP (ref 0–0.5)
EOSINOPHIL NFR BLD AUTO: 1.4 % — SIGNIFICANT CHANGE UP (ref 0–6)
GLUCOSE SERPL-MCNC: 99 MG/DL — SIGNIFICANT CHANGE UP (ref 70–99)
GLUCOSE UR QL: NEGATIVE — SIGNIFICANT CHANGE UP
HCT VFR BLD CALC: 44.4 % — SIGNIFICANT CHANGE UP (ref 39–50)
HGB BLD-MCNC: 13.6 G/DL — SIGNIFICANT CHANGE UP (ref 13–17)
IANC: 5.29 K/UL — SIGNIFICANT CHANGE UP (ref 1.8–7.4)
IMM GRANULOCYTES NFR BLD AUTO: 0.2 % — SIGNIFICANT CHANGE UP (ref 0–0.9)
KETONES UR-MCNC: NEGATIVE — SIGNIFICANT CHANGE UP
LEUKOCYTE ESTERASE UR-ACNC: NEGATIVE — SIGNIFICANT CHANGE UP
LYMPHOCYTES # BLD AUTO: 2.23 K/UL — SIGNIFICANT CHANGE UP (ref 1–3.3)
LYMPHOCYTES # BLD AUTO: 26.7 % — SIGNIFICANT CHANGE UP (ref 13–44)
MCHC RBC-ENTMCNC: 23.2 PG — LOW (ref 27–34)
MCHC RBC-ENTMCNC: 30.6 GM/DL — LOW (ref 32–36)
MCV RBC AUTO: 75.9 FL — LOW (ref 80–100)
MONOCYTES # BLD AUTO: 0.65 K/UL — SIGNIFICANT CHANGE UP (ref 0–0.9)
MONOCYTES NFR BLD AUTO: 7.8 % — SIGNIFICANT CHANGE UP (ref 2–14)
NEUTROPHILS # BLD AUTO: 5.29 K/UL — SIGNIFICANT CHANGE UP (ref 1.8–7.4)
NEUTROPHILS NFR BLD AUTO: 63.3 % — SIGNIFICANT CHANGE UP (ref 43–77)
NITRITE UR-MCNC: NEGATIVE — SIGNIFICANT CHANGE UP
NRBC # BLD: 0 /100 WBCS — SIGNIFICANT CHANGE UP (ref 0–0)
NRBC # FLD: 0 K/UL — SIGNIFICANT CHANGE UP (ref 0–0)
NT-PROBNP SERPL-SCNC: 790 PG/ML — HIGH
PH UR: 7.5 — SIGNIFICANT CHANGE UP (ref 5–8)
PLATELET # BLD AUTO: 199 K/UL — SIGNIFICANT CHANGE UP (ref 150–400)
POTASSIUM SERPL-MCNC: 4 MMOL/L — SIGNIFICANT CHANGE UP (ref 3.5–5.3)
POTASSIUM SERPL-SCNC: 4 MMOL/L — SIGNIFICANT CHANGE UP (ref 3.5–5.3)
PROT SERPL-MCNC: 7.4 G/DL — SIGNIFICANT CHANGE UP (ref 6–8.3)
PROT UR-MCNC: ABNORMAL
RBC # BLD: 5.85 M/UL — HIGH (ref 4.2–5.8)
RBC # FLD: 16.9 % — HIGH (ref 10.3–14.5)
SODIUM SERPL-SCNC: 139 MMOL/L — SIGNIFICANT CHANGE UP (ref 135–145)
SP GR SPEC: 1.01 — SIGNIFICANT CHANGE UP (ref 1.01–1.05)
UROBILINOGEN FLD QL: SIGNIFICANT CHANGE UP
WBC # BLD: 8.36 K/UL — SIGNIFICANT CHANGE UP (ref 3.8–10.5)
WBC # FLD AUTO: 8.36 K/UL — SIGNIFICANT CHANGE UP (ref 3.8–10.5)

## 2022-10-01 PROCEDURE — 93010 ELECTROCARDIOGRAM REPORT: CPT

## 2022-10-01 PROCEDURE — 70496 CT ANGIOGRAPHY HEAD: CPT | Mod: 26,MA

## 2022-10-01 PROCEDURE — 70498 CT ANGIOGRAPHY NECK: CPT | Mod: 26,MA

## 2022-10-01 PROCEDURE — 99285 EMERGENCY DEPT VISIT HI MDM: CPT

## 2022-10-01 NOTE — ED PROVIDER NOTE - OBJECTIVE STATEMENT
This is a 42 yr M, pmh htn, hld, chf with sudden onset of numbness from elbow till fingerprints, with sensory deficits started about 4 am. [place] to work and as the day progress his sensory deficits got better but numbness to finger prints still there. Denies any fever, chills, sob, headache, dizziness, light headedness, vision changes, n, v, slurred speech.

## 2022-10-01 NOTE — ED ADULT NURSE NOTE - OBJECTIVE STATEMENT
a&ox4, c/o rt lower arm and finger numbness since this am that is improving. denies weakness.  resp even and unlabored. well appearing. no neuro deficit noted.

## 2022-10-01 NOTE — ED PROVIDER NOTE - PROGRESS NOTE DETAILS
symptomatically improved, sitting comfortably in bed. signed out to overnight team,pending cta results. MD CHO:  I received s/o on this pt from Dr. Bradley.  Plan:  f/u cta rds.  Spoke with radiology, there may be a R Vert Art dissection, but difficult to be certain.  Recommend MRA neck for further evaluation.  JOSE EDUARDO Zamudio to discuss with pt if he is willing to have to the study performed here in the ED as recommended. JOSE EDUARDO Zamudio Addendum-----I rec'd sign-out on this patient; case discussed; pt was pending CTA head/neck imaging.  In interim, pt objectively noted to be resting comfortably.  Dr. Elena contacted me regarding CTA radiology findings; as per radiology report posted: ".....IMPRESSION:  CT HEAD: No acute intracranial CT abnormality.  CTA BRAIN/NECK:  1. Abnormal appearance of the right vertebral artery with crescentic   shape peripheral thrombus in the cervical portion of the level of C3. In addition the V4 segment of the right vertebral artery is not well seen.  An underlying dissection cannot be excluded. An MRA of the neck with T1 fat sat imaging is advised....".  I reviewed these results with patient; MRA advised and strongly recommended to patient.  I explained what a dissection is, explained that MRA study will better assess the region in question.  I explained that if a dissection could have potentially life-threatening effects, including but not limited to stroke, disability, death.  Patient verbalized understanding but stated he is tired, he has been up all day and wants to go home.  I explained to patient that we are advising against  him going home; that this finding on CT needs to be urgently further evaluated, but pt states he understands but does not want to stay.  I told patient that we would have him sign out against medical advice but that if patient changes his mind at any point, we would encourage he return immediately to the ED for continued evaluation; pt verbalizes understanding.  At the very least, I informed pt he should follow up with his PMD ASAP and obtain MRA on urgent outpatient basis through his PMD; pt verbalized understanding.  Medication compliance (pt takes meds for HTN) is encouraged to patient.  Patient stated he missed his doses of his BP meds as he has been in the ED; I offered to give patient doses of his home meds before he leaves and asked what meds he takes; pt states "its ok; I'll take them at home".  All of the above discussed with ED attending Dr. Elena.  Patient will be signed out AMA.

## 2022-10-01 NOTE — ED PROVIDER NOTE - CLINICAL SUMMARY MEDICAL DECISION MAKING FREE TEXT BOX
This is a 42 yr M, pmh htn, hld, chf with sudden onset of numbness from elbow till fingerprints, with sensory deficits started about 4 am. [place] to work and as the day progress his sensory deficits got better but numbness to finger prints still there. Denies any fever, chills, sob, headache, dizziness, light headedness, vision changes, n, v, slurred speech. Denies trauma, fall.   labs, bnp, ct head non con r/o not limited to stroke

## 2022-10-01 NOTE — ED PROVIDER NOTE - CARE PLAN
1 Principal Discharge DX:	Paresthesia   Principal Discharge DX:	Paresthesia  Secondary Diagnosis:	Abnormal CT scan

## 2022-10-01 NOTE — ED PROVIDER NOTE - PELVIS
Anesthesia Volume In Cc: 0.5 Post-Care Instructions: I reviewed with the patient in detail post-care instructions. Patient is to wear sunprotection, and avoid picking at any of the treated lesions. Pt may apply Vaseline to crusted or scabbing areas. Treatment Number (Will Not Render If 0): 0 Detail Level: Detailed Render Post-Care Instructions In Note?: no Medical Necessity Clause: This procedure was medically necessary because the lesions that were treated were: Medical Necessity Information: It is in your best interest to select a reason for this procedure from the list below. All of these items fulfill various CMS LCD requirements except the new and changing color options. Consent: The patient's consent was obtained including but not limited to risks of crusting, scabbing, blistering, scarring, darker or lighter pigmentary change, recurrence, incomplete removal and infection. stable

## 2022-10-01 NOTE — ED PROVIDER NOTE - NS ED ATTENDING STATEMENT MOD
This was a shared visit with the LOIDA. I reviewed and verified the documentation and independently performed the documented:

## 2022-10-01 NOTE — ED ADULT TRIAGE NOTE - CHIEF COMPLAINT QUOTE
pt ambulatory to walk in triage c/o right arm numbness/ tingling starting at 4am when pt woke up, pt shower and went to work , numbness subsided now having mild feelings on right fore arm, no decreased sensation, pain. PMH: HTN, CHF. FIT called for eval d/t +BEFAST, no stoke called call as per md becerra

## 2022-10-01 NOTE — ED PROVIDER NOTE - PATIENT PORTAL LINK FT
You can access the FollowMyHealth Patient Portal offered by [facility] by registering at the following website: [email or web address]. By joining [employer]’s FollowMyHealth portal, you will also be able to view your health information using other applications (apps) compatible with our system.

## 2022-10-01 NOTE — ED PROVIDER NOTE - NSFOLLOWUPINSTRUCTIONS_ED_ALL_ED_FT
You are signing out against medical advice (AMA) as you stated you do not want to stay for further evaluation/treatment, including, but not limited to:  MRA head/neck study for further evaluation of abnormal CT exam finding; concern for possible vertebral artery dissection.  As discussed, this test should be urgently performed.  If a dissection is present, and it is not promptly / properly treated, it could lead to disastrous consequences, including, but not limited to, stroke, disability, death.    If you change your mind at any point, you are encouraged to return to the Emergency Department ASAP for continued evaluation.  If you instead opt to follow with your primary care doctor, this should be urgently prioritized so you can get your MRA on an outpatient basis.    Continue your home medications as previously advised by your doctor(s).     If you need to find a doctor to follow up with, you may call the [facility] Patient Access Services helpline at [phone] to find names/contact #s for a practitioner (or specialist) to follow up with.    Bring your discharge papers / test results with you to your follow up appointment(s).    Rest / no strenuous activity.  Stay well hydrated.    ***Return to the Emergency Department IMMEDIATELY if you experience any new / worsening symptoms or have any problems / concerns.***

## 2022-10-02 VITALS
HEART RATE: 58 BPM | RESPIRATION RATE: 15 BRPM | SYSTOLIC BLOOD PRESSURE: 185 MMHG | DIASTOLIC BLOOD PRESSURE: 111 MMHG | OXYGEN SATURATION: 100 % | TEMPERATURE: 98 F

## 2022-10-02 PROBLEM — E78.5 HYPERLIPIDEMIA, UNSPECIFIED: Chronic | Status: ACTIVE | Noted: 2022-04-06

## 2022-10-02 NOTE — ED ADULT NURSE REASSESSMENT NOTE - NS ED NURSE REASSESS COMMENT FT1
Patient A&Ox4, respirations even and unlabored. States he would like to go home. Continues to be hypertensive. JSOE EDUARDO Cadena made aware of BP and dc request by patient.

## 2022-11-17 NOTE — ED PROVIDER NOTE - CPE EDP GASTRO NORM
Chest Incubation Time: 2 Hours
Face And Ears Incubation Time: 1 Hour
Location: Face
Detail Level: Simple
Photosensitizer: Levulan
Consent: The procedure and risks were reviewed with the patient including but not limited to: burning, pigmentary changes, pain, blistering, scabbing, redness, and the possibility of needing numerous treatments. Strict photoprotection after the procedure was also discussed.
Occlusion: No
Face And Scalp Incubation Time: 1 Hour for the face and 2 Hours for the scalp
Frequency Of Pdt: Two treatments
Pdt Type: GURU-U
normal...

## 2022-11-22 NOTE — ED PROVIDER NOTE - CPE EDP MUSC NORM
Pharmacy: Discharge Counseling and Medication Reconciliation    Dennis J Goodell  [address] [place] [address]  [phone] (home) [phone] (work)  45 year old male  PCP:James Archuleta    No Known Allergies    Discharge Counseling:    Pharmacist met with patient (and/or family) today to review the medication portion of the After Visit Summary (with an emphasis on NEW medications) and to address patient's questions/concerns.     Summary of Education:   Met with patient at time of discharge to review all changes in medications including new ciprofloxacin, metronidazole and oxycodone. Discussed indications, directions for use, and possible side effects. Patient asked a few questions and all concerns were addressed.     Materials Provided:   MedCounselor sheets printed from Clinical Pharmacology on: ciprofloxacin, metronidazole and oxycodone    Discharge Medication Reconciliation:    Germania Orellana RPH has reviewed the patient's discharge medication orders and has compared them to the inpatient medication administration record and to what the patient was taking prior to admission- any discrepancies have been resolved.     It has been determined that the patient has an adequate supply of medications available or which can be obtained from the patient's preferred pharmacy, which has been confirmed as: [facility].     Thank you for the consult.     Germania Orellana RPH ....................  11/22/2022   11:06 AM    
normal...

## 2022-12-06 NOTE — ASU PATIENT PROFILE, ADULT - MENTAL HEALTH CONDITIONS/SYMPTOMS, PROFILE
Palliative Care Progress Note           Patient Name: Yoselyn Cisneros  YOB: 1962  MRN: [id number]  Facility: [facility]  Location or Unit: [facility]    Referring Physician: Edinson Kamara MD  [address] [place] [address]                  Date of Visit: 12/6/2022   Visit Made By: Jeana Herron [facility]    Primary Care Physician: Jeana Lindsey MD  Office Phone #: [phone]  Fax Phone #: [phone]      Reason for Palliative Care: Education, Patient/Family and Psychosocial Support, Patient/Family     Number of hospitalizations in the last year: 2  Last [facility] Discharge Date2/7/2022         Advance Care Planning      N/A       Assessment      Met with patient, her brother Zachery, who's from [place], [place], with his fihamilton Naranjo.  Patient has discovered \" more options today and before moving forward, wanted to discuss them with her , Ten.  He gets off work at 7pm and then will be up to see patient.  Patient didn't elaborate on options.  Discussed options of hospice at home, inpatient and that's what patient and her spouse need to decide on. Patient had a great time visiting with family and friends over the weekend.  Her son, Colton is coming today from [place] to see her.  She would like time with him today.  If patient is here on Wednesday December 7, PCSW will follow up.  Palliative Care Coordinator/ provided active listening, clarification,validation, supportive counseling, emotional support, processed and reviewed thoughts, feelings, worries and fears as much as patient would elaborate. Provided encouragement and reassurance.      Plan      PCSW will continue to monitor and support the patient and her family through this hospitalization. Will be available if needed sooner.         Thank you for involving Palliative and Supportive Care. Please contact the covering provider via Perfect Serve([place])/Page([place]) with further questions or  concerns.  Jeana Herron [facility]    Note to Patient: The 21st Century Cures Act makes medical notes like these available to patients in the interest of transparency. However, be advised this is a medical document. It is intended as peer to peer communication. It is written in medical language and may contain abbreviations or verbiage that are unfamiliar. It may appear blunt or direct. Medical documents are intended to carry relevant information, facts as evident, and the clinical opinion of the practitioner.  This note may have been transcribed using a voice dictation system. Voice recognition errors may occur. This should not be taken to alter the content or meaning of this note.       Metrics            LVAD: No  #1 Post-Visit: Yes  #2 Post-Visit: Yes  #3 Post-Visit:  upon discharge        none

## 2022-12-28 ENCOUNTER — EMERGENCY (EMERGENCY)
Facility: HOSPITAL | Age: 42
LOS: 1 days | Discharge: HOME | End: 2022-12-28
Attending: STUDENT IN AN ORGANIZED HEALTH CARE EDUCATION/TRAINING PROGRAM | Admitting: STUDENT IN AN ORGANIZED HEALTH CARE EDUCATION/TRAINING PROGRAM
Payer: COMMERCIAL

## 2022-12-28 VITALS
DIASTOLIC BLOOD PRESSURE: 77 MMHG | HEART RATE: 55 BPM | SYSTOLIC BLOOD PRESSURE: 160 MMHG | OXYGEN SATURATION: 97 % | TEMPERATURE: 98 F | RESPIRATION RATE: 18 BRPM

## 2022-12-28 PROCEDURE — 99285 EMERGENCY DEPT VISIT HI MDM: CPT

## 2022-12-28 PROCEDURE — 73600 X-RAY EXAM OF ANKLE: CPT | Mod: 26,RT

## 2022-12-28 PROCEDURE — 73630 X-RAY EXAM OF FOOT: CPT | Mod: 26,RT

## 2022-12-28 NOTE — ED PROVIDER NOTE - WR ORDER NAME 1
Xray Foot AP + Lateral + Oblique, Right Clofazimine Counseling:  I discussed with the patient the risks of clofazimine including but not limited to skin and eye pigmentation, liver damage, nausea/vomiting, gastrointestinal bleeding and allergy.

## 2022-12-28 NOTE — ED ADULT TRIAGE NOTE - NS ED NURSE BANDS TYPE
Name band; Opioid Pregnancy And Lactation Text: These medications can lead to premature delivery and should be avoided during pregnancy. These medications are also present in breast milk in small amounts.

## 2022-12-28 NOTE — ED PROVIDER NOTE - CLINICAL SUMMARY MEDICAL DECISION MAKING FREE TEXT BOX
42M h/o HTN, HLD, HFrEF p/w R ankle and foot pain worsening x4 days s/p mechanical fall with diffuse swelling, ttp over R metatarsal - will obtain XRay to r/o fracture. Declined pain meds at this time

## 2022-12-28 NOTE — ED PROVIDER NOTE - PATIENT PORTAL LINK FT
You can access the FollowMyHealth Patient Portal offered by [facility] by registering at the following website: [email or web address]. By joining [employer]’s FollowMyHealth portal, you will also be able to view your health information using other applications (apps) compatible with our system.

## 2022-12-28 NOTE — ED PROVIDER NOTE - NSFOLLOWUPINSTRUCTIONS_ED_ALL_ED_FT
- Lab and imaging results, if performed, were discussed with you along with your discharge diagnosis    - Return to the ED for any new, worsening, or concerning symptoms to you    - Continue all prescribed medications    - Take ibuprofen/tylenol as directed as needed for pain  To control your pain at home, you should take Ibuprofen 400 mg along with Tylenol 650mg-1000mg every 6 to 8 hours. Limit your maximum daily Tylenol from all sources to 4000mg. Be aware that many other medications contain acetaminophen which is also known as Tylenol. Taking Tylenol and Ibuprofen together has been shown to be more effective at relieving pain than taking them separately. These are both over the counter medications that you can  at your local pharmacy without a prescription. You need to respect all of the warnings on the bottles. You shouldn’t take these medications for more than a week without following up with your doctor. Both medications come with certain risks and side effects that you need to discuss with your doctor, especially if you are taking them for a prolonged period.    - Keep your leg elevated when at rest. Apply ice in 20 minute intervals. Use the crutches as taught

## 2022-12-28 NOTE — ED ADULT TRIAGE NOTE - CHIEF COMPLAINT QUOTE
states" I trip and fell on 25th and hurt my right foot and ankle" swelling and pain to right foot and ankle noted. takes ASA daily

## 2022-12-28 NOTE — ED PROVIDER NOTE - OBJECTIVE STATEMENT
42M h/o HTN, HFrEF, HLD p/w R foot/ankle pain. Reports trip and fall down steps 4 days ago with worsening pain and swelling, becoming more painful to bear weight. Denies other areas of injury. Mechanical fall without head trauma or LOC. No other complaints

## 2022-12-28 NOTE — ED PROVIDER NOTE - PROGRESS NOTE DETAILS
Received call from radiology questioning location of fracture on XRay. Recommending CT to better delineate. Discussed with patient and ordered. -Magdalene Bullard D.O: patient cleared for discharge from ortho, CT read without e/o fracture, pt to follow-up with Ortho outpatient (Dr. Moe Glez).

## 2022-12-28 NOTE — ED PROVIDER NOTE - ADDITIONAL NOTES AND INSTRUCTIONS:
Alexsander can return to work 1/1/23. He has been instructed to rest at home and should follow-up with Orthopedics

## 2022-12-28 NOTE — ED PROVIDER NOTE - CARE PROVIDER_API CALL
Moe Glez  Orthopedic Surgery  [address]  [place] [address]  Phone: [phone]  Fax: [phone]  Follow Up Time: 4-6 Days

## 2022-12-29 VITALS
TEMPERATURE: 98 F | SYSTOLIC BLOOD PRESSURE: 175 MMHG | OXYGEN SATURATION: 100 % | HEART RATE: 58 BPM | RESPIRATION RATE: 19 BRPM | DIASTOLIC BLOOD PRESSURE: 99 MMHG

## 2022-12-29 PROCEDURE — 73700 CT LOWER EXTREMITY W/O DYE: CPT | Mod: 26,RT,MG

## 2022-12-29 PROCEDURE — 73590 X-RAY EXAM OF LOWER LEG: CPT | Mod: 26,RT

## 2022-12-29 PROCEDURE — G1004: CPT

## 2022-12-30 ENCOUNTER — APPOINTMENT (OUTPATIENT)
Dept: ORTHOPEDIC SURGERY | Facility: CLINIC | Age: 42
End: 2022-12-30
Payer: COMMERCIAL

## 2022-12-30 ENCOUNTER — NON-APPOINTMENT (OUTPATIENT)
Age: 42
End: 2022-12-30

## 2022-12-30 DIAGNOSIS — M25.474 EFFUSION, RIGHT FOOT: ICD-10-CM

## 2022-12-30 PROCEDURE — 99204 OFFICE O/P NEW MOD 45 MIN: CPT

## 2022-12-30 NOTE — PHYSICAL EXAM
[de-identified] : Constitutional: Well-nourished, well-developed, No acute distress\par Respiratory:  Good respiratory effort, no SOB\par Psychiatric: Pleasant and normal affect, alert and oriented x3\par Skin: Clean dry and intact Right LE\par Musculoskeletal: normal except where as noted in regional exam\par \par Right Ankle/foot:\par APPEARANCE: + Moderate swelling of the anterolateral ankle, significant swelling of the forefoot and overlying the first MTP joint, no marked deformities  or malalignment\par POSITIVE TENDERNESS: Moderate tenderness at the ATFL, CFL, Lateral ankle.  Significant point tenderness around the first MTP joint\par NONTENDER: medial malleolus, lateral malleolus, tibialis posterior tendon, achilles tendon, no marked thickening of tendon, PTFL, anterior tibiofibular ligament (high ankle), sinus tarsus, deltoid ligaments, 5th metatarsal. \par RANGE OF MOTION: Mild limitation of PF and Inversion due to pain/swelling, NL DF and Eversion. \par RESISTIVE TESTING: Mild pain with resisted eversion and DF.  painless resisted  plantar flexion, and inversion. \par SPECIAL TESTS: + anterior drawer for pain without laxity, + talar tilt for pain without laxity, neg Kleiger's\par  [de-identified] : I reviewed, interpreted and clinically correlated the following outside imaging studies,\par In system x-rays, 3 views of the right ankle, no acute fracture seen.\par \par __________\par  ACC: [id number] EXAM: CT ANKLE ONLY RT\par \par PROCEDURE DATE: 12/29/2022\par \par \par \par INTERPRETATION: CLINICAL INFORMATION: Status post trip and fall with right ankle pain and swelling.\par \par TECHNIQUE: CT of the right ankle was performed in bone and soft tissue windows with coronal and sagittal reformats. No intravenous contrast was administered.\par \par COMPARISON: No similar prior studies are available for comparison.\par \par FINDINGS:\par \par Bone: No fracture or dislocation is demonstrated. An osteophyte is noted in the anterior distal tibia and fibula. Degenerative change is seen at the talonavicular articulation. Achilles enthesopathy and a plantar calcaneal spur are noted.\par \par Soft tissues: Moderate circumferential soft tissue swelling and subcutaneous inflammatory change is seen in the right ankle extending into the lower leg and foot. Vascular calcifications are noted.\par \par IMPRESSION:\par \par No fracture or dislocation of the right ankle.

## 2022-12-30 NOTE — RETURN TO WORK/SCHOOL
[FreeTextEntry1] : Alexsander was seen today for evaluation management of right foot and ankle pain.  Please excuse him from work until 1/9/2023.\par Should you have any questions please call the office at [phone]\par Thank you for your understanding.\par \par Sincerely,\par \par Fredrick Nolen DO, ATC\par Primary Care Sports Medicine\par [facility] [place]\par

## 2022-12-30 NOTE — DISCUSSION/SUMMARY
[de-identified] : Discussed findings of today's exam and possible causes of patient's pain.  Educated patient on their most probable diagnosis of acute right ankle sprain with significant resultant swelling of the forefoot and first MTP joint.  Reviewed possible courses of treatment, and we collaboratively decided best course of treatment at this time will include conservative management.  Patient has no evidence of ankle ligament instability, he appears to have a grade 1 sprain.  Patient has been wearing an Ace wrap and Aircast and has been nonweightbearing since he was seen in the ER a few days ago.  Patient has significant swelling of the forefoot and surrounding the first MTP joint, he actually has the most significant point tenderness at the first MTP joint with pain due to even light palpation.  This is likely due to the significant swelling through the forefoot and may be due to some localized nerve hypersensitivity due to nerve compression and swelling.  Patient is having difficulty with ambulation because of his ankle and foot pain.  At this time patient will be placed in a short cam walker boot for ambulatory assistive device, he may be partial weightbearing and transition to full weightbearing as tolerated utilizing his crutches.  Patient will be started on a course of physical therapy to restore normal range of motion and strength as tolerated.  Patient educated on the importance of elevating his foot and ankle above the level of his heart for this upcoming holiday weekend, he is advised to perform toe scrunches and ankle pumps as tolerated throughout the weekend.  Patient is intermittently taking Advil 400 mg.  He is advised that he would benefit from a short course of regular oral NSAIDs, recommend Advil 800 mg p.o. twice daily with food consistently for 5-7 days, then as needed thereafter.  Patient states an understanding of this prescription drug management plan.  Patient may take Tylenol as needed for breakthrough pain.  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  He will be excuse from work for the next 1 week, he may need 2 weeks off of work potentially.  If patient feels like he cannot return to work he should follow-up within 2 weeks.  Patient appreciates and agrees with current plan.\par \par I work as part of an academic orthopedic group and routinely have a physician in training (resident / fellow) working with me.  Any part of the history and physical exam performed by the physician in training was either directly reviewed and/or replicated by myself.  Any procedure performed by the physician in training was performed under my direct supervision and with the consent of the patient.\par \par This note was generated using dragon medical dictation software.  A reasonable effort has been made for proofreading its contents, but typos may still remain.  If there are any questions or points of clarification needed please notify my office.

## 2022-12-30 NOTE — HISTORY OF PRESENT ILLNESS
[de-identified] : Patient is here for right ankle pain that began on 12/25/22 when he slipped and fell on stairs. He went to the ER where he had images taken and was told there was no fracture. he was given an air cast and crutches. He is taking Ibuprofen and Tylenol for pain. He is having sensitivity at his toe. Denies N/T/R/Prior injury. He is an  at [employer] and is on his feet frequently. \par \par The patient's past medical history, past surgical history, medications and allergies were reviewed by me today and documented accordingly. In addition, the patient's family and social history, which were noncontributory to this visit, were reviewed also. Intake form was reviewed. The patient has no family history of arthritis.

## 2023-01-06 ENCOUNTER — NON-APPOINTMENT (OUTPATIENT)
Age: 43
End: 2023-01-06

## 2023-01-13 ENCOUNTER — APPOINTMENT (OUTPATIENT)
Dept: ORTHOPEDIC SURGERY | Facility: CLINIC | Age: 43
End: 2023-01-13
Payer: COMMERCIAL

## 2023-01-13 DIAGNOSIS — S93.401A SPRAIN OF UNSPECIFIED LIGAMENT OF RIGHT ANKLE, INITIAL ENCOUNTER: ICD-10-CM

## 2023-01-13 PROCEDURE — 99213 OFFICE O/P EST LOW 20 MIN: CPT

## 2023-01-13 NOTE — RETURN TO WORK/SCHOOL
[FreeTextEntry1] : Alexsander was seen today for reevaluation of his right ankle sprain.  He is permitted to return to work on 1/18/2023.  He will still require use of cam walker boot for the next 2-3 weeks.  Please allow any reasonable work accommodations during that time.\par Should you have any questions please call the office at [phone]\par Thank you for your understanding.\par \par Sincerely,\par \par Fredrick Nolen DO, ATC\par Primary Care Sports Medicine\par [facility] [place]\par

## 2023-01-13 NOTE — DISCUSSION/SUMMARY
[de-identified] : Patient was seen today for reevaluation of right ankle sprain.  His pain is gradually decreasing, he has improved range of motion and function since last evaluation.  Patient feels like he will be ready to return to his work duties at [employer] in the middle of next week on Wednesday 1/18, he is advised that he is cleared to do so.  Patient is recommended to utilize his cam walker boot as he returns to work.  He will continue his course of physical therapy and gradually discontinue utilization of cam walker boot over the next 2-3 weeks.  Recommend follow-up in 1 month for reassessment.  Patient appreciates and agrees with current plan.\par \par \par This note was generated using dragon medical dictation software.  A reasonable effort has been made for proofreading its contents, but typos may still remain.  If there are any questions or points of clarification needed please notify my office.

## 2023-01-13 NOTE — HISTORY OF PRESENT ILLNESS
[de-identified] : Patient is here for right ankle pain follow up. Patient has noticed improvement but does not feel that he is ready to return to work without restrictions. 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 He has been doing physical therapy for his ankle, which he feels has been very helpful to improve his ankle strength and range of motion. He still wears the cam boot when weightbearing and ambulating. He is applying ice to the ankle twice per day and takes Motrin and Tylenol for pain PRN. Denies any new symptoms or complaints at this time.

## 2023-01-13 NOTE — PHYSICAL EXAM
[de-identified] : Constitutional: Well-nourished, well-developed, No acute distress\par Respiratory:  Good respiratory effort, no SOB\par Psychiatric: Pleasant and normal affect, alert and oriented x3\par Skin: Clean dry and intact Right LE\par Musculoskeletal: normal except where as noted in regional exam\par \par Right Ankle/foot:\par APPEARANCE: + mild swelling of the anterolateral ankle, mild swelling of the dorsum of the midfoot, no marked deformities  or malalignment\par POSITIVE TENDERNESS: Moderate tenderness at the ATFL, CFL, Lateral ankle. \par NONTENDER: 1st MTP joint, medial malleolus, lateral malleolus, tibialis posterior tendon, achilles tendon, no marked thickening of tendon, PTFL, anterior tibiofibular ligament (high ankle), sinus tarsus, deltoid ligaments, 5th metatarsal. \par RANGE OF MOTION: Mild limitation of PF and Inversion due to pain/swelling, NL DF and Eversion. \par RESISTIVE TESTING: Mild pain with resisted eversion and DF.  painless resisted  plantar flexion, and inversion. \par SPECIAL TESTS: + anterior drawer for pain without laxity, + talar tilt for pain without laxity, neg Kleiger's\par

## 2023-03-16 NOTE — ED ADULT TRIAGE NOTE - HEART RATE (BEATS/MIN)
85 Prednisone Pregnancy And Lactation Text: This medication is Pregnancy Category C and it isn't know if it is safe during pregnancy. This medication is excreted in breast milk.

## 2023-11-11 NOTE — ASU PATIENT PROFILE, ADULT - PRO ARRIVE FROM
Patient with history of hemorrhoids and hydradenitis suppurativa is concerning for bleed from hemorrhoids and hydradenitis or GI source CBC will be obtained and outpatient follow up with GI for futher investigation
home

## 2023-11-23 ENCOUNTER — INPATIENT (INPATIENT)
Facility: HOSPITAL | Age: 43
LOS: 1 days | Discharge: HOME | End: 2023-11-25
Attending: INTERNAL MEDICINE | Admitting: INTERNAL MEDICINE
Payer: COMMERCIAL

## 2023-11-23 VITALS
SYSTOLIC BLOOD PRESSURE: 215 MMHG | TEMPERATURE: 99 F | WEIGHT: 315 LBS | DIASTOLIC BLOOD PRESSURE: 129 MMHG | HEIGHT: 76 IN | HEART RATE: 62 BPM | OXYGEN SATURATION: 97 % | RESPIRATION RATE: 17 BRPM

## 2023-11-23 LAB
ALBUMIN SERPL ELPH-MCNC: 3.1 G/DL — LOW (ref 3.3–5)
ALBUMIN SERPL ELPH-MCNC: 3.1 G/DL — LOW (ref 3.3–5)
ALP SERPL-CCNC: 59 U/L — SIGNIFICANT CHANGE UP (ref 40–120)
ALP SERPL-CCNC: 59 U/L — SIGNIFICANT CHANGE UP (ref 40–120)
ALT FLD-CCNC: 21 U/L — SIGNIFICANT CHANGE UP (ref 12–78)
ALT FLD-CCNC: 21 U/L — SIGNIFICANT CHANGE UP (ref 12–78)
AMPHET UR-MCNC: NEGATIVE — SIGNIFICANT CHANGE UP
AMPHET UR-MCNC: NEGATIVE — SIGNIFICANT CHANGE UP
ANION GAP SERPL CALC-SCNC: 4 MMOL/L — LOW (ref 5–17)
ANION GAP SERPL CALC-SCNC: 4 MMOL/L — LOW (ref 5–17)
APPEARANCE UR: CLEAR — SIGNIFICANT CHANGE UP
APPEARANCE UR: CLEAR — SIGNIFICANT CHANGE UP
APTT BLD: 29.1 SEC — SIGNIFICANT CHANGE UP (ref 24.5–35.6)
APTT BLD: 29.1 SEC — SIGNIFICANT CHANGE UP (ref 24.5–35.6)
AST SERPL-CCNC: 15 U/L — SIGNIFICANT CHANGE UP (ref 15–37)
AST SERPL-CCNC: 15 U/L — SIGNIFICANT CHANGE UP (ref 15–37)
BARBITURATES UR SCN-MCNC: NEGATIVE — SIGNIFICANT CHANGE UP
BARBITURATES UR SCN-MCNC: NEGATIVE — SIGNIFICANT CHANGE UP
BASE EXCESS BLDV CALC-SCNC: 1 MMOL/L — SIGNIFICANT CHANGE UP (ref -2–3)
BASE EXCESS BLDV CALC-SCNC: 1 MMOL/L — SIGNIFICANT CHANGE UP (ref -2–3)
BASOPHILS # BLD AUTO: 0.04 K/UL — SIGNIFICANT CHANGE UP (ref 0–0.2)
BASOPHILS # BLD AUTO: 0.04 K/UL — SIGNIFICANT CHANGE UP (ref 0–0.2)
BASOPHILS NFR BLD AUTO: 0.5 % — SIGNIFICANT CHANGE UP (ref 0–2)
BASOPHILS NFR BLD AUTO: 0.5 % — SIGNIFICANT CHANGE UP (ref 0–2)
BENZODIAZ UR-MCNC: NEGATIVE — SIGNIFICANT CHANGE UP
BENZODIAZ UR-MCNC: NEGATIVE — SIGNIFICANT CHANGE UP
BILIRUB SERPL-MCNC: 0.6 MG/DL — SIGNIFICANT CHANGE UP (ref 0.2–1.2)
BILIRUB SERPL-MCNC: 0.6 MG/DL — SIGNIFICANT CHANGE UP (ref 0.2–1.2)
BILIRUB UR-MCNC: NEGATIVE — SIGNIFICANT CHANGE UP
BILIRUB UR-MCNC: NEGATIVE — SIGNIFICANT CHANGE UP
BLOOD GAS COMMENTS, VENOUS: SIGNIFICANT CHANGE UP
BLOOD GAS COMMENTS, VENOUS: SIGNIFICANT CHANGE UP
BUN SERPL-MCNC: 13 MG/DL — SIGNIFICANT CHANGE UP (ref 7–23)
BUN SERPL-MCNC: 13 MG/DL — SIGNIFICANT CHANGE UP (ref 7–23)
CALCIUM SERPL-MCNC: 8.5 MG/DL — SIGNIFICANT CHANGE UP (ref 8.5–10.1)
CALCIUM SERPL-MCNC: 8.5 MG/DL — SIGNIFICANT CHANGE UP (ref 8.5–10.1)
CHLORIDE SERPL-SCNC: 108 MMOL/L — SIGNIFICANT CHANGE UP (ref 96–108)
CHLORIDE SERPL-SCNC: 108 MMOL/L — SIGNIFICANT CHANGE UP (ref 96–108)
CO2 BLDV-SCNC: 24 MMOL/L — SIGNIFICANT CHANGE UP (ref 22–26)
CO2 BLDV-SCNC: 24 MMOL/L — SIGNIFICANT CHANGE UP (ref 22–26)
CO2 SERPL-SCNC: 26 MMOL/L — SIGNIFICANT CHANGE UP (ref 22–31)
CO2 SERPL-SCNC: 26 MMOL/L — SIGNIFICANT CHANGE UP (ref 22–31)
COCAINE METAB.OTHER UR-MCNC: NEGATIVE — SIGNIFICANT CHANGE UP
COCAINE METAB.OTHER UR-MCNC: NEGATIVE — SIGNIFICANT CHANGE UP
COLOR SPEC: YELLOW — SIGNIFICANT CHANGE UP
COLOR SPEC: YELLOW — SIGNIFICANT CHANGE UP
CREAT SERPL-MCNC: 1.04 MG/DL — SIGNIFICANT CHANGE UP (ref 0.5–1.3)
CREAT SERPL-MCNC: 1.04 MG/DL — SIGNIFICANT CHANGE UP (ref 0.5–1.3)
DIFF PNL FLD: NEGATIVE — SIGNIFICANT CHANGE UP
DIFF PNL FLD: NEGATIVE — SIGNIFICANT CHANGE UP
EGFR: 91 ML/MIN/1.73M2 — SIGNIFICANT CHANGE UP
EGFR: 91 ML/MIN/1.73M2 — SIGNIFICANT CHANGE UP
EOSINOPHIL # BLD AUTO: 0.1 K/UL — SIGNIFICANT CHANGE UP (ref 0–0.5)
EOSINOPHIL # BLD AUTO: 0.1 K/UL — SIGNIFICANT CHANGE UP (ref 0–0.5)
EOSINOPHIL NFR BLD AUTO: 1.4 % — SIGNIFICANT CHANGE UP (ref 0–6)
EOSINOPHIL NFR BLD AUTO: 1.4 % — SIGNIFICANT CHANGE UP (ref 0–6)
ETHANOL SERPL-MCNC: <10 MG/DL — SIGNIFICANT CHANGE UP (ref 0–10)
ETHANOL SERPL-MCNC: <10 MG/DL — SIGNIFICANT CHANGE UP (ref 0–10)
GAS PNL BLDV: SIGNIFICANT CHANGE UP
GAS PNL BLDV: SIGNIFICANT CHANGE UP
GLUCOSE SERPL-MCNC: 107 MG/DL — HIGH (ref 70–99)
GLUCOSE SERPL-MCNC: 107 MG/DL — HIGH (ref 70–99)
GLUCOSE UR QL: NEGATIVE MG/DL — SIGNIFICANT CHANGE UP
GLUCOSE UR QL: NEGATIVE MG/DL — SIGNIFICANT CHANGE UP
HCO3 BLDV-SCNC: 23 MMOL/L — SIGNIFICANT CHANGE UP (ref 22–28)
HCO3 BLDV-SCNC: 23 MMOL/L — SIGNIFICANT CHANGE UP (ref 22–28)
HCT VFR BLD CALC: 43.9 % — SIGNIFICANT CHANGE UP (ref 39–50)
HCT VFR BLD CALC: 43.9 % — SIGNIFICANT CHANGE UP (ref 39–50)
HGB BLD-MCNC: 13.8 G/DL — SIGNIFICANT CHANGE UP (ref 13–17)
HGB BLD-MCNC: 13.8 G/DL — SIGNIFICANT CHANGE UP (ref 13–17)
HOROWITZ INDEX BLDV+IHG-RTO: 21 — SIGNIFICANT CHANGE UP
HOROWITZ INDEX BLDV+IHG-RTO: 21 — SIGNIFICANT CHANGE UP
IMM GRANULOCYTES NFR BLD AUTO: 0.1 % — SIGNIFICANT CHANGE UP (ref 0–0.9)
IMM GRANULOCYTES NFR BLD AUTO: 0.1 % — SIGNIFICANT CHANGE UP (ref 0–0.9)
INR BLD: 0.93 RATIO — SIGNIFICANT CHANGE UP (ref 0.85–1.18)
INR BLD: 0.93 RATIO — SIGNIFICANT CHANGE UP (ref 0.85–1.18)
KETONES UR-MCNC: NEGATIVE MG/DL — SIGNIFICANT CHANGE UP
KETONES UR-MCNC: NEGATIVE MG/DL — SIGNIFICANT CHANGE UP
LACTATE SERPL-SCNC: 0.7 MMOL/L — SIGNIFICANT CHANGE UP (ref 0.7–2)
LACTATE SERPL-SCNC: 0.7 MMOL/L — SIGNIFICANT CHANGE UP (ref 0.7–2)
LEUKOCYTE ESTERASE UR-ACNC: NEGATIVE — SIGNIFICANT CHANGE UP
LEUKOCYTE ESTERASE UR-ACNC: NEGATIVE — SIGNIFICANT CHANGE UP
LYMPHOCYTES # BLD AUTO: 1.43 K/UL — SIGNIFICANT CHANGE UP (ref 1–3.3)
LYMPHOCYTES # BLD AUTO: 1.43 K/UL — SIGNIFICANT CHANGE UP (ref 1–3.3)
LYMPHOCYTES # BLD AUTO: 19.6 % — SIGNIFICANT CHANGE UP (ref 13–44)
LYMPHOCYTES # BLD AUTO: 19.6 % — SIGNIFICANT CHANGE UP (ref 13–44)
MCHC RBC-ENTMCNC: 24.8 PG — LOW (ref 27–34)
MCHC RBC-ENTMCNC: 24.8 PG — LOW (ref 27–34)
MCHC RBC-ENTMCNC: 31.4 G/DL — LOW (ref 32–36)
MCHC RBC-ENTMCNC: 31.4 G/DL — LOW (ref 32–36)
MCV RBC AUTO: 78.8 FL — LOW (ref 80–100)
MCV RBC AUTO: 78.8 FL — LOW (ref 80–100)
METHADONE UR-MCNC: NEGATIVE — SIGNIFICANT CHANGE UP
METHADONE UR-MCNC: NEGATIVE — SIGNIFICANT CHANGE UP
MONOCYTES # BLD AUTO: 0.51 K/UL — SIGNIFICANT CHANGE UP (ref 0–0.9)
MONOCYTES # BLD AUTO: 0.51 K/UL — SIGNIFICANT CHANGE UP (ref 0–0.9)
MONOCYTES NFR BLD AUTO: 7 % — SIGNIFICANT CHANGE UP (ref 2–14)
MONOCYTES NFR BLD AUTO: 7 % — SIGNIFICANT CHANGE UP (ref 2–14)
NEUTROPHILS # BLD AUTO: 5.19 K/UL — SIGNIFICANT CHANGE UP (ref 1.8–7.4)
NEUTROPHILS # BLD AUTO: 5.19 K/UL — SIGNIFICANT CHANGE UP (ref 1.8–7.4)
NEUTROPHILS NFR BLD AUTO: 71.4 % — SIGNIFICANT CHANGE UP (ref 43–77)
NEUTROPHILS NFR BLD AUTO: 71.4 % — SIGNIFICANT CHANGE UP (ref 43–77)
NITRITE UR-MCNC: NEGATIVE — SIGNIFICANT CHANGE UP
NITRITE UR-MCNC: NEGATIVE — SIGNIFICANT CHANGE UP
NRBC # BLD: 0 /100 WBCS — SIGNIFICANT CHANGE UP (ref 0–0)
NRBC # BLD: 0 /100 WBCS — SIGNIFICANT CHANGE UP (ref 0–0)
OPIATES UR-MCNC: NEGATIVE — SIGNIFICANT CHANGE UP
OPIATES UR-MCNC: NEGATIVE — SIGNIFICANT CHANGE UP
PCO2 BLDV: 29 MMHG — LOW (ref 42–55)
PCO2 BLDV: 29 MMHG — LOW (ref 42–55)
PCP SPEC-MCNC: SIGNIFICANT CHANGE UP
PCP SPEC-MCNC: SIGNIFICANT CHANGE UP
PCP UR-MCNC: NEGATIVE — SIGNIFICANT CHANGE UP
PCP UR-MCNC: NEGATIVE — SIGNIFICANT CHANGE UP
PH BLDV: 7.51 — HIGH (ref 7.32–7.43)
PH BLDV: 7.51 — HIGH (ref 7.32–7.43)
PH UR: 7 — SIGNIFICANT CHANGE UP (ref 5–8)
PH UR: 7 — SIGNIFICANT CHANGE UP (ref 5–8)
PLATELET # BLD AUTO: 155 K/UL — SIGNIFICANT CHANGE UP (ref 150–400)
PLATELET # BLD AUTO: 155 K/UL — SIGNIFICANT CHANGE UP (ref 150–400)
PO2 BLDV: 205 MMHG — HIGH (ref 25–45)
PO2 BLDV: 205 MMHG — HIGH (ref 25–45)
POTASSIUM SERPL-MCNC: 4.1 MMOL/L — SIGNIFICANT CHANGE UP (ref 3.5–5.3)
POTASSIUM SERPL-MCNC: 4.1 MMOL/L — SIGNIFICANT CHANGE UP (ref 3.5–5.3)
POTASSIUM SERPL-SCNC: 4.1 MMOL/L — SIGNIFICANT CHANGE UP (ref 3.5–5.3)
POTASSIUM SERPL-SCNC: 4.1 MMOL/L — SIGNIFICANT CHANGE UP (ref 3.5–5.3)
PROT SERPL-MCNC: 7.2 GM/DL — SIGNIFICANT CHANGE UP (ref 6–8.3)
PROT SERPL-MCNC: 7.2 GM/DL — SIGNIFICANT CHANGE UP (ref 6–8.3)
PROT UR-MCNC: NEGATIVE MG/DL — SIGNIFICANT CHANGE UP
PROT UR-MCNC: NEGATIVE MG/DL — SIGNIFICANT CHANGE UP
PROTHROM AB SERPL-ACNC: 11.2 SEC — SIGNIFICANT CHANGE UP (ref 9.5–13)
PROTHROM AB SERPL-ACNC: 11.2 SEC — SIGNIFICANT CHANGE UP (ref 9.5–13)
RBC # BLD: 5.57 M/UL — SIGNIFICANT CHANGE UP (ref 4.2–5.8)
RBC # BLD: 5.57 M/UL — SIGNIFICANT CHANGE UP (ref 4.2–5.8)
RBC # FLD: 15.2 % — HIGH (ref 10.3–14.5)
RBC # FLD: 15.2 % — HIGH (ref 10.3–14.5)
SAO2 % BLDV: 100 % — HIGH (ref 94–98)
SAO2 % BLDV: 100 % — HIGH (ref 94–98)
SODIUM SERPL-SCNC: 138 MMOL/L — SIGNIFICANT CHANGE UP (ref 135–145)
SODIUM SERPL-SCNC: 138 MMOL/L — SIGNIFICANT CHANGE UP (ref 135–145)
SP GR SPEC: 1.02 — SIGNIFICANT CHANGE UP (ref 1–1.03)
SP GR SPEC: 1.02 — SIGNIFICANT CHANGE UP (ref 1–1.03)
THC UR QL: POSITIVE — SIGNIFICANT CHANGE UP
THC UR QL: POSITIVE — SIGNIFICANT CHANGE UP
TROPONIN I, HIGH SENSITIVITY RESULT: 43.6 NG/L — SIGNIFICANT CHANGE UP
TROPONIN I, HIGH SENSITIVITY RESULT: 43.6 NG/L — SIGNIFICANT CHANGE UP
UROBILINOGEN FLD QL: 1 MG/DL — SIGNIFICANT CHANGE UP (ref 0.2–1)
UROBILINOGEN FLD QL: 1 MG/DL — SIGNIFICANT CHANGE UP (ref 0.2–1)
WBC # BLD: 7.28 K/UL — SIGNIFICANT CHANGE UP (ref 3.8–10.5)
WBC # BLD: 7.28 K/UL — SIGNIFICANT CHANGE UP (ref 3.8–10.5)
WBC # FLD AUTO: 7.28 K/UL — SIGNIFICANT CHANGE UP (ref 3.8–10.5)
WBC # FLD AUTO: 7.28 K/UL — SIGNIFICANT CHANGE UP (ref 3.8–10.5)

## 2023-11-23 PROCEDURE — 70496 CT ANGIOGRAPHY HEAD: CPT | Mod: 26,77

## 2023-11-23 PROCEDURE — 70450 CT HEAD/BRAIN W/O DYE: CPT | Mod: 26,77,59

## 2023-11-23 PROCEDURE — 99223 1ST HOSP IP/OBS HIGH 75: CPT

## 2023-11-23 PROCEDURE — 93010 ELECTROCARDIOGRAM REPORT: CPT

## 2023-11-23 PROCEDURE — 0042T: CPT | Mod: MA

## 2023-11-23 PROCEDURE — 0042T: CPT

## 2023-11-23 PROCEDURE — 70498 CT ANGIOGRAPHY NECK: CPT | Mod: 26,MA

## 2023-11-23 PROCEDURE — 70498 CT ANGIOGRAPHY NECK: CPT | Mod: 26,77

## 2023-11-23 PROCEDURE — 70496 CT ANGIOGRAPHY HEAD: CPT | Mod: 26,MA

## 2023-11-23 PROCEDURE — 71045 X-RAY EXAM CHEST 1 VIEW: CPT | Mod: 26

## 2023-11-23 PROCEDURE — 70450 CT HEAD/BRAIN W/O DYE: CPT | Mod: 26,MA,59

## 2023-11-23 PROCEDURE — 99285 EMERGENCY DEPT VISIT HI MDM: CPT

## 2023-11-23 RX ORDER — HYDRALAZINE HCL 50 MG
10 TABLET ORAL ONCE
Refills: 0 | Status: DISCONTINUED | OUTPATIENT
Start: 2023-11-23 | End: 2023-11-23

## 2023-11-23 RX ORDER — FUROSEMIDE 40 MG
40 TABLET ORAL
Refills: 0 | Status: DISCONTINUED | OUTPATIENT
Start: 2023-11-23 | End: 2023-11-25

## 2023-11-23 RX ORDER — CLOPIDOGREL BISULFATE 75 MG/1
75 TABLET, FILM COATED ORAL DAILY
Refills: 0 | Status: DISCONTINUED | OUTPATIENT
Start: 2023-11-23 | End: 2023-11-25

## 2023-11-23 RX ORDER — ASPIRIN/CALCIUM CARB/MAGNESIUM 324 MG
81 TABLET ORAL DAILY
Refills: 0 | Status: DISCONTINUED | OUTPATIENT
Start: 2023-11-23 | End: 2023-11-25

## 2023-11-23 RX ORDER — CARVEDILOL PHOSPHATE 80 MG/1
25 CAPSULE, EXTENDED RELEASE ORAL EVERY 12 HOURS
Refills: 0 | Status: DISCONTINUED | OUTPATIENT
Start: 2023-11-23 | End: 2023-11-25

## 2023-11-23 RX ORDER — ATORVASTATIN CALCIUM 80 MG/1
80 TABLET, FILM COATED ORAL AT BEDTIME
Refills: 0 | Status: DISCONTINUED | OUTPATIENT
Start: 2023-11-23 | End: 2023-11-25

## 2023-11-23 RX ORDER — SACUBITRIL AND VALSARTAN 24; 26 MG/1; MG/1
1 TABLET, FILM COATED ORAL
Refills: 0 | Status: DISCONTINUED | OUTPATIENT
Start: 2023-11-23 | End: 2023-11-25

## 2023-11-23 RX ORDER — FUROSEMIDE 40 MG
80 TABLET ORAL DAILY
Refills: 0 | Status: DISCONTINUED | OUTPATIENT
Start: 2023-11-23 | End: 2023-11-23

## 2023-11-23 RX ADMIN — Medication 81 MILLIGRAM(S): at 21:20

## 2023-11-23 RX ADMIN — ATORVASTATIN CALCIUM 80 MILLIGRAM(S): 80 TABLET, FILM COATED ORAL at 21:20

## 2023-11-23 RX ADMIN — CLOPIDOGREL BISULFATE 75 MILLIGRAM(S): 75 TABLET, FILM COATED ORAL at 21:20

## 2023-11-23 NOTE — H&P ADULT - NSHPPHYSICALEXAM_GEN_ALL_CORE
PHYSICAL EXAMINATION:  Vital Signs Last 24 Hrs  T(C): 36.9 (23 Nov 2023 19:25), Max: 37 (23 Nov 2023 17:12)  T(F): 98.5 (23 Nov 2023 19:25), Max: 98.6 (23 Nov 2023 17:12)  HR: 60 (23 Nov 2023 19:25) (60 - 73)  BP: 229/141 (23 Nov 2023 19:25) (187/104 - 229/141)  BP(mean): --  RR: 12 (23 Nov 2023 19:25) (12 - 17)  SpO2: 98% (23 Nov 2023 19:25) (97% - 100%)    Parameters below as of 23 Nov 2023 19:25  Patient On (Oxygen Delivery Method): room air      CAPILLARY BLOOD GLUCOSE      POCT Blood Glucose.: 115 mg/dL (23 Nov 2023 17:08)      GENERAL: NAD,   ENMT: mucous membranes moist  NECK: supple, No JVD  CHEST/LUNG: clear to auscultation bilaterally; no rales, rhonchi, or wheezing b/l  HEART: normal S1, S2  ABDOMEN: BS+, soft, ND, NT   EXTREMITIES:  pulses palpable; no clubbing, cyanosis, or edema b/l LEs  NEURO: awake, alert, interactive; moves all extremities PHYSICAL EXAMINATION:  Vital Signs Last 24 Hrs  T(C): 36.9 (23 Nov 2023 19:25), Max: 37 (23 Nov 2023 17:12)  T(F): 98.5 (23 Nov 2023 19:25), Max: 98.6 (23 Nov 2023 17:12)  HR: 60 (23 Nov 2023 19:25) (60 - 73)  BP: 229/141 (23 Nov 2023 19:25) (187/104 - 229/141)  BP(mean): --  RR: 12 (23 Nov 2023 19:25) (12 - 17)  SpO2: 98% (23 Nov 2023 19:25) (97% - 100%)    Parameters below as of 23 Nov 2023 19:25  Patient On (Oxygen Delivery Method): room air      CAPILLARY BLOOD GLUCOSE      POCT Blood Glucose.: 115 mg/dL (23 Nov 2023 17:08)      GENERAL: NAD, seen in ER, follows all commands, CN intact, mild right sided weakness 4/5 arm  and leg, mild expressive aphasia    ENMT: mucous membranes moist  NECK: supple, No JVD  CHEST/LUNG: clear to auscultation bilaterally; no rales, rhonchi, or wheezing b/l  HEART: normal S1, S2  ABDOMEN: BS+, soft, ND, NT   EXTREMITIES:  pulses palpable; no clubbing, cyanosis, or edema b/l LEs  NEURO: awake, alert, interactive; moves all extremities

## 2023-11-23 NOTE — H&P ADULT - NSHPLABSRESULTS_GEN_ALL_CORE
LABS:                        13.8   7.28  )-----------( 155      ( 2023 18:30 )             43.9     11    138  |  108  |  13  ----------------------------<  107<H>  4.1   |  26  |  1.04    Ca    8.5      2023 18:00    TPro  7.2  /  Alb  3.1<L>  /  TBili  0.6  /  DBili  x   /  AST  15  /  ALT  21  /  AlkPhos  59  11-23    PT/INR - ( 2023 18:00 )   PT: 11.2 sec;   INR: 0.93 ratio         PTT - ( 2023 18:00 )  PTT:29.1 sec  Urinalysis Basic - ( 2023 18:30 )    Color: Yellow / Appearance: Clear / S.024 / pH: x  Gluc: x / Ketone: Negative mg/dL  / Bili: Negative / Urobili: 1.0 mg/dL   Blood: x / Protein: Negative mg/dL / Nitrite: Negative   Leuk Esterase: Negative / RBC: x / WBC x   Sq Epi: x / Non Sq Epi: x / Bacteria: x          RADIOLOGY & ADDITIONAL TESTS:

## 2023-11-23 NOTE — ED ADULT NURSE NOTE - OBJECTIVE STATEMENT
Assisting primary RN at this time 43M PMHx HTN presents to the ED with right arm numbness x three days and delayed response, slurred speech since 9am that resolved. Code stroke called in triage, patient sent to CT scan for emergent imaging as ordered, placed on cardiac monitor as ordered. Patient hypertensive, admits compliance with medications

## 2023-11-23 NOTE — H&P ADULT - HISTORY OF PRESENT ILLNESS
43 year old male PMH HTN, HLD, CAD on ASA/Plavix, CHF BIB wife concerned about stroke-like symptoms. Per wife, pt with  intermittent R sided numbness x 3 days, but sensation would return. Pt with onset R sided numbness 0900 this AM, but pt thought sensation would return & so did not come to ED even though wife prompted. Wife noted pt with confusion, delayed answering of questions around 2 to 3 PM as well as mild slurring of words. Pt with elevated BP in ER >200/>100, FS 150s.  43 year old male PMH HTN, HLD, CAD on ASA/Plavix, CHF, normal cath 2 years ago,  BIB wife concerned about stroke-like symptoms. Per wife, pt with  intermittent R sided numbness x 3 days, but sensation would return. Pt with onset R sided numbness 0900 this AM, but pt thought sensation would return & so did not come to ED even though wife prompted. Wife noted pt with confusion, delayed answering of questions around 2 to 3 PM as well as mild slurring of words. Pt with elevated BP in ER >200/>100, FS 150s.

## 2023-11-23 NOTE — ED ADULT TRIAGE NOTE - CHIEF COMPLAINT QUOTE
Patient BIBA for right side weakness and numbness x 3 days but this morning at 9 am patient was noted with slurred speech with delayed responses.   hx HTN

## 2023-11-23 NOTE — ED ADULT NURSE REASSESSMENT NOTE - NS ED NURSE REASSESS COMMENT FT1
Pt AAOx4. Pt states that he feels as though he is having more difficulty speaking at this time. No facial droop or facial asymmetry noted at this time. Dr Hernandez made aware via teams.

## 2023-11-23 NOTE — ED ADULT NURSE NOTE - NS PRO PASSIVE SMOKE EXP
Patient was not available for the therapy session at this time.  Reason not seen: Other (comment)(does not allow writer to bring legs of reclienr down) (02/28/19 7154).     Re-Attempt Plan: Will re-attempt later today;Will re-attempt tomorrow (02/28/19 8489).    Patient was not available for the therapy session at this time.  Reason not seen: Other health personnel with patient;Other (comment)(declines walking, LE exercise, other PT intervention despite education and encouragement. \"too much all at once\") (02/28/19 8549).     Re-Attempt Plan: Will re-attempt tomorrow (02/28/19 0963).     No

## 2023-11-23 NOTE — H&P ADULT - ASSESSMENT
43 year old male PMH HTN, HLD, CAD on ASA/Plavix, CHF BIB wife concerned about stroke-like symptoms. Per wife, pt with  intermittent R sided numbness x 3 days, but sensation would return. Pt with onset R sided numbness 0900 this AM, but pt thought sensation would return & so did not come to ED even though wife prompted. Wife noted pt with confusion, delayed answering of questions around 2 to 3 PM as well as mild slurring of words. Pt with elevated BP in ER >200/>100, FS 150s.       Plan:  43 year old male PMH HTN, HLD, CAD on ASA/Plavix, CHF BIB wife concerned about stroke-like symptoms. Per wife, pt with  intermittent R sided numbness x 3 days, but sensation would return. Pt with onset R sided numbness 0900 this AM, but pt thought sensation would return & so did not come to ED even though wife prompted. Wife noted pt with confusion, delayed answering of questions around 2 to 3 PM as well as mild slurring of words. Pt with elevated BP in ER >200/>100, FS 150s.       Plan: Admit to tele for CVA with expressive aphasia and mild right sided weakness. CT head confirms left frontal CVA,   ICA stenosis noted as well > 90 %. Will add Plavix to ASA daily, change to Lipitor 80 mg/day, lipid panel in AM, continue home Coreg and Entresto regimen with daily Lasix.     Neuro consult was called by ER.     TTE to r/o cardiac source and brain MRI non-contrast.    43 year old male PMH HTN, HLD, CAD on ASA/Plavix, CHF BIB wife concerned about stroke-like symptoms. Per wife, pt with  intermittent R sided numbness x 3 days, but sensation would return. Pt with onset R sided numbness 0900 this AM, but pt thought sensation would return & so did not come to ED even though wife prompted. Wife noted pt with confusion, delayed answering of questions around 2 to 3 PM as well as mild slurring of words. Pt with elevated BP in ER >200/>100, FS 150s.       Plan: Admit to tele for CVA with expressive aphasia and mild right sided weakness. CT head confirms left MCA distribution   infarct, no bleed. CTA notes high grade left ICA stenosis > 90 %, likey the cause of MCA infarct. Neurochecks every 8 hours.   PT eval.     Will add Plavix to ASA daily, change to Lipitor 80 mg/day, lipid panel in AM.     Continue home meds of Coreg and Entresto regimen with daily Lasix. Per discussion with pt and wife,   he does not know cause of cardiomyopathy, no stents have been placed.  Nutrition consult for AM.      Neuro consult was called by ER, Dr. Elisa Ponce.      TTE to r/o cardiac source and brain MRI non-contrast.    43 year old male PMH HTN, HLD, CAD on ASA/Plavix, CHF BIB wife concerned about stroke-like symptoms. Per wife, pt with  intermittent R sided numbness x 3 days, but sensation would return. Pt with onset R sided numbness 0900 this AM, but pt thought sensation would return & so did not come to ED even though wife prompted. Wife noted pt with confusion, delayed answering of questions around 2 to 3 PM as well as mild slurring of words. Pt with elevated BP in ER >200/>100, FS 150s.       Plan: Admit to tele for CVA with expressive aphasia and mild right sided weakness. CT head confirms left MCA distribution   infarct, no bleed. CTA notes high grade left ICA stenosis > 90 %, likey the cause of MCA infarct. Neurochecks every 8 hours.   PT eval. Agree with ER, NIHSS of 2 from mild aphasia and mild right sided weakness.      Will add Plavix to ASA daily, change to Lipitor 80 mg/day, lipid panel in AM.     Continue home meds of Coreg and Entresto regimen with daily Lasix. Per discussion with pt and wife,   he does not know cause of cardiomyopathy, no stents have been placed.  Nutrition consult for AM.      Neuro consult was called by ER, Dr. Elisa Ponce.      TTE to r/o cardiac source and brain MRI non-contrast.

## 2023-11-23 NOTE — ED PROVIDER NOTE - CLINICAL SUMMARY MEDICAL DECISION MAKING FREE TEXT BOX
Code Stroke, outside window TNK, ? w/in window thrombolytics, concern for ICH given HTN, AMS. 43M PMH HTN, HLD, CAD, CHF pw CVA symptoms, last know well 0900 this AM. Afebrile, VSS other than + markedly elevated BP. . Code Stroke, outside window TNK, ? w/in window thrombolytics, concern for ICH given HTN, AMS.   D/w Radiology: No ICH, + acute infarct L centrum semiovale, L posterior superior frontal lobe. D/w Tele Stroke: No TNK d/t outside window, no thrombectomy d/t no LVO. Pt already on ASA / Plavix / Statin. + permissive HTN (up to 220/110).   Will admit to Tele (d/w Dr Hernandez), Neuro (Dr Cornelia Ponce) consulted. Pt, wife updated to results, admission. They understand / agree w/ this plan. 43M PMH HTN, HLD, CAD, CHF pw CVA symptoms, last know well 0900 this AM. Afebrile, VSS other than + markedly elevated BP. . NIH 2 d/t decreased R sided sensation, mild aphasia. Code Stroke, outside window TNK, ? w/in window thrombolytics, concern for ICH given HTN, AMS.   D/w Radiology: No ICH, + acute infarct L centrum semiovale, L posterior superior frontal lobe. D/w Tele Stroke: No TNK d/t outside window, no thrombectomy d/t no LVO. Pt already on ASA / Plavix / Statin. + permissive HTN (up to 220/110).   Will admit to Tele (d/w Dr Hernandez), Neuro (Dr Cornelia Ponce) consulted. Pt, wife updated to results, admission. They understand / agree w/ this plan.

## 2023-11-23 NOTE — ED PROVIDER NOTE - OBJECTIVE STATEMENT
43M PMH HTN, HLD, CAD on ASA/Plavix, CHF BIB wife d/t stroke-like symptoms. Per wife, pt w/ intermittent R sided numbness x3 days, but sensation would return. Pt w/ onset R sided numbness 0900 this AM, but pt thought sensation would return & so did not come to ED even though wife prompted. Wife noted pt w/ confusion, delayed answering of questions around 2/3P as well as mild slurring of words. Pt w/ elevated BP >200/>100, FS 150s.     PMH as above, PSH none, NKDA, meds as listed (not on AC).

## 2023-11-23 NOTE — ED PROVIDER NOTE - PROGRESS NOTE DETAILS
S/p admission, pt w/ worsening expressive aphasia, will re-order CT brain stroke imaging. D/w Radiology: No ICH / no change from prior.

## 2023-11-23 NOTE — ED ADULT NURSE REASSESSMENT NOTE - NS ED NURSE REASSESS COMMENT FT1
Pt AAOx4. Returned from CT scan, speaking without difficulty at this time. Pt able to swallow pills/water with no issues. IV on right side removed, pt states that it was hurting him. Respirations equal and unlabored. No acute distress noted at this time. Remains on cardiac monitor. Wife at bedside.

## 2023-11-23 NOTE — ED PROVIDER NOTE - PHYSICAL EXAMINATION
GEN: Awake, alert, interactive, NAD.  HEAD AND NECK: NC/AT. Airway patent. Neck supple.   EYES:  Clear b/l. EOMI. PERRL.   ENT: Moist mucus membranes.   CARDIAC: Regular rate, regular rhythm. No evident pedal edema.    RESP/CHEST: Normal respiratory effort with no use of accessory muscles or retractions. Clear throughout on auscultation.  ABD: Soft, non-distended, non-tender. No rebound, no guarding.   BACK: No midline spinal TTP. No CVAT.   EXTREMITIES: Moving all extremities with no apparent deformities.   SKIN: Warm, dry, intact normal color. No rash.   NEURO: AOx3, CN II-XII grossly intact, no focal deficits.   PSYCH: Appropriate mood and affect. GEN: Awake, alert, interactive, NAD.  HEAD AND NECK: NC/AT. Airway patent. Neck supple.   EYES:  Clear b/l. EOMI. PERRL.   ENT: Moist mucus membranes.   CARDIAC: Regular rate, regular rhythm. No evident pedal edema.    RESP/CHEST: Normal respiratory effort with no use of accessory muscles or retractions. Clear throughout on auscultation.  ABD: Soft, non-distended, non-tender. No rebound, no guarding.   BACK: No midline spinal TTP. No CVAT.   EXTREMITIES: Moving all extremities with no apparent deformities.   SKIN: Warm, dry, intact normal color. No rash.   NEURO: AOx3,    PSYCH: Appropriate mood and affect. GEN: Awake, alert, interactive, NAD.  HEAD AND NECK: NC/AT. Airway patent. Neck supple.   EYES:  Clear b/l. EOMI. PERRL.   ENT: Moist mucus membranes.   CARDIAC: Regular rate, regular rhythm. No evident pedal edema.    RESP/CHEST: Normal respiratory effort with no use of accessory muscles or retractions. Clear throughout on auscultation.  ABD: Soft, non-distended, non-tender. No rebound, no guarding.   BACK: No midline spinal TTP. No CVAT.   EXTREMITIES: Moving all extremities with no apparent deformities.   SKIN: Warm, dry, intact normal color. No rash.   NEURO: AOx3, NIH 2 d/t decreased R sided sensation, mild aphasia.   PSYCH: Appropriate mood and affect.

## 2023-11-24 LAB
ANION GAP SERPL CALC-SCNC: 7 MMOL/L — SIGNIFICANT CHANGE UP (ref 5–17)
ANION GAP SERPL CALC-SCNC: 7 MMOL/L — SIGNIFICANT CHANGE UP (ref 5–17)
BUN SERPL-MCNC: 13 MG/DL — SIGNIFICANT CHANGE UP (ref 7–23)
BUN SERPL-MCNC: 13 MG/DL — SIGNIFICANT CHANGE UP (ref 7–23)
CALCIUM SERPL-MCNC: 8.7 MG/DL — SIGNIFICANT CHANGE UP (ref 8.5–10.1)
CALCIUM SERPL-MCNC: 8.7 MG/DL — SIGNIFICANT CHANGE UP (ref 8.5–10.1)
CHLORIDE SERPL-SCNC: 110 MMOL/L — HIGH (ref 96–108)
CHLORIDE SERPL-SCNC: 110 MMOL/L — HIGH (ref 96–108)
CHOLEST SERPL-MCNC: 168 MG/DL — SIGNIFICANT CHANGE UP
CHOLEST SERPL-MCNC: 168 MG/DL — SIGNIFICANT CHANGE UP
CO2 SERPL-SCNC: 25 MMOL/L — SIGNIFICANT CHANGE UP (ref 22–31)
CO2 SERPL-SCNC: 25 MMOL/L — SIGNIFICANT CHANGE UP (ref 22–31)
CREAT SERPL-MCNC: 1.13 MG/DL — SIGNIFICANT CHANGE UP (ref 0.5–1.3)
CREAT SERPL-MCNC: 1.13 MG/DL — SIGNIFICANT CHANGE UP (ref 0.5–1.3)
EGFR: 83 ML/MIN/1.73M2 — SIGNIFICANT CHANGE UP
EGFR: 83 ML/MIN/1.73M2 — SIGNIFICANT CHANGE UP
GLUCOSE SERPL-MCNC: 121 MG/DL — HIGH (ref 70–99)
GLUCOSE SERPL-MCNC: 121 MG/DL — HIGH (ref 70–99)
HDLC SERPL-MCNC: 35 MG/DL — LOW
HDLC SERPL-MCNC: 35 MG/DL — LOW
LIPID PNL WITH DIRECT LDL SERPL: 113 MG/DL — HIGH
LIPID PNL WITH DIRECT LDL SERPL: 113 MG/DL — HIGH
NON HDL CHOLESTEROL: 133 MG/DL — HIGH
NON HDL CHOLESTEROL: 133 MG/DL — HIGH
POTASSIUM SERPL-MCNC: 3.7 MMOL/L — SIGNIFICANT CHANGE UP (ref 3.5–5.3)
POTASSIUM SERPL-MCNC: 3.7 MMOL/L — SIGNIFICANT CHANGE UP (ref 3.5–5.3)
POTASSIUM SERPL-SCNC: 3.7 MMOL/L — SIGNIFICANT CHANGE UP (ref 3.5–5.3)
POTASSIUM SERPL-SCNC: 3.7 MMOL/L — SIGNIFICANT CHANGE UP (ref 3.5–5.3)
SODIUM SERPL-SCNC: 142 MMOL/L — SIGNIFICANT CHANGE UP (ref 135–145)
SODIUM SERPL-SCNC: 142 MMOL/L — SIGNIFICANT CHANGE UP (ref 135–145)
TRIGL SERPL-MCNC: 109 MG/DL — SIGNIFICANT CHANGE UP
TRIGL SERPL-MCNC: 109 MG/DL — SIGNIFICANT CHANGE UP
TSH SERPL-MCNC: 1.23 UIU/ML — SIGNIFICANT CHANGE UP (ref 0.36–3.74)
TSH SERPL-MCNC: 1.23 UIU/ML — SIGNIFICANT CHANGE UP (ref 0.36–3.74)

## 2023-11-24 PROCEDURE — ZZZZZ: CPT

## 2023-11-24 PROCEDURE — 99232 SBSQ HOSP IP/OBS MODERATE 35: CPT

## 2023-11-24 PROCEDURE — 93306 TTE W/DOPPLER COMPLETE: CPT | Mod: 26

## 2023-11-24 RX ORDER — HEPARIN SODIUM 5000 [USP'U]/ML
7500 INJECTION INTRAVENOUS; SUBCUTANEOUS EVERY 8 HOURS
Refills: 0 | Status: DISCONTINUED | OUTPATIENT
Start: 2023-11-24 | End: 2023-11-24

## 2023-11-24 RX ORDER — ISOSORBIDE MONONITRATE 60 MG/1
120 TABLET, EXTENDED RELEASE ORAL DAILY
Refills: 0 | Status: DISCONTINUED | OUTPATIENT
Start: 2023-11-24 | End: 2023-11-24

## 2023-11-24 RX ORDER — ISOSORBIDE MONONITRATE 60 MG/1
60 TABLET, EXTENDED RELEASE ORAL DAILY
Refills: 0 | Status: DISCONTINUED | OUTPATIENT
Start: 2023-11-24 | End: 2023-11-25

## 2023-11-24 RX ORDER — ENOXAPARIN SODIUM 100 MG/ML
40 INJECTION SUBCUTANEOUS EVERY 24 HOURS
Refills: 0 | Status: DISCONTINUED | OUTPATIENT
Start: 2023-11-24 | End: 2023-11-25

## 2023-11-24 RX ADMIN — Medication 81 MILLIGRAM(S): at 12:04

## 2023-11-24 RX ADMIN — CLOPIDOGREL BISULFATE 75 MILLIGRAM(S): 75 TABLET, FILM COATED ORAL at 12:04

## 2023-11-24 RX ADMIN — ISOSORBIDE MONONITRATE 60 MILLIGRAM(S): 60 TABLET, EXTENDED RELEASE ORAL at 17:00

## 2023-11-24 RX ADMIN — Medication 40 MILLIGRAM(S): at 05:56

## 2023-11-24 RX ADMIN — Medication 40 MILLIGRAM(S): at 13:52

## 2023-11-24 RX ADMIN — SACUBITRIL AND VALSARTAN 1 TABLET(S): 24; 26 TABLET, FILM COATED ORAL at 05:55

## 2023-11-24 RX ADMIN — CARVEDILOL PHOSPHATE 25 MILLIGRAM(S): 80 CAPSULE, EXTENDED RELEASE ORAL at 17:00

## 2023-11-24 RX ADMIN — SACUBITRIL AND VALSARTAN 1 TABLET(S): 24; 26 TABLET, FILM COATED ORAL at 17:00

## 2023-11-24 RX ADMIN — ENOXAPARIN SODIUM 40 MILLIGRAM(S): 100 INJECTION SUBCUTANEOUS at 13:51

## 2023-11-24 RX ADMIN — ATORVASTATIN CALCIUM 80 MILLIGRAM(S): 80 TABLET, FILM COATED ORAL at 21:29

## 2023-11-24 NOTE — OCCUPATIONAL THERAPY INITIAL EVALUATION ADULT - PERTINENT HX OF CURRENT PROBLEM, REHAB EVAL
43 year old male PMH HTN, HLD, CAD on ASA/Plavix, CHF, normal cath 2 years ago,  BIB wife concerned about stroke-like symptoms. Per wife, pt with  intermittent R sided numbness x 3 days, but sensation would return. Pt with onset R sided numbness 0900 this AM, but pt thought sensation would return & so did not come to ED even though wife prompted.

## 2023-11-24 NOTE — SWALLOW BEDSIDE ASSESSMENT ADULT - SWALLOW EVAL: DIAGNOSIS
Pt presented with overtly adequate oropharyngeal skills for regular solid/thin liquid with no signs of suspected aspiration.

## 2023-11-24 NOTE — OCCUPATIONAL THERAPY INITIAL EVALUATION ADULT - BALANCE TRAINING, PT EVAL
Pt will increase dynamic standing balance to good minus in 2 weeks to increase participation in ADLs.

## 2023-11-24 NOTE — DIETITIAN INITIAL EVALUATION ADULT - OTHER INFO
Pt was not self monitoring weight daily for hx of CHF.  Nutrition education for heart healthy nutrition therapy, tips to support wt. loss, healthy wt. loss, heart failure nutrition therapy, fluid limitations reviewed c pt, adherence encouraged.

## 2023-11-24 NOTE — SWALLOW BEDSIDE ASSESSMENT ADULT - H & P REVIEW
"43 year old male PMH HTN, HLD, CAD on ASA/Plavix, CHF, normal cath 2 years ago,  BIB wife concerned about stroke-like symptoms. Per wife, pt with  intermittent R sided numbness x 3 days,"/yes

## 2023-11-24 NOTE — OCCUPATIONAL THERAPY INITIAL EVALUATION ADULT - GENERAL OBSERVATIONS, REHAB EVAL
Pt encountered seat upright in bed, NAD, all lines intact, pt reported no pain, pt agreeable to OT eval

## 2023-11-24 NOTE — DIETITIAN INITIAL EVALUATION ADULT - ORAL INTAKE PTA/DIET HISTORY
Per pt, he did not follow any diet regimen PTA, however upon asking questions, pt followed low sodium diet.  Pt c intentional wt. loss in the past month through portion control.  Pt's wife did the shopping/cooking.  Pt without report of food allergies/intolerances.  11/24, swallow evaluation c recommendation for regular solids, thin liquids.

## 2023-11-24 NOTE — PROGRESS NOTE ADULT - SUBJECTIVE AND OBJECTIVE BOX
Patient is a 43y old  Male who presents with a chief complaint of Aphasia and right sided weakness. (24 Nov 2023 16:12)      INTERVAL HPI/OVERNIGHT EVENTS:  Pt was seen and examined, no acute events.      MEDICATIONS  (STANDING):  aspirin enteric coated 81 milliGRAM(s) Oral daily  atorvastatin 80 milliGRAM(s) Oral at bedtime  carvedilol 25 milliGRAM(s) Oral every 12 hours  clopidogrel Tablet 75 milliGRAM(s) Oral daily  enoxaparin Injectable 40 milliGRAM(s) SubCutaneous every 24 hours  furosemide    Tablet 40 milliGRAM(s) Oral two times a day  isosorbide   mononitrate ER Tablet (IMDUR) 60 milliGRAM(s) Oral daily  sacubitril 97 mG/valsartan 103 mG 1 Tablet(s) Oral two times a day    MEDICATIONS  (PRN):      Allergies    No Known Allergies    Intolerances          Vital Signs Last 24 Hrs  T(C): 37.1 (24 Nov 2023 16:38), Max: 37.1 (23 Nov 2023 23:47)  T(F): 98.8 (24 Nov 2023 16:38), Max: 98.8 (23 Nov 2023 23:47)  HR: 58 (24 Nov 2023 18:35) (51 - 78)  BP: 177/99 (24 Nov 2023 18:35) (125/86 - 212/116)  BP(mean): 135 (24 Nov 2023 16:38) (135 - 135)  RR: 18 (24 Nov 2023 18:35) (18 - 20)  SpO2: 97% (24 Nov 2023 18:35) (95% - 100%)        PHYSICAL EXAM:  GENERAL: NAD  HEAD:  Atraumatic  EYES: PERRLA  ENMT: Mouth moist   NECK: Supple  NERVOUS SYSTEM:  Awake, alert, rt sided wkness  CHEST/LUNG: Clear  HEART: RRR, S1, S2  ABDOMEN: Soft, non tender  EXTREMITIES:  no edema BL LE  SKIN: No rash        LABS:                        13.8   7.28  )-----------( 155      ( 23 Nov 2023 18:30 )             43.9     11-24    142  |  110<H>  |  13  ----------------------------<  121<H>  3.7   |  25  |  1.13    Ca    8.7      24 Nov 2023 05:59    TPro  7.2  /  Alb  3.1<L>  /  TBili  0.6  /  DBili  x   /  AST  15  /  ALT  21  /  AlkPhos  59  11-23    PT/INR - ( 23 Nov 2023 18:00 )   PT: 11.2 sec;   INR: 0.93 ratio         PTT - ( 23 Nov 2023 18:00 )  PTT:29.1 sec  Urinalysis Basic - ( 24 Nov 2023 05:59 )    Color: x / Appearance: x / SG: x / pH: x  Gluc: 121 mg/dL / Ketone: x  / Bili: x / Urobili: x   Blood: x / Protein: x / Nitrite: x   Leuk Esterase: x / RBC: x / WBC x   Sq Epi: x / Non Sq Epi: x / Bacteria: x      CAPILLARY BLOOD GLUCOSE          RADIOLOGY & ADDITIONAL TESTS:    Imaging Personally Reviewed:  [ ] YES  [ ] NO    Consultant(s) Notes Reviewed:  [ ] YES  [ ] NO    Care Discussed with Consultants/Other Providers [ ] YES  [ ] NO

## 2023-11-24 NOTE — OCCUPATIONAL THERAPY INITIAL EVALUATION ADULT - LLE MMT, REHAB EVAL
INTERNAL MEDICINE OFFICE VISIT  [facility] [address] [facility] [address], [facility], [address] [facility]  Tel: [phone]      NAME: Jerson Gates  AGE: 72 y o  SEX: female  : 1953   MRN: [id number]    DATE: 2018  TIME: 1:25 PM      Assessment and Plan:  1  Benign essential hypertension  Blood pressure stable, continue same medications  - CBC and differential; Future  - Comprehensive metabolic panel; Future    2  Mixed hyperlipidemia  Follow-up lipid profile  - Lipid panel; Future    3  Acquired hypothyroidism  Continue levothyroxine at the same dose, follow-up labs  - TSH, 3rd generation; Future    4  Mild persistent asthma without complication  Continue inhalers as advised    5  SEBASTIÁN on CPAP  Stable, follow-up with pulmonology    6  Osteopenia, unspecified location  Stable, all her fractures have healed which happened secondary to the fall  7  Hyperglycemia  She was told to cut down on the carbohydrate intake  I will recheck the labs in 4 months   - Hemoglobin A1c; Future    8  Nocturia  It may be secondary to the hyperglycemia, however, I will check a urine culture and get back to her with the results  - Urine culture; Future      - Counseling Documentation: patient was counseled regarding: diagnostic results, instructions for management, risk factor reductions, prognosis, patient and family education, impressions, risks and benefits of treatment options and importance of compliance with treatment  - Medication Side Effects: Adverse side effects of medications were reviewed with the patient/guardian today  Return for follow up visit in 4 months or earlier, if needed  Chief Complaint:  Chief Complaint   Patient presents with    Well Check     Cat bite on right hand         History of Present Illness:   Hypertension-patient is taking metoprolol regularly    Hyperlipidemia-she does not take a statin, is taking red yeast rice and the lipids are under control  Hypothyroidism-takes levothyroxine regularly, does not have any symptoms  Asthma-stable on inhalers  Sleep apnea-uses a CPAP regularly and follows up with pulmonology  Osteopenia-stable  Hyperglycemia-her recent labs showed a fasting glucose of 106  Nocturia-she has noticed that she goes to the bathroom 2-3 times every night recently  She denies any burning or pain on micturition  Active Problem List:  Patient Active Problem List   Diagnosis    SAH (subarachnoid hemorrhage) (HCC)    Subdural hematoma (HCC)    Closed fracture of temporal bone (HCC)    Closed fracture of multiple ribs of left side    Traumatic pneumothorax    Closed fracture of left zygomatic arch (HCC)    Closed nondisplaced fracture of left acromial process    SEBASTIÁN on CPAP    Fall    Physical deconditioning    Ambulatory dysfunction    Hypothyroidism    Mild persistent asthma    Benign essential hypertension    Hyperlipidemia    Hypersomnia    Osteoarthrosis    Osteopenia    Supraventricular tachycardia ([address] )    Hyperglycemia         Past Medical History:  Past Medical History:   Diagnosis Date    Asthma     Cat bite     Disease of thyroid gland     Sleep apnea          Past Surgical History:  History reviewed  No pertinent surgical history  Family History:  History reviewed  No pertinent family history        Social History:  Social History     Social History    Marital status: Single     Spouse name: N/A    Number of children: N/A    Years of education: N/A     Social History Main Topics    Smoking status: Never Smoker    Smokeless tobacco: Never Used    Alcohol use Yes      Comment: Occasionally    Drug use: No    Sexual activity: No     Other Topics Concern    None     Social History Narrative    None         Allergies:  No Known Allergies      Medications:    Current Outpatient Prescriptions:     acetaminophen (TYLENOL) 325 mg tablet, Take 2 tablets by mouth every 6 (six) hours as needed for mild pain, Disp: 30 tablet, Rfl: 0    amoxicillin-clavulanate (AUGMENTIN) 875-125 mg per tablet, Take 1 tablet by mouth every 12 (twelve) hours for 7 days, Disp: 14 tablet, Rfl: 0    docusate sodium (COLACE) 100 mg capsule, Take 1 capsule by mouth 2 (two) times a day, Disp: 10 capsule, Rfl: 0    fluticasone (FLOVENT HFA) 44 mcg/act inhaler, Inhale 2 puffs 2 (two) times a day, Disp: , Rfl:     levothyroxine 75 mcg tablet, Take 75 mcg by mouth daily, Disp: , Rfl:     metoprolol succinate (TOPROL-XL) 25 mg 24 hr tablet, Take 1 tablet by mouth daily, Disp: , Rfl:     modafinil (PROVIGIL) 200 MG tablet, Take 200 mg by mouth daily, Disp: , Rfl:     senna (SENOKOT) 8 6 mg, Take 1 tablet by mouth daily at bedtime, Disp: 30 each, Rfl: 0    enoxaparin (LOVENOX) 40 mg/0 4 mL, Inject 0 4 mL under the skin every 24 hours for 28 days, Disp: 11 2 mL, Rfl: 0      The following portions of the patient's history were reviewed and updated as appropriate: past medical history, past surgical history, family history, social history, allergies, current medications and active problem list       Review of Systems:  Constitutional: Denies fever, chills, weight gain, weight loss, fatigue  Eyes: Denies eye redness, eye discharge, double vision, change in visual acuity  ENT: Denies hearing loss, tinnitus, sneezing, nasal congestion, nasal discharge, sore throat   Respiratory: Denies cough, expectoration, hemoptysis, shortness of breath, wheezing  Cardiovascular: Denies chest pain, palpitations, lower extremity swelling, orthopnea, PND  Gastrointestinal: Denies abdominal pain, heartburn, nausea, vomiting, hematemesis, diarrhea, bloody stools  Genito-Urinary: Denies dysuria, frequency, difficulty in micturition, nocturia, incontinence  Musculoskeletal: Denies back pain, joint pain, muscle pain  Neurologic: Denies confusion, lightheadedness, syncope, headache, focal weakness, sensory changes, seizures  Endocrine: Denies polyuria, polydipsia, temperature intolerance  Allergy and Immunology: Denies hives, insect bite sensitivity  Hematological and Lymphatic: Denies bleeding problems, swollen glands   Psychological: Denies depression, suicidal ideation, anxiety, panic, mood swings  Dermatological: Denies pruritus, rash, skin lesion changes      Vitals:  Vitals:    02/20/18 1303   BP: 114/70   Pulse: 85   Resp: 14   SpO2: 96%       Body mass index is 19 38 kg/m²  Weight (last 2 days)     Date/Time   Weight    02/20/18 1303  49 6 (109 4)                Physical Examination:  General: Patient is not in acute distress  Awake, alert, responding to commands  No weight gain or loss  Head: Normocephalic  Atraumatic  Eyes: Conjunctiva and lids with no swelling, erythema or discharge  Both pupils normal sized, round and reactive to light  Sclera nonicteric  ENT: External examination of nose and ear normal  Otoscopic examination shows translucent tympanic membranes with patent canals without erythema  Oropharynx moist with no erythema, edema, exudate or lesions  Neck: Supple  JVP not raised  Trachea midline  No masses  No thyromegaly  Lungs: No signs of increased work of breathing or respiratory distress  Bilateral bronchovascular breath sounds with no crackles or rhonchi  Chest wall: No tenderness  Cardiovascular: Normal PMI  No thrills  Regular rate and rhythm  S1 and S2 normal  No murmur, rub or gallop  Gastrointestinal: Abdomen soft, nontender  No guarding or rigidity  Liver and spleen not palpable  Bowel sounds present  Neurologic: Cranial nerves II-XII intact   Cortical functions normal  Motor system - Reflexes 2+ and symmetrical  Sensations normal  Musculoskeletal: Gait normal  No joint tenderness  Integumentary: Skin normal with no rash or lesions  Lymphatic: No palpable lymph nodes in neck, axilla or groin  Extremities: No clubbing, cyanosis, edema or varicosities  Psychological: Judgement and insight normal  Mood and affect normal      Laboratory Results:  CBC with diff:   Lab Results   Component Value Date    WBC 5 38 02/15/2018    RBC 4 51 02/15/2018    HGB 14 3 02/15/2018    HCT 42 9 02/15/2018    MCV 95 02/15/2018    MCH 31 7 02/15/2018    RDW 13 3 02/15/2018     02/15/2018       CMP:  Lab Results   Component Value Date    CREATININE 0 63 02/15/2018    BUN 17 02/15/2018     02/15/2018    K 3 6 02/15/2018     (H) 02/15/2018    CO2 25 02/15/2018    GLUCOSE 82 09/04/2017    PROT 7 1 02/15/2018    ALKPHOS 84 02/15/2018    ALT 19 02/15/2018    AST 12 02/15/2018       Lab Results   Component Value Date    MG 2 2 08/27/2017    PHOS 3 2 08/27/2017       Lab Results   Component Value Date    TROPONINI <0 02 08/26/2017       Lipid Profile:   Lab Results   Component Value Date    CHOL 220 (H) 02/15/2018    CHOL 228 (H) 10/04/2017     Lab Results   Component Value Date    HDL 89 (H) 02/15/2018    HDL 96 (H) 10/04/2017     Lab Results   Component Value Date    LDLCALC 120 (H) 02/15/2018    LDLCALC 123 (H) 10/04/2017     Lab Results   Component Value Date    TRIG 55 02/15/2018    TRIG 45 10/04/2017         Health Maintenance: There are no preventive care reminders to display for this patient    Immunization History   Administered Date(s) Administered    Influenza Quadrivalent, 6-35 Months IM 10/20/2015, 12/22/2016, 10/09/2017    Influenza TIV (IM) 10/20/2014    Pneumococcal Polysaccharide PPV23 1953    Tdap 1953, 02/15/2018    Zoster 1953, 12/01/2015         Clay Horne MD  2/20/2018,1:25 PM 4/5

## 2023-11-24 NOTE — DIETITIAN INITIAL EVALUATION ADULT - PERTINENT LABORATORY DATA
11-24    142  |  110<H>  |  13  ----------------------------<  121<H>  3.7   |  25  |  1.13    Ca    8.7      24 Nov 2023 05:59    TPro  7.2  /  Alb  3.1<L>  /  TBili  0.6  /  DBili  x   /  AST  15  /  ALT  21  /  AlkPhos  59  11-23  POCT Blood Glucose.: 115 mg/dL (11-23-23 @ 17:08)  11-24 Chol 168 LDL -- HDL 35<L> Trig 109

## 2023-11-24 NOTE — SWALLOW BEDSIDE ASSESSMENT ADULT - COMMENTS
CT Brain perfusion impression (11/23/23): Scattered areas of acute ischemia suggested within the inferior right frontal lobes, suggesting right MCA/BUSHRA territory infarcts.

## 2023-11-24 NOTE — SWALLOW BEDSIDE ASSESSMENT ADULT - SLP GENERAL OBSERVATIONS
Pt approached seated upright in chair at bedside; alert and orientedx4. Pt demonstrated suspected apraxia, and mild receptive and expressive aphasia and mild dysarthria; he c/o difficulty with speech.

## 2023-11-24 NOTE — PROGRESS NOTE ADULT - NUTRITIONAL ASSESSMENT
This patient has been assessed with a concern for Malnutrition and has been determined to have a diagnosis/diagnoses of Morbid obesity (BMI > 40).    This patient is being managed with:   Diet DASH/TLC-  Sodium & Cholesterol Restricted  Entered: Nov 23 2023  8:45PM

## 2023-11-24 NOTE — OCCUPATIONAL THERAPY INITIAL EVALUATION ADULT - ADDITIONAL COMMENTS
As per patient he lives with [place] in [place] with 13 KAREEM 1 HR with one level set up. Pt reported he was Independent with ADLs/IADLs, independent with ambulation, pt was also working full time and driving. Pt reported he has no DME at home.

## 2023-11-24 NOTE — CONSULT NOTE ADULT - ASSESSMENT
Subjective Complaints:      Consult requested by ER doctor:                  Attending:     History of Present Illness:  Chief Complaint/Reason for Admission:  History of Present Illness:  HPI:  43 year old male PMH HTN, HLD, CAD on ASA/Plavix, CHF, normal cath 2 years ago,  BIB wife concerned about stroke-like symptoms. Per wife, pt with  intermittent R sided numbness x 3 days, but sensation would return. Pt with onset R sided numbness 0900 this AM, but pt thought sensation would return & so did not come to ED even though wife prompted. Wife noted pt with confusion, delayed answering of questions around 2 to 3 PM as well as mild slurring of words. Pt with elevated BP in ER >200/>100, FS 150s.  (23 Nov 2023 20:47)        PAST MEDICAL & SURGICAL HISTORY:  Congestive heart failure      HTN (hypertension)      HLD (hyperlipidemia)      No significant past surgical history      43yMale    MEDICATIONS  (STANDING):  aspirin enteric coated 81 milliGRAM(s) Oral daily  atorvastatin 80 milliGRAM(s) Oral at bedtime  carvedilol 25 milliGRAM(s) Oral every 12 hours  clopidogrel Tablet 75 milliGRAM(s) Oral daily  enoxaparin Injectable 40 milliGRAM(s) SubCutaneous every 24 hours  furosemide    Tablet 40 milliGRAM(s) Oral two times a day  isosorbide   mononitrate ER Tablet (IMDUR) 60 milliGRAM(s) Oral daily  sacubitril 97 mG/valsartan 103 mG 1 Tablet(s) Oral two times a day    MEDICATIONS  (PRN):      Allergies    No Known Allergies    Intolerances      FAMILY HISTORY:      REVIEW OF SYSTEMS:  General:  No wt loss, fevers, chills, night sweats  Eyes:  Good vision, no reported pain  ENT:  No sore throat, pain, runny nose, dysphagia  CV:  No pain, palpitatioins, hypo/hypertension  Resp:  No dyspnea, cough, tachypnea, wheezing  GI:  No pain, nausea, vomiting, diarrhea, constipatiion  :  No pain, bleeding, incontinence, nocturia  Muscle:  No pain, weakness  Breast:  No pain, abscess, mass, discharge  Neuro:  No weakness, tingling, memory problems  Psych:  No fatigue, insomnia, mood problems, depression  Endocrine:  No polyuria, polydypsia, cold/heat intolerance  Heme:  No petechiae, ecchymosis, easy bruisability  Skin:  No rash, tattoos, scars, edema      Vital Signs Last 24 Hrs  T(C): 36.7 (24 Nov 2023 11:40), Max: 37.1 (23 Nov 2023 23:47)  T(F): 98.1 (24 Nov 2023 11:40), Max: 98.8 (23 Nov 2023 23:47)  HR: 58 (24 Nov 2023 13:27) (51 - 78)  BP: 190/114 (24 Nov 2023 13:27) (125/86 - 229/141)  BP(mean): --  RR: 18 (24 Nov 2023 11:40) (12 - 18)  SpO2: 100% (24 Nov 2023 11:40) (95% - 100%)    Parameters below as of 23 Nov 2023 20:30  Patient On (Oxygen Delivery Method): room air        GENERAL PHYSICAL EXAM:  General:  Appears stated age, well-groomed, well-nourished, no distress  HEENT:  NC/AT, patent nares w/ pink mucosa, OP clear w/o lesions, PERRL, EOMI, conjunctivae clear, no thyromegaly, nodules, adenopathy, no JVD  Chest:  Full & symmetric excursion, no increased effort, breath sounds clear  Cardiovascular:  Regular rhythm, S1, S2, no murmur/rub/S3/S4, no carotid/femoral/abdominal bruit, radial/pedal pulses 2+, no edema  Abdomen:  Soft, non-tender, non-distended, normoactive bowel sounds, no HSM  Extremities:  Gait & station:   Digits:   Nails:   Joints, Bones, Muscles:   ROM:   Stability:  Skin:  No rash/erythema/ecchymoses/petechiae/wounds/abscess/warm/dry  Musculoskeletal:  Full ROM in all joints w/o swelling/tenderness/effusion    NEUROLOGICAL EXAM:  HENT:  Normocephalic head; atraumatic head.  Neck supple.  ENT: normal looking.  Mental State:    Alert.  Fully oriented to person, place and date.  Coherent.  Speech clear and intact.  Cooperative.  Responds appropriately.    Cranial Nerves:  II-XII:   Pupils round and reactive to light and accommodation.  Extraocular movements full.  Visual fields full (no homonymous hemianopsia).  Visual acuity wnl.  Facial symmetry intact.  Tongue midline.  Motor Functions:  Moves all extremities.   r drift  pronator drift of UE.  Claps hand well.  Hand  intact bilaterally.  Ambulatory.    Sensory Functions:   Intact to touch and pinprick to face and extremities.    Reflexes:  Deep tendon reflexes normoactive to biceps, knees and ankles.  Babinski absent.  Cerebellar Testing:    Finger to nose intact.  Nystagmus absent.  Neurovascular: Carotid auscultation full without bruits.      LABS:                        13.8   7.28  )-----------( 155      ( 23 Nov 2023 18:30 )             43.9     11-24    142  |  110<H>  |  13  ----------------------------<  121<H>  3.7   |  25  |  1.13    Ca    8.7      24 Nov 2023 05:59    TPro  7.2  /  Alb  3.1<L>  /  TBili  0.6  /  DBili  x   /  AST  15  /  ALT  21  /  AlkPhos  59  11-23    PT/INR - ( 23 Nov 2023 18:00 )   PT: 11.2 sec;   INR: 0.93 ratio         PTT - ( 23 Nov 2023 18:00 )  PTT:29.1 sec    Urinalysis Basic - ( 24 Nov 2023 05:59 )    Color: x / Appearance: x / SG: x / pH: x  Gluc: 121 mg/dL / Ketone: x  / Bili: x / Urobili: x   Blood: x / Protein: x / Nitrite: x   Leuk Esterase: x / RBC: x / WBC x   Sq Epi: x / Non Sq Epi: x / Bacteria: x        RADIOLOGY & ADDITIONAL STUDIES:      Assessment & Opinion: events noted htn obesity  r sided weakenss unsteady gait speech word finding difficulty , ct head noted cta noted left ica stenosis  ct perfusion noted ,  r drift r arm 4/5 left arm 5/5 r leg 4/5  , toes down ,     Recommendations:    Echocardiogram.     DVT prophylaxis as ordered. mri brain out pt, he cant fit in machine on asa and plavix echo , pt will follow,   Medications:

## 2023-11-24 NOTE — DIETITIAN INITIAL EVALUATION ADULT - PERTINENT MEDS FT
MEDICATIONS  (STANDING):  aspirin enteric coated 81 milliGRAM(s) Oral daily  atorvastatin 80 milliGRAM(s) Oral at bedtime  carvedilol 25 milliGRAM(s) Oral every 12 hours  clopidogrel Tablet 75 milliGRAM(s) Oral daily  enoxaparin Injectable 40 milliGRAM(s) SubCutaneous every 24 hours  furosemide    Tablet 40 milliGRAM(s) Oral two times a day  isosorbide   mononitrate ER Tablet (IMDUR) 120 milliGRAM(s) Oral daily  sacubitril 97 mG/valsartan 103 mG 1 Tablet(s) Oral two times a day    MEDICATIONS  (PRN):

## 2023-11-24 NOTE — PROGRESS NOTE ADULT - ASSESSMENT
43 year old male PMH HTN, HLD, CAD, CHF BIB wife concerned about stroke-like symptoms. Per wife, pt with  intermittent R sided numbness x 3 days, but sensation would return. Pt with onset R sided numbness 0900 AM, but pt thought sensation would return & so did not come to ED even though wife prompted. Wife noted pt with confusion, delayed answering of questions around 2 to 3 PM as well as mild slurring of words. Pt with elevated BP in ER >200/>100, FS 150s.     Acute CVA:  CT head confirms left MCA distribution infarct, no bleed. CTA notes high grade intracranial left ICA stenosis > 90 %, likely the cause of MCA infarct.   Added Plavix to ASA daily, change to Lipitor 80 mg/day  Unable to do MR as pt didn't fit the machine. need outpt MR    PT recc: outpt PT     HTN/ Chronic diastolic CHF:  - Continue home meds of Imdur, Coreg and Entresto regimen with daily Lasix.   - Echo with preserved EF, grade 3 diastolic dysfunction

## 2023-11-25 ENCOUNTER — TRANSCRIPTION ENCOUNTER (OUTPATIENT)
Age: 43
End: 2023-11-25

## 2023-11-25 VITALS — HEART RATE: 62 BPM

## 2023-11-25 LAB
A1C WITH ESTIMATED AVERAGE GLUCOSE RESULT: 6 % — HIGH (ref 4–5.6)
A1C WITH ESTIMATED AVERAGE GLUCOSE RESULT: 6 % — HIGH (ref 4–5.6)
ESTIMATED AVERAGE GLUCOSE: 126 MG/DL — HIGH (ref 68–114)
ESTIMATED AVERAGE GLUCOSE: 126 MG/DL — HIGH (ref 68–114)
VIT B12 SERPL-MCNC: 377 PG/ML — SIGNIFICANT CHANGE UP (ref 232–1245)
VIT B12 SERPL-MCNC: 377 PG/ML — SIGNIFICANT CHANGE UP (ref 232–1245)

## 2023-11-25 PROCEDURE — 99239 HOSP IP/OBS DSCHRG MGMT >30: CPT

## 2023-11-25 RX ORDER — CARVEDILOL PHOSPHATE 80 MG/1
1 CAPSULE, EXTENDED RELEASE ORAL
Qty: 0 | Refills: 0 | DISCHARGE
Start: 2023-11-25

## 2023-11-25 RX ORDER — ISOSORBIDE MONONITRATE 60 MG/1
1 TABLET, EXTENDED RELEASE ORAL
Qty: 0 | Refills: 0 | DISCHARGE
Start: 2023-11-25

## 2023-11-25 RX ORDER — ASPIRIN/CALCIUM CARB/MAGNESIUM 324 MG
1 TABLET ORAL
Qty: 30 | Refills: 0
Start: 2023-11-25 | End: 2023-12-24

## 2023-11-25 RX ORDER — FUROSEMIDE 40 MG
1 TABLET ORAL
Qty: 0 | Refills: 0 | DISCHARGE
Start: 2023-11-25

## 2023-11-25 RX ORDER — CLOPIDOGREL BISULFATE 75 MG/1
1 TABLET, FILM COATED ORAL
Qty: 30 | Refills: 0
Start: 2023-11-25 | End: 2023-12-24

## 2023-11-25 RX ORDER — ATORVASTATIN CALCIUM 80 MG/1
1 TABLET, FILM COATED ORAL
Qty: 30 | Refills: 0
Start: 2023-11-25 | End: 2023-12-24

## 2023-11-25 RX ORDER — SACUBITRIL AND VALSARTAN 24; 26 MG/1; MG/1
1 TABLET, FILM COATED ORAL
Qty: 0 | Refills: 0 | DISCHARGE
Start: 2023-11-25

## 2023-11-25 RX ADMIN — Medication 81 MILLIGRAM(S): at 12:14

## 2023-11-25 RX ADMIN — CLOPIDOGREL BISULFATE 75 MILLIGRAM(S): 75 TABLET, FILM COATED ORAL at 12:14

## 2023-11-25 RX ADMIN — Medication 40 MILLIGRAM(S): at 13:51

## 2023-11-25 RX ADMIN — Medication 40 MILLIGRAM(S): at 05:53

## 2023-11-25 RX ADMIN — SACUBITRIL AND VALSARTAN 1 TABLET(S): 24; 26 TABLET, FILM COATED ORAL at 05:53

## 2023-11-25 RX ADMIN — CARVEDILOL PHOSPHATE 25 MILLIGRAM(S): 80 CAPSULE, EXTENDED RELEASE ORAL at 05:53

## 2023-11-25 RX ADMIN — ISOSORBIDE MONONITRATE 60 MILLIGRAM(S): 60 TABLET, EXTENDED RELEASE ORAL at 12:14

## 2023-11-25 RX ADMIN — ENOXAPARIN SODIUM 40 MILLIGRAM(S): 100 INJECTION SUBCUTANEOUS at 13:51

## 2023-11-25 NOTE — PHYSICAL THERAPY INITIAL EVALUATION ADULT - DID THE PATIENT HAVE SURGERY?
This is the hx of A.N. a 44 y/o male patient who was admitted to [facility] due to complications of CVA affecting medical condition and with subsequent affection on functional mobility./n/a

## 2023-11-25 NOTE — PATIENT PROFILE ADULT - FALL HARM RISK - HARM RISK INTERVENTIONS

## 2023-11-25 NOTE — DISCHARGE NOTE NURSING/CASE MANAGEMENT/SOCIAL WORK - NSDCPEFALRISK_GEN_ALL_CORE
For information on Fall & Injury Prevention, visit: [email or web address] OR  [email or web address] OR  https://www.cdc.gov/steadi/patient.html

## 2023-11-25 NOTE — PHYSICAL THERAPY INITIAL EVALUATION ADULT - TRANSFER TRAINING, PT EVAL
To be able to perform transfers Independently, including bed to chair, chair to bed and chair to chair In order to facilitate safety with appropriate technique 2 weeks

## 2023-11-25 NOTE — DISCHARGE NOTE PROVIDER - NSDCCPCAREPLAN_GEN_ALL_CORE_FT
PRINCIPAL DISCHARGE DIAGNOSIS  Diagnosis: CVA (cerebrovascular accident)  Assessment and Plan of Treatment: 43 year old male PMH HTN, HLD, CAD, CHF BIB wife concerned about stroke-like symptoms. Per wife, pt with  intermittent R sided numbness x 3 days, but sensation would return. Pt with onset R sided numbness 0900 AM, but pt thought sensation would return & so did not come to ED even though wife prompted. Wife noted pt with confusion, delayed answering of questions around 2 to 3 PM as well as mild slurring of words. Pt with elevated BP in ER >200/>100, FS 150s.   Acute CVA:  CT head confirms left MCA distribution infarct, no bleed. CTA notes high grade intracranial left ICA stenosis > 90 %, likely the cause of MCA infarct.   Added Plavix to ASA daily, change to Lipitor 80 mg/day  Unable to do MR as pt didn't fit the machine. need outpt MR  PT recc: outpt PT   HTN/ Chronic diastolic CHF:  - Continue home meds of Imdur, Coreg and Entresto regimen with daily Lasix.   - Echo with preserved EF, grade 3 diastolic dysfunction

## 2023-11-25 NOTE — CHART NOTE - NSCHARTNOTEFT_GEN_A_CORE
[facility]       To Whom It May Concern,   Mr. Alexsander Vuong was admitted on 11/23/23, treated and discharged on 11/25/23 from [facility]. Pt can return to work after seen and cleared by PCP as outpt.  With /Without any restriction activity.  If any questions or concerns please call.                   Thank you.  Janis Stallworth MD  Hospitalist   Department of Medicine  [facility]

## 2023-11-25 NOTE — DISCHARGE NOTE PROVIDER - CARE PROVIDER_API CALL
pcp,   Phone: (   )    -  Fax: (   )    -  Follow Up Time:     Cornelia Ponce  Neurology  [address] [place] [id number]  Phone: [phone]  Fax: [phone]  Follow Up Time:

## 2023-11-25 NOTE — DISCHARGE NOTE PROVIDER - DETAILS OF MALNUTRITION DIAGNOSIS/DIAGNOSES
This patient has been assessed with a concern for Malnutrition and was treated during this hospitalization for the following Nutrition diagnosis/diagnoses:     -  11/24/2023: Morbid obesity (BMI > 40)

## 2023-11-25 NOTE — PHYSICAL THERAPY INITIAL EVALUATION ADULT - ADDITIONAL COMMENTS
As per patient he lives with spouse in a high [place] home with 13 KAREEM with R HR with one level set up. Pt reported he was Independent with ADLs/IADLs, independent with ambulation, pt was also working full time and driving. Pt reported he has no DME at home.

## 2023-11-25 NOTE — DISCHARGE NOTE PROVIDER - DISCHARGE DATE
25-Nov-2023 Otezla Counseling: The side effects of Otezla were discussed with the patient, including but not limited to worsening or new depression, weight loss, diarrhea, nausea, upper respiratory tract infection, and headache. Patient instructed to call the office should any adverse effect occur.  The patient verbalized understanding of the proper use and possible adverse effects of Otezla.  All the patient's questions and concerns were addressed.

## 2023-11-25 NOTE — DISCHARGE NOTE PROVIDER - ATTENDING DISCHARGE PHYSICAL EXAMINATION:
Vital Signs Last 24 Hrs  T(C): 36.6 (25 Nov 2023 10:15), Max: 37.1 (24 Nov 2023 16:38)  T(F): 97.8 (25 Nov 2023 10:15), Max: 98.8 (24 Nov 2023 16:38)  HR: 65 (25 Nov 2023 10:15) (54 - 65)  BP: 137/82 (25 Nov 2023 10:15) (137/82 - 190/114)  BP(mean): 135 (24 Nov 2023 16:38) (135 - 135)  RR: 18 (25 Nov 2023 10:15) (18 - 20)  SpO2: 96% (25 Nov 2023 10:15) (94% - 98%)    GENERAL: NAD, well-groomed, well-developed  HEAD:  Atraumatic, Normocephalic  EYES: EOMI, PERRLA, conjunctiva and sclera clear  ENMT: No tonsillar erythema, exudates, or enlargement; Moist mucous membranes, Good dentition, No lesions  NECK: Supple, No JVD, Normal thyroid  NERVOUS SYSTEM:  Alert & Oriented X3, mild expressive aphasia,   CHEST/LUNG: Clear to percussion bilaterally; No rales, rhonchi, wheezing, or rubs  HEART: Regular rate and rhythm; No murmurs, rubs, or gallops  ABDOMEN: Soft, Nontender, Nondistended; Bowel sounds present  EXTREMITIES:  2+ Peripheral Pulses, No clubbing, cyanosis, or edema  LYMPH: No lymphadenopathy noted  SKIN: No rashes or lesions

## 2023-11-25 NOTE — DISCHARGE NOTE NURSING/CASE MANAGEMENT/SOCIAL WORK - PATIENT PORTAL LINK FT
You can access the FollowMyHealth Patient Portal offered by [facility] by registering at the following website: [email or web address] By joining [employer]’s FollowMyHealth portal, you will also be able to view your health information using other applications (apps) compatible with our system.

## 2023-11-25 NOTE — PHYSICAL THERAPY INITIAL EVALUATION ADULT - PERTINENT HX OF CURRENT PROBLEM, REHAB EVAL
This is the hx of YUDELKA. a 44 y/o male patient who was admitted to [facility] due to complications of CVA affecting medical condition and with subsequent affection on functional mobility. CT Angio neck 11/23/23: Scattered areas of acute ischemia suggested within the inferior right   frontal lobes, suggesting right MCA/BUSHRA territory infarcts.

## 2023-11-25 NOTE — PHYSICAL THERAPY INITIAL EVALUATION ADULT - PLANNED THERAPY INTERVENTIONS, PT EVAL
To be able to perform stair negotiation with no device and 1 hand rails Independently to improve access and exiting from home and access to bedrooms, basement and bathrooms by 2 weeks/balance training/gait training/strengthening/transfer training

## 2023-11-25 NOTE — PHYSICAL THERAPY INITIAL EVALUATION ADULT - GAIT TRAINING, PT EVAL
To be able to perform ambulation independently using with no device for 200 feet, using proper technique using AD, with proper posture and functional distance at home in 2 weeks.

## 2023-12-01 DIAGNOSIS — E66.01 MORBID (SEVERE) OBESITY DUE TO EXCESS CALORIES: ICD-10-CM

## 2023-12-01 DIAGNOSIS — E78.5 HYPERLIPIDEMIA, UNSPECIFIED: ICD-10-CM

## 2023-12-01 DIAGNOSIS — Z79.02 LONG TERM (CURRENT) USE OF ANTITHROMBOTICS/ANTIPLATELETS: ICD-10-CM

## 2023-12-01 DIAGNOSIS — R29.702 NIHSS SCORE 2: ICD-10-CM

## 2023-12-01 DIAGNOSIS — I63.9 CEREBRAL INFARCTION, UNSPECIFIED: ICD-10-CM

## 2023-12-01 DIAGNOSIS — G81.91 HEMIPLEGIA, UNSPECIFIED AFFECTING RIGHT DOMINANT SIDE: ICD-10-CM

## 2023-12-01 DIAGNOSIS — I11.0 HYPERTENSIVE HEART DISEASE WITH HEART FAILURE: ICD-10-CM

## 2023-12-01 DIAGNOSIS — I25.10 ATHEROSCLEROTIC HEART DISEASE OF NATIVE CORONARY ARTERY WITHOUT ANGINA PECTORIS: ICD-10-CM

## 2023-12-01 DIAGNOSIS — Z79.82 LONG TERM (CURRENT) USE OF ASPIRIN: ICD-10-CM

## 2023-12-01 DIAGNOSIS — I50.32 CHRONIC DIASTOLIC (CONGESTIVE) HEART FAILURE: ICD-10-CM

## 2023-12-01 DIAGNOSIS — I63.512 CEREBRAL INFARCTION DUE TO UNSPECIFIED OCCLUSION OR STENOSIS OF LEFT MIDDLE CEREBRAL ARTERY: ICD-10-CM

## 2024-06-11 NOTE — ED ADULT TRIAGE NOTE - SPO2 (%)
Preoperative Consultation      Christina Thomas  YOB: 1975    Date of Service:  6/11/2024    Vitals:    06/11/24 1335   BP: 116/80   Pulse: 73   SpO2: 99%   Weight: 79.4 kg (175 lb)      Wt Readings from Last 2 Encounters:   06/11/24 79.4 kg (175 lb)   03/20/24 79.4 kg (175 lb)     BP Readings from Last 3 Encounters:   06/11/24 116/80   05/08/24 (!) 166/92   03/26/24 (!) 131/91        Chief Complaint   Patient presents with    Pre-op Exam     Allergies   Allergen Reactions    Oxycodone Hallucinations    Oxycodone-Acetaminophen Hallucinations     No outpatient medications have been marked as taking for the 6/11/24 encounter (Office Visit) with Jeffery Waters DO.       This patient presents to the office today for a preoperative consultation at the request of surgeon, Dr. Garvin, who plans on performing Vaginal hysterectomy with bilateral salpingectomy, vaginal vault suspension, anterior repair, posterior repair, and cystoscopy  on June 27 at [facility].  The current problem began 7 years ago, and symptoms have been worsening with time.  Conservative therapy: N/A.    Planned anesthesia: General   Known anesthesia problems: None   Bleeding risk: No recent or remote history of abnormal bleeding  Personal or FH of DVT/PE: No    Patient objection to receiving blood products: No    Patient Active Problem List   Diagnosis    Headache    Labor and delivery indication for care or intervention    Migraine    Hip impingement syndrome    Right ankle instability    Tenosynovitis of left ankle    Acquired hypothyroidism    Mixed hyperlipidemia    Complete uterine prolapse    Cystocele, midline    Rectocele       Past Medical History:   Diagnosis Date    Anemia     iron supplement    Headache(784.0)     Herpes simplex without mention of complication     last outbreak 2 months ago      Hypothyroidism      Past Surgical History:   Procedure Laterality Date    ACHILLES TENDON SURGERY Right  99

## 2024-06-19 NOTE — PROVIDER CONTACT NOTE (OTHER) - REASON
BP of 182/101 Spoke to daughter who says patient is doing well, no more low O2 levels. Pt's daughter knows to call with any changes

## 2024-07-02 ENCOUNTER — EMERGENCY (EMERGENCY)
Facility: HOSPITAL | Age: 44
LOS: 1 days | Discharge: HOME | End: 2024-07-02
Attending: EMERGENCY MEDICINE | Admitting: EMERGENCY MEDICINE
Payer: COMMERCIAL

## 2024-07-02 VITALS
DIASTOLIC BLOOD PRESSURE: 102 MMHG | RESPIRATION RATE: 16 BRPM | TEMPERATURE: 98 F | OXYGEN SATURATION: 99 % | SYSTOLIC BLOOD PRESSURE: 189 MMHG | HEART RATE: 96 BPM

## 2024-07-02 VITALS
SYSTOLIC BLOOD PRESSURE: 156 MMHG | HEART RATE: 52 BPM | RESPIRATION RATE: 18 BRPM | OXYGEN SATURATION: 99 % | TEMPERATURE: 98 F | DIASTOLIC BLOOD PRESSURE: 94 MMHG

## 2024-07-02 PROCEDURE — 73564 X-RAY EXAM KNEE 4 OR MORE: CPT | Mod: 26,LT

## 2024-07-02 PROCEDURE — 93010 ELECTROCARDIOGRAM REPORT: CPT

## 2024-07-02 PROCEDURE — 99284 EMERGENCY DEPT VISIT MOD MDM: CPT

## 2024-07-02 RX ORDER — ISOSORBIDE DINITRATE 5 MG/1
20 TABLET ORAL ONCE
Refills: 0 | Status: COMPLETED | OUTPATIENT
Start: 2024-07-02 | End: 2024-07-02

## 2024-07-02 RX ORDER — FUROSEMIDE 10 MG/ML
80 INJECTION, SOLUTION INTRAMUSCULAR; INTRAVENOUS ONCE
Refills: 0 | Status: COMPLETED | OUTPATIENT
Start: 2024-07-02 | End: 2024-07-02

## 2024-07-02 RX ORDER — CARVEDILOL PHOSPHATE 80 MG/1
25 CAPSULE, EXTENDED RELEASE ORAL DAILY
Refills: 0 | Status: DISCONTINUED | OUTPATIENT
Start: 2024-07-02 | End: 2024-07-05

## 2024-07-02 RX ORDER — ACETAMINOPHEN 325 MG
2 TABLET ORAL
Qty: 30 | Refills: 0
Start: 2024-07-02

## 2024-07-02 RX ADMIN — Medication 400 MILLIGRAM(S): at 11:29

## 2024-07-02 RX ADMIN — FUROSEMIDE 80 MILLIGRAM(S): 10 INJECTION, SOLUTION INTRAMUSCULAR; INTRAVENOUS at 11:15

## 2024-07-02 RX ADMIN — ISOSORBIDE DINITRATE 20 MILLIGRAM(S): 5 TABLET ORAL at 11:15

## 2024-07-02 RX ADMIN — CARVEDILOL PHOSPHATE 25 MILLIGRAM(S): 80 CAPSULE, EXTENDED RELEASE ORAL at 11:15

## 2024-07-03 ENCOUNTER — APPOINTMENT (OUTPATIENT)
Dept: ORTHOPEDIC SURGERY | Facility: CLINIC | Age: 44
End: 2024-07-03
Payer: COMMERCIAL

## 2024-07-03 VITALS — WEIGHT: 315 LBS | BODY MASS INDEX: 38.36 KG/M2 | HEIGHT: 76 IN

## 2024-07-03 DIAGNOSIS — M17.12 UNILATERAL PRIMARY OSTEOARTHRITIS, LEFT KNEE: ICD-10-CM

## 2024-07-03 DIAGNOSIS — M70.42 PREPATELLAR BURSITIS, LEFT KNEE: ICD-10-CM

## 2024-07-03 PROCEDURE — 73564 X-RAY EXAM KNEE 4 OR MORE: CPT | Mod: LT

## 2024-07-03 PROCEDURE — 99204 OFFICE O/P NEW MOD 45 MIN: CPT

## 2024-07-03 RX ORDER — METHYLPREDNISOLONE 4 MG/1
4 TABLET ORAL
Qty: 1 | Refills: 0 | Status: ACTIVE | COMMUNITY
Start: 2024-07-03 | End: 1900-01-01

## 2024-08-07 ENCOUNTER — APPOINTMENT (OUTPATIENT)
Dept: ORTHOPEDIC SURGERY | Facility: CLINIC | Age: 44
End: 2024-08-07

## 2024-08-07 PROCEDURE — 99214 OFFICE O/P EST MOD 30 MIN: CPT | Mod: 25

## 2024-08-07 PROCEDURE — 20610 DRAIN/INJ JOINT/BURSA W/O US: CPT | Mod: LT

## 2024-08-07 NOTE — HISTORY OF PRESENT ILLNESS
[de-identified] : 44 year old male  (disabled  )  chronic left knee pain sincer 2023 worsening since june 2024 with no HEATH The pain is located  anterior The pain is associated with swelling Worse with walking activity and better at rest. Has tried icing, Advil uses SAC for ambulation H/O stroke 11/2023 R lower ext weakness still  8/7/24- doing PT at [facility] in [facility], cont to have pain worse with activit

## 2024-08-07 NOTE — IMAGING
[de-identified] :  LEFT KNEE Inspection:  mild effusion Palpation: medial joint line tenderness, anterior tenderness Knee Range of Motion:  3-125  Strength: 5/5 Quadriceps strength, 5/5 Hamstring strength Neurological: light touch is intact throughout Ligament Stability and Special Tests:  McMurrays: neg Lachman: neg Pivot Shift: neg Posterior Drawer: neg Valgus: neg Varus: neg Patella Apprehension: neg Patella Maltracking: neg

## 2024-08-07 NOTE — ASSESSMENT
[FreeTextEntry1] :    exacerbation of underlying arthritis    - We discussed their diagnosis and treatment options at length including the risks and benefits of both surgical treatment with a knee replacement and non-surgical options. Surgical risks include but are not limited to pain, infection, bleeding, vascular injury, numbness, tingling, nerve damage. - Due to risks of surgery, they will continue conservative treatment with PT, icing, and anti-inflammatory medications - The patient was provided with a PT prescription to work on ROM, hip ER/abductors strengthening, quad/hamstring stretches and strengthening, and other exercises - The patient was advised to let pain guide the gradual advancement of activities. - The patient was advised to apply ice (wrapped in a towel or protective covering) to the area daily (20 minutes at a time, 2-4X/day). - We also discussed the possible of a corticosteroid injection in order to help decrease inflammation and pain so that they can perform better therapy. - The risks, benefits, and alternatives to corticosteroid injection were reviewed with the patient and they wished to proceed with this treatment course. - Follow up as needed in 6 weeks to re-evaluate, if no improvement we spoke about possibility of viscosupplementation injections    Medication Discussion: 1) We discussed a comprehensive treatment plan that included possible pharmaceutical management involving the use of prescription strength medications including but not limited to options such as oral Naprosyn 500mg BID, once daily Meloxicam 15 mg, or 500-650 mg Tylenol versus over the counter oral medications in addition to discussing possible topical prescription Pennsaid vs  Voltaren gel. 2) There is a moderate risk of morbidity with further treatment, especially from use of prescription strength medications and possible side effects of these medications which include but are not limited to upset stomach with oral medications, skin reactions to topical medications and GI/cardiac/renal issues with long term use. 3) I recommended that the patient follow-up with their medical physician if there are any significant potential issues with long term medication use such as interactions with current medications or with exacerbation of underlying medical comorbidities. 4) The benefits and risks associated with use of oral and / or topical prescription and over the counter anti-inflammatory medications were discussed with the patient. The patient voiced understanding of the risks including but not limited to bleeding, stroke, kidney dysfunction, heart disease, and were referred to the black box warning label for further information.

## 2024-10-04 NOTE — PATIENT PROFILE ADULT - HARM RISK FACTORS
Goal Outcome Evaluation:            Pt admitted to OBS, pt oriented to room, admission questions complete, pt resting in bed.                                    no

## 2025-07-13 ENCOUNTER — INPATIENT (INPATIENT)
Facility: HOSPITAL | Age: 45
LOS: 4 days | Discharge: HOME | End: 2025-07-18
Attending: STUDENT IN AN ORGANIZED HEALTH CARE EDUCATION/TRAINING PROGRAM | Admitting: STUDENT IN AN ORGANIZED HEALTH CARE EDUCATION/TRAINING PROGRAM
Payer: COMMERCIAL

## 2025-07-13 VITALS
HEART RATE: 143 BPM | HEIGHT: 75 IN | SYSTOLIC BLOOD PRESSURE: 119 MMHG | DIASTOLIC BLOOD PRESSURE: 87 MMHG | TEMPERATURE: 98 F | OXYGEN SATURATION: 98 % | RESPIRATION RATE: 16 BRPM | WEIGHT: 287.04 LBS

## 2025-07-13 DIAGNOSIS — I47.10 SUPRAVENTRICULAR TACHYCARDIA, UNSPECIFIED: ICD-10-CM

## 2025-07-13 DIAGNOSIS — E78.5 HYPERLIPIDEMIA, UNSPECIFIED: ICD-10-CM

## 2025-07-13 DIAGNOSIS — Z29.9 ENCOUNTER FOR PROPHYLACTIC MEASURES, UNSPECIFIED: ICD-10-CM

## 2025-07-13 DIAGNOSIS — I25.10 ATHEROSCLEROTIC HEART DISEASE OF NATIVE CORONARY ARTERY WITHOUT ANGINA PECTORIS: ICD-10-CM

## 2025-07-13 DIAGNOSIS — Z86.73 PERSONAL HISTORY OF TRANSIENT ISCHEMIC ATTACK (TIA), AND CEREBRAL INFARCTION WITHOUT RESIDUAL DEFICITS: ICD-10-CM

## 2025-07-13 DIAGNOSIS — I10 ESSENTIAL (PRIMARY) HYPERTENSION: ICD-10-CM

## 2025-07-13 LAB
ALBUMIN SERPL ELPH-MCNC: 3.8 G/DL — SIGNIFICANT CHANGE UP (ref 3.3–5)
ALP SERPL-CCNC: 58 U/L — SIGNIFICANT CHANGE UP (ref 40–120)
ALT FLD-CCNC: 7 U/L — SIGNIFICANT CHANGE UP (ref 4–41)
ANION GAP SERPL CALC-SCNC: 13 MMOL/L — SIGNIFICANT CHANGE UP (ref 7–14)
APTT BLD: 28.6 SEC — SIGNIFICANT CHANGE UP (ref 26.1–36.8)
AST SERPL-CCNC: 12 U/L — SIGNIFICANT CHANGE UP (ref 4–40)
BASOPHILS # BLD AUTO: 0.05 K/UL — SIGNIFICANT CHANGE UP (ref 0–0.2)
BASOPHILS NFR BLD AUTO: 0.7 % — SIGNIFICANT CHANGE UP (ref 0–2)
BILIRUB SERPL-MCNC: 0.8 MG/DL — SIGNIFICANT CHANGE UP (ref 0.2–1.2)
BLOOD GAS VENOUS COMPREHENSIVE RESULT: SIGNIFICANT CHANGE UP
BUN SERPL-MCNC: 12 MG/DL — SIGNIFICANT CHANGE UP (ref 7–23)
CALCIUM SERPL-MCNC: 8.8 MG/DL — SIGNIFICANT CHANGE UP (ref 8.4–10.5)
CHLORIDE SERPL-SCNC: 106 MMOL/L — SIGNIFICANT CHANGE UP (ref 98–107)
CO2 SERPL-SCNC: 22 MMOL/L — SIGNIFICANT CHANGE UP (ref 22–31)
CREAT SERPL-MCNC: 1.3 MG/DL — SIGNIFICANT CHANGE UP (ref 0.5–1.3)
EGFR: 69 ML/MIN/1.73M2 — SIGNIFICANT CHANGE UP
EGFR: 69 ML/MIN/1.73M2 — SIGNIFICANT CHANGE UP
EOSINOPHIL # BLD AUTO: 0.05 K/UL — SIGNIFICANT CHANGE UP (ref 0–0.5)
EOSINOPHIL NFR BLD AUTO: 0.7 % — SIGNIFICANT CHANGE UP (ref 0–6)
GLUCOSE SERPL-MCNC: 110 MG/DL — HIGH (ref 70–99)
HCT VFR BLD CALC: 49.6 % — SIGNIFICANT CHANGE UP (ref 39–50)
HGB BLD-MCNC: 15.7 G/DL — SIGNIFICANT CHANGE UP (ref 13–17)
IMM GRANULOCYTES # BLD AUTO: 0.02 K/UL — SIGNIFICANT CHANGE UP (ref 0–0.07)
IMM GRANULOCYTES NFR BLD AUTO: 0.3 % — SIGNIFICANT CHANGE UP (ref 0–0.9)
INR BLD: 1.03 RATIO — SIGNIFICANT CHANGE UP (ref 0.85–1.16)
LYMPHOCYTES # BLD AUTO: 1.3 K/UL — SIGNIFICANT CHANGE UP (ref 1–3.3)
LYMPHOCYTES NFR BLD AUTO: 18.2 % — SIGNIFICANT CHANGE UP (ref 13–44)
MAGNESIUM SERPL-MCNC: 1.9 MG/DL — SIGNIFICANT CHANGE UP (ref 1.6–2.6)
MCHC RBC-ENTMCNC: 24.3 PG — LOW (ref 27–34)
MCHC RBC-ENTMCNC: 31.7 G/DL — LOW (ref 32–36)
MCV RBC AUTO: 76.9 FL — LOW (ref 80–100)
MONOCYTES # BLD AUTO: 0.52 K/UL — SIGNIFICANT CHANGE UP (ref 0–0.9)
MONOCYTES NFR BLD AUTO: 7.3 % — SIGNIFICANT CHANGE UP (ref 2–14)
NEUTROPHILS # BLD AUTO: 5.22 K/UL — SIGNIFICANT CHANGE UP (ref 1.8–7.4)
NEUTROPHILS NFR BLD AUTO: 72.8 % — SIGNIFICANT CHANGE UP (ref 43–77)
NRBC # BLD AUTO: 0 K/UL — SIGNIFICANT CHANGE UP (ref 0–0)
NRBC # FLD: 0 K/UL — SIGNIFICANT CHANGE UP (ref 0–0)
NT-PROBNP SERPL-SCNC: 2760 PG/ML — HIGH
PLATELET # BLD AUTO: 154 K/UL — SIGNIFICANT CHANGE UP (ref 150–400)
PMV BLD: SIGNIFICANT CHANGE UP FL (ref 7–13)
POTASSIUM SERPL-MCNC: 3.9 MMOL/L — SIGNIFICANT CHANGE UP (ref 3.5–5.3)
POTASSIUM SERPL-SCNC: 3.9 MMOL/L — SIGNIFICANT CHANGE UP (ref 3.5–5.3)
PROT SERPL-MCNC: 7 G/DL — SIGNIFICANT CHANGE UP (ref 6–8.3)
PROTHROM AB SERPL-ACNC: 12.2 SEC — SIGNIFICANT CHANGE UP (ref 9.9–13.4)
RBC # BLD: 6.45 M/UL — HIGH (ref 4.2–5.8)
RBC # FLD: 15.4 % — HIGH (ref 10.3–14.5)
SODIUM SERPL-SCNC: 141 MMOL/L — SIGNIFICANT CHANGE UP (ref 135–145)
TROPONIN T, HIGH SENSITIVITY RESULT: 43 NG/L — SIGNIFICANT CHANGE UP
TROPONIN T, HIGH SENSITIVITY RESULT: 47 NG/L — SIGNIFICANT CHANGE UP
WBC # BLD: 7.16 K/UL — SIGNIFICANT CHANGE UP (ref 3.8–10.5)
WBC # FLD AUTO: 7.16 K/UL — SIGNIFICANT CHANGE UP (ref 3.8–10.5)

## 2025-07-13 PROCEDURE — 71045 X-RAY EXAM CHEST 1 VIEW: CPT | Mod: 26

## 2025-07-13 PROCEDURE — 71275 CT ANGIOGRAPHY CHEST: CPT | Mod: 26

## 2025-07-13 PROCEDURE — 99223 1ST HOSP IP/OBS HIGH 75: CPT

## 2025-07-13 PROCEDURE — 99291 CRITICAL CARE FIRST HOUR: CPT

## 2025-07-13 RX ORDER — FUROSEMIDE 10 MG/ML
40 INJECTION INTRAMUSCULAR; INTRAVENOUS DAILY
Refills: 0 | Status: DISCONTINUED | OUTPATIENT
Start: 2025-07-13 | End: 2025-07-18

## 2025-07-13 RX ORDER — METOPROLOL SUCCINATE 50 MG/1
5 TABLET, EXTENDED RELEASE ORAL ONCE
Refills: 0 | Status: COMPLETED | OUTPATIENT
Start: 2025-07-13 | End: 2025-07-13

## 2025-07-13 RX ORDER — ADENOSINE 3 MG/ML
6 INJECTION, SOLUTION INTRAVENOUS ONCE
Refills: 0 | Status: DISCONTINUED | OUTPATIENT
Start: 2025-07-13 | End: 2025-07-13

## 2025-07-13 RX ORDER — APIXABAN 5 MG/1
5 TABLET, FILM COATED ORAL
Refills: 0 | Status: DISCONTINUED | OUTPATIENT
Start: 2025-07-13 | End: 2025-07-18

## 2025-07-13 RX ORDER — MAGNESIUM SULFATE 500 MG/ML
1 SYRINGE (ML) INJECTION ONCE
Refills: 0 | Status: COMPLETED | OUTPATIENT
Start: 2025-07-13 | End: 2025-07-13

## 2025-07-13 RX ORDER — METOPROLOL SUCCINATE 50 MG/1
50 TABLET, EXTENDED RELEASE ORAL THREE TIMES A DAY
Refills: 0 | Status: DISCONTINUED | OUTPATIENT
Start: 2025-07-13 | End: 2025-07-17

## 2025-07-13 RX ORDER — ASPIRIN 325 MG
81 TABLET ORAL DAILY
Refills: 0 | Status: DISCONTINUED | OUTPATIENT
Start: 2025-07-13 | End: 2025-07-18

## 2025-07-13 RX ORDER — SACUBITRIL AND VALSARTAN 6; 6 MG/1; MG/1
1 PELLET ORAL
Refills: 0 | Status: DISCONTINUED | OUTPATIENT
Start: 2025-07-13 | End: 2025-07-15

## 2025-07-13 RX ORDER — METOPROLOL SUCCINATE 50 MG/1
50 TABLET, EXTENDED RELEASE ORAL THREE TIMES A DAY
Refills: 0 | Status: DISCONTINUED | OUTPATIENT
Start: 2025-07-13 | End: 2025-07-13

## 2025-07-13 RX ADMIN — METOPROLOL SUCCINATE 50 MILLIGRAM(S): 50 TABLET, EXTENDED RELEASE ORAL at 22:48

## 2025-07-13 RX ADMIN — METOPROLOL SUCCINATE 5 MILLIGRAM(S): 50 TABLET, EXTENDED RELEASE ORAL at 15:30

## 2025-07-13 RX ADMIN — Medication 20 MILLIEQUIVALENT(S): at 12:46

## 2025-07-13 RX ADMIN — SACUBITRIL AND VALSARTAN 1 TABLET(S): 6; 6 PELLET ORAL at 22:47

## 2025-07-13 RX ADMIN — Medication 100 GRAM(S): at 12:47

## 2025-07-13 RX ADMIN — APIXABAN 5 MILLIGRAM(S): 5 TABLET, FILM COATED ORAL at 16:56

## 2025-07-13 RX ADMIN — METOPROLOL SUCCINATE 50 MILLIGRAM(S): 50 TABLET, EXTENDED RELEASE ORAL at 15:23

## 2025-07-13 NOTE — ED PROVIDER NOTE - PROGRESS NOTE DETAILS
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  Will come see patient. Kellee Urban MD, EM Attending: pt spontaneously converted to 114, will get rpt EKG now Kellee Urban MD, EM Attending:  cards at bedside, rpt ekg shows variable block, alternates between 2:1 and 3:1 flutter, cards recommends electrolyte repletion, will call back w/ further recs. pt will need to be admitted. Kellee Urban MD, EM Attending: pt spontaneously converted to 114, did not give adenosine, will get rpt EKG now Kellee Urban MD, EM Attending:  cardiology recommends metoprolol 50 mg 3 times daily, n.p.o. after midnight for possible intervention tomorrow, echo.  Pending CTA, if CTA shows no PE will give metoprolol and admit to medicine JOSE EDUARDO Choudhury: Signed out to me pending CT angio to rule out pulmonary embolism given presentation of SVT and EKG a flutter with variable AV block.  CT angio performed with impression no pulmonary embolus.  Cardiomegaly.  Hypertrophic of the left and right ventricles.  Further evaluation can be performed with cardiac MRI.  Patient was seen by electrophysiology with recommendations for admission to telemetry, continuation of Eliquis and aspirin, Entresto repeat TTE and metoprolol tartrate 50 mg 3 times daily.,  Possible KIT/DCCV in a.m. so patient to remain n.p.o. after midnight.  Patient is aware of plan, amenable to admission. Kellee Urban MD, EM Attending:  pt had an episode of SVT w/ rate in the 140s prior to PO metoprolol, was given 5mg IV metoprolol and 50 po tartrate with improvement in heart rate and rhythm back to 110s w/ variable block 2:1 - 3:1 flutter. pt asymptomatic during episode

## 2025-07-13 NOTE — CONSULT NOTE ADULT - SUBJECTIVE AND OBJECTIVE BOX
Cardiology Consult Note   [Please check [email or web address] password: [email or web address]" for cardiology service schedule and contact information]    HPI:  46 y/o M PMH HTN, HLD, CAD, CHF, CVA, pAfib on Eliquis (last taken last night 1800) c/o elevated HR noted on apple watch since yesterday. Pt states he otherwise feels well but HR has sustained >120-140 for the past day. Denies fever, chills, recent illness, CP, SOB, LEACH, abdominal pain, N/V/D, le edema    In the ED  but pt otherwise HD stable. 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 Planned to receive adenosine but pt spontaneously converted to sinus tachycardia. Labs still pending.       PAST MEDICAL & SURGICAL HISTORY:  Congestive heart failure      HTN (hypertension)      HLD (hyperlipidemia)      No significant past surgical history        FAMILY HISTORY:    SOCIAL HISTORY:  unchanged    MEDICATIONS:  aDENosine Injectable (ADENOCARD) 6 milliGRAM(s) IV Push once                    -------------------------------------------------------------------------------------------  PHYSICAL EXAM:  T(C): 36.6 (07-13-25 @ 10:31), Max: 36.6 (07-13-25 @ 10:31)  HR: 145 (07-13-25 @ 10:58) (143 - 145)  BP: 119/92 (07-13-25 @ 10:58) (119/87 - 119/92)  RR: 18 (07-13-25 @ 10:58) (16 - 18)  SpO2: 100% (07-13-25 @ 10:58) (98% - 100%)  Wt(kg): --  I&O's Summary      GENERAL: NAD  HEAD: Atraumatic, Normocephalic.  ENT: Moist mucous membranes.  NECK: Supple, No JVD.  CHEST/LUNG: Clear to auscultation bilaterally; No rales, rhonchi, wheezing, or rubs. Unlabored respirations.  HEART: Regular rate and rhythm; No murmurs, rubs, or gallops.  ABDOMEN: Bowel sounds present; Soft, Nontender, Nondistended.   EXTREMITIES:  2+ Peripheral Pulses, brisk capillary refill. No clubbing, cyanosis, or edema.    -------------------------------------------------------------------------------------------  LABS:                          -------------------------------------------------------------------------------------------  Cardiovascular Diagnostic Testing:    ECG:     Echo:     Stress Testing:    Cath:    -------------------------------------------------------------------------------------------                 Cardiology Consult Note   [Please check [email or web address] password: [email or web address]" for cardiology service schedule and contact information]    HPI:  46 y/o M PMH HTN, HLD, CAD, CHF, CVA, pAfib on Eliquis (last taken last night 1800) c/o elevated HR noted on apple watch since yesterday. Pt states he otherwise feels well but HR has sustained >120-140 for the past day. Denies fever, chills, recent illness, CP, SOB, LEACH, abdominal pain, N/V/D, le edema    In the ED  but pt otherwise HD stable. ECG c/w narrow complex regular tachycardia no clear P waves. Most likely aflutter at times dropping to variable conduction. Initial labs unremarkable. Pt notes feeling completely asymptomatic at this time. No recent illnesses. Did recently travel, returned from [facility] last Monday. Home meds are eliquis, aspirin, coreg 12.5mg BID, lasix (20mg?) daily, entresto. Took all his meds last night around 8PM.       PAST MEDICAL & SURGICAL HISTORY:  Congestive heart failure      HTN (hypertension)      HLD (hyperlipidemia)      No significant past surgical history        FAMILY HISTORY:    SOCIAL HISTORY:  unchanged    MEDICATIONS:  aDENosine Injectable (ADENOCARD) 6 milliGRAM(s) IV Push once                    -------------------------------------------------------------------------------------------  PHYSICAL EXAM:  T(C): 36.6 (07-13-25 @ 10:31), Max: 36.6 (07-13-25 @ 10:31)  HR: 145 (07-13-25 @ 10:58) (143 - 145)  BP: 119/92 (07-13-25 @ 10:58) (119/87 - 119/92)  RR: 18 (07-13-25 @ 10:58) (16 - 18)  SpO2: 100% (07-13-25 @ 10:58) (98% - 100%)  Wt(kg): --  I&O's Summary      GENERAL: NAD  HEAD: Atraumatic, Normocephalic.  ENT: Moist mucous membranes.  NECK: Supple, No JVD.  CHEST/LUNG: Clear to auscultation bilaterally; No rales, rhonchi, wheezing, or rubs. Unlabored respirations.  HEART: Tachycardic with regular rhythm; No murmurs, rubs, or gallops.  ABDOMEN: Bowel sounds present; Soft, Nontender, Nondistended.   EXTREMITIES:  2+ Peripheral Pulses, brisk capillary refill. No clubbing, cyanosis, or edema.    -------------------------------------------------------------------------------------------  LABS:                          -------------------------------------------------------------------------------------------  Cardiovascular Diagnostic Testing:    ECG:     Echo:     Stress Testing:    Cath:    -------------------------------------------------------------------------------------------                 Cardiology Consult Note   [Please check [email or web address] password: "[email or web address]" for cardiology service schedule and contact information]    HPI:  44 y/o M PMH HTN, HLD, CAD, CHF, CVA in 2023, pAfib on Eliquis (last taken last night 1800) c/o elevated HR noted on apple watch since yesterday. Pt states he otherwise feels well but HR has sustained >120-140 for the past day. Denies fever, chills, recent illness, CP, SOB, LEACH, abdominal pain, N/V/D, le edema    In the ED  but pt otherwise HD stable. ECG c/w narrow complex regular tachycardia no clear P waves. Most likely aflutter at times dropping to variable conduction. Initial labs unremarkable. Pt notes feeling completely asymptomatic at this time. No recent illnesses. Did recently travel, returned from [facility] last Monday. Home meds are eliquis, aspirin, coreg 12.5mg BID, lasix (20mg?) daily, entresto. Took all his meds last night around 8PM.       PAST MEDICAL & SURGICAL HISTORY:  Congestive heart failure      HTN (hypertension)      HLD (hyperlipidemia)      No significant past surgical history        FAMILY HISTORY:    SOCIAL HISTORY:  unchanged    MEDICATIONS:  aDENosine Injectable (ADENOCARD) 6 milliGRAM(s) IV Push once                    -------------------------------------------------------------------------------------------  PHYSICAL EXAM:  T(C): 36.6 (07-13-25 @ 10:31), Max: 36.6 (07-13-25 @ 10:31)  HR: 145 (07-13-25 @ 10:58) (143 - 145)  BP: 119/92 (07-13-25 @ 10:58) (119/87 - 119/92)  RR: 18 (07-13-25 @ 10:58) (16 - 18)  SpO2: 100% (07-13-25 @ 10:58) (98% - 100%)  Wt(kg): --  I&O's Summary      GENERAL: NAD  HEAD: Atraumatic, Normocephalic.  ENT: Moist mucous membranes.  NECK: Supple, No JVD.  CHEST/LUNG: Clear to auscultation bilaterally; No rales, rhonchi, wheezing, or rubs. Unlabored respirations.  HEART: Tachycardic with regular rhythm; No murmurs, rubs, or gallops.  ABDOMEN: Bowel sounds present; Soft, Nontender, Nondistended.   EXTREMITIES:  2+ Peripheral Pulses, brisk capillary refill. No clubbing, cyanosis, or edema.    -------------------------------------------------------------------------------------------  LABS:                          -------------------------------------------------------------------------------------------  Cardiovascular Diagnostic Testing:    ECG:     Echo:     Stress Testing:    Cath:    -------------------------------------------------------------------------------------------                 Cardiology Consult Note   [Please check [email or web address] password: "[email or web address]" for cardiology service schedule and contact information]    HPI:  46 y/o M PMH HTN, HLD, CAD, CHF, CVA in 2023, pAfib on Eliquis (last taken last night 1800) c/o elevated HR noted on apple watch since yesterday. Pt states he otherwise feels well but HR has sustained >120-140 for the past day. Denies fever, chills, recent illness, CP, SOB, LEACH, abdominal pain, N/V/D, le edema    In the ED  but pt otherwise HD stable. ECG c/w narrow complex regular tachycardia no clear P waves. Most likely aflutter at times dropping to variable conduction. Initial labs unremarkable. Pt notes feeling completely asymptomatic at this time. No recent illnesses. Did recently travel, returned from [facility] last Monday. Home meds are eliquis, aspirin, coreg 12.5mg BID, lasix (20mg?) daily, entresto. Took all his meds last night around 8PM. He notes that he will have episodes of afib once or twice a year where his HR will get rapid for an hour or two and then break on its own, but with these episodes the HR usually goes up and down. This time the HR was sustaining high.       PAST MEDICAL & SURGICAL HISTORY:  Congestive heart failure      HTN (hypertension)      HLD (hyperlipidemia)      No significant past surgical history        FAMILY HISTORY:    SOCIAL HISTORY:  unchanged    MEDICATIONS:  aDENosine Injectable (ADENOCARD) 6 milliGRAM(s) IV Push once                    -------------------------------------------------------------------------------------------  PHYSICAL EXAM:  T(C): 36.6 (07-13-25 @ 10:31), Max: 36.6 (07-13-25 @ 10:31)  HR: 145 (07-13-25 @ 10:58) (143 - 145)  BP: 119/92 (07-13-25 @ 10:58) (119/87 - 119/92)  RR: 18 (07-13-25 @ 10:58) (16 - 18)  SpO2: 100% (07-13-25 @ 10:58) (98% - 100%)  Wt(kg): --  I&O's Summary      GENERAL: NAD  HEAD: Atraumatic, Normocephalic.  ENT: Moist mucous membranes.  NECK: Supple, No JVD.  CHEST/LUNG: Clear to auscultation bilaterally; No rales, rhonchi, wheezing, or rubs. Unlabored respirations.  HEART: Tachycardic with regular rhythm; No murmurs, rubs, or gallops.  ABDOMEN: Bowel sounds present; Soft, Nontender, Nondistended.   EXTREMITIES:  2+ Peripheral Pulses, brisk capillary refill. No clubbing, cyanosis, or edema.    -------------------------------------------------------------------------------------------  LABS:                          -------------------------------------------------------------------------------------------  Cardiovascular Diagnostic Testing:    ECG:     Echo:     Stress Testing:    Cath:    -------------------------------------------------------------------------------------------

## 2025-07-13 NOTE — ED PROVIDER NOTE - ATTENDING APP SHARED VISIT CONTRIBUTION OF CARE
45-year-old male past medical history of hypertension, CHF, paroxysmal A-fib on Eliquis, CAD, CVA with left-sided deficits, hyperlipidemia, is presenting for evaluation of a fast heart rate onset yesterday evening.  Patient states he forgot his Apple watch while at work, came home put it on him and found his heart rate to be elevated in the 130s.  Patient reports feeling well with no chest pain shortness of breath dyspnea on exertion abdominal pain nausea vomiting lightheadedness dizziness headaches fevers chills.  No recent nausea vomiting or illness.; recent travel to [place].   Patient last took his Eliquis last night 6 PM.  Patient went to bed and when he woke up in the morning his heart rate was still fast prompting his presentation to ED today.  Did not take morning meds.  Is on baby aspirin, Entresto furosemide.  Does follow with "Dr. Mccray"      EKG shows SVT with no ST depressions or elevations at a rate of 143 QRS 98 QTc 518.    Patient's blood pressure appropriate at 119/92.    GENERAL: well appearing   HEAD: normocephalic, atraumatic  HEENT: normal conjunctiva, oral mucosa moist, uvula midline  CARDIAC: tachycardia,  no appreciable murmurs,   PULM: normal breath sounds, clear to ascultation bilaterally, no rales, rhonchi, wheezing  GI: abdomen nondistended, soft, nontender, no guarding, no rebound tenderness  NEURO: AAOx3  MSK: trace peripheral edema, no calf tenderness b/l  SKIN: well-perfused, extremities warm, no visible rashes  PSYCH: appropriate mood and affect    attempted vagal maneuvers without success.     Differential diagnosis includes but not limited to SVT due to CHF/structural heart changes versus electrolyte derangement versus ischemia vs pe.  Plan for EP consult, likely cardioversion will place patient on pads and have crash cart at bedside, labs chest x-ray CTA chest.  Will hold off on fluids given patient's history of CHF.  Discussed need for adenosine with patient at bedside, patient consents to adenosine for cardioversion.  Will discuss with EP prior to administering.    I performed a history and physical exam of the patient and discussed their management with the PA. I reviewed the PA's note and agree with the documented findings and plan of care. I have edited as appropriate. My medical decision making and observations are found above.    Critical care billing:  Upon my evaluation, this patient had a high probability of imminent or life-threatening deterioration due to SVT, which required my direct attention, intervention, and personal management.  The patient has a  medical condition that impairs one or more vital organ systems.  Frequent personal assessment and adjustment of medical interventions was performed.    I have personally provided 30  minutes of critical care time exclusive of time spent on separately billable procedures. Time includes review of any laboratory data, radiology results, discussion with consultants, patient and/or family; monitoring for potential decompensation, as well as time spent retrieving data and reviewing the chart and documenting the visit. Interventions were performed as documented above.

## 2025-07-13 NOTE — ED ADULT NURSE NOTE - OBJECTIVE STATEMENT
JOSH RN: Received pt to Rm 19 from home with c/o elevated heart rate. Pt was resting when his apple watch alerted him that his heart rate was greater than 140 bpm. Pt is A&OX4, skin warm dry unremarkable, + strong rapid regular radial pulses bi laterally. Pt changed into gown and placed on cardiac monitor showing tachy irregular rhythm. MD at bedside for eval. #18g IV placed to L forearm, lab work collected as ordered. Pt reports hx of CVA (on Eliquis), CHF (on Entresto), and HTN and has been compliant with medications. Pt reports recent increased stress with the loss of his mother and recent return travel from [place] after  services. Pt denies fever, chills, chest pain, palpitations, difficulty breathing or any other physical complaints.

## 2025-07-13 NOTE — H&P ADULT - NSHPPHYSICALEXAM_GEN_ALL_CORE
Physical exam:  General: patient in no acute distress, resting comfortably  Head:  Atraumatic, Normocephalic  Eyes: EOMI, PERRLA, clear sclera  Neck: Supple, thyroid nontender, non enlarged  Cardio: S1/S2 +ve, irregular rhythm  Resp: clear to ausculation bilaterally, no rales or wheezes  GI: abdomen soft, nontender, non distended, no guarding, BS +ve x 4  Ext: no significant pedal edema  Neuro: CN 2-12 intact, no significant motor or sensory deficits.  Skin: No rashes or lesions

## 2025-07-13 NOTE — H&P ADULT - PROBLEM SELECTOR PLAN 1
45F with a PMH of pAF presents to the ED for sustained tachycardia.  Went into SVT in ED to 149.  Resolved with vagal manuevers  Seen by EP - EKG with 2:1 A Flutter  Was taking carvedilol at home, cardio prefers to transition to metoprolol 50 mg TID.    Continue eliquis BID  TTE pending

## 2025-07-13 NOTE — H&P ADULT - PROBLEM SELECTOR PROBLEM 3
[facility]   HISTORY AND PHYSICAL    Patient Name: Bebe Steward  : 1978  MRN: [id number]  Primary Care Physician:  Kimberly Almaguer MD  Date of admission: 2023    Subjective   Subjective     Chief Complaint: Here for surgery    HPI:    Bebe Steward is a 45 y.o. female who presents for total laparoscopic hysterectomy and bilateral salpingo-oophorectomy.  The patient has a history of abnormal uterine bleeding, chronic pelvic pain and suspected endometriosis.  The patient has been managed medically on Depo-Lupron.  Her symptoms been well controlled with the Lupron.  Given her excellent response and that she cannot continue Depo-Lupron until menopause the patient has elected to proceed with surgical therapy at this point.  She has tried other medical therapies in the past with no success.  Due to her kidney disease she cannot take chronic NSAIDs.  She does understand that after removal of her ovaries that she will be surgically menopausal.    Review of Systems   All systems were reviewed and negative except for: Painful periods, heavy periods, irregular periods    Personal History     Past Medical History:   Diagnosis Date   • Abnormal Pap smear of cervix 2020   • Allergies    • Anemia    • Ankle sprain     multiple   • Anxiety    • Arthritis     knee   • Arthritis of back    • Brain concussion    • Broken bones 2014   • Cervical disc disorder     Compressed disc   • Colon polyp 2023    removed   • CTS (carpal tunnel syndrome)     Left wrist   • Endometriosis    • Essential hypertension 2020   • Fracture of ankle     Left   • Fracture of wrist     Left   • Fracture, fibula    • Fracture, tibia and fibula     Left   • H/O Polycystic ovary syndrome    • Head injury    • HPV (human papilloma virus) infection     last 2 pap clear   • Hyperlipidemia    • Hypoglycemia    • Insomnia 2020   • Knee sprain    • Knee swelling 2015   • Left  History of CVA in adulthood below-knee amputee 07/21/2020   • Low back pain 2006   • Lumbosacral disc disease 2006   • Migraine    • Muscle spasm 01/04/2021   • Neck strain    • Numbness in right foot    • Obesity    • 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   • Renal insufficiency 2020    right kidney atrophy   • Right foot pain 12/02/2020   • Right hip pain 07/21/2020   • Sinus trouble    • Stage 3 chronic kidney disease 09/01/2020    currently stage 2 3-24-23   • Stress fracture    • Subluxation of patella 1996    Left   • Thoracic disc disorder    • Urogenital trichomoniasis     resolved   • Visual impairment 1988    Near sighted   • Vitamin D deficiency 12/02/2020   • Wrist sprain        Past Surgical History:   Procedure Laterality Date   • ABDOMINAL SURGERY  2010    scar tissue removed   • ANKLE OPEN REDUCTION INTERNAL FIXATION  [id number]    Left 11 surgeries total, external fixation   • ANKLE SURGERY      3/08, 5/08, 8/08, 12/10, 03/11, 2012, 2013, 2014   • BELOW KNEE AMPUTATION Left 2014   • CHOLECYSTECTOMY  2001   • COLONOSCOPY N/A 03/23/2023    Procedure: COLONOSCOPY WITH COLD SNARE POLYPECTOMIES;  Surgeon: Tian Smith MD;  Location: [facility] ENDOSCOPY;  Service: General;  Laterality: N/A;  COLON POLYPS   • ENDOMETRIAL ABLATION  2007   • EPIDURAL BLOCK  2007   • FRACTURE SURGERY  2008    left ankle   • GASTRIC BYPASS  2001   • HYSTERECTOMY      Scheduled for 4/6/2023 (total)   • KNEE SURGERY      left 1996,1997 right 2015   • SPINAL FUSION  10/2008    L5/S1   • SPINE SURGERY  2008    lumbar spine   • TONSILLECTOMY  1994   • TRIGGER POINT INJECTION  2021       Family History: family history includes Alcohol abuse in her maternal grandmother; Anesthesia problems in her father; Anxiety disorder in her maternal grandmother, mother, and son; Arthritis in her father, maternal grandfather, maternal grandmother, mother, paternal grandfather, and paternal grandmother; Broken bones in her maternal grandmother,  mother, and paternal grandmother; Cancer in her maternal grandfather, paternal aunt, and paternal grandmother; Colon cancer in her paternal aunt; Colon cancer (age of onset: 87) in her paternal grandmother; Depression in her father, maternal grandmother, and mother; Developmental Disability in her paternal aunt; Diabetes in her maternal grandfather; Early death in her mother; Heart disease in her father, maternal grandfather, maternal grandmother, and paternal grandfather; Hyperlipidemia in her father, maternal grandfather, maternal grandmother, mother, paternal grandfather, and paternal grandmother; Hypertension in her father, maternal grandfather, maternal grandmother, mother, paternal grandfather, and paternal grandmother; Kidney disease in her father, maternal grandmother, mother, paternal grandfather, and paternal grandmother; Liver disease in her maternal grandmother; Melanoma in her maternal grandfather; Miscarriages / Stillbirths in her maternal grandmother and paternal grandmother; Osteoporosis in her maternal grandmother and paternal grandmother; Other in her father, mother, and paternal grandmother; Rheumatologic disease in her father and paternal grandmother; Stroke in her maternal grandfather and mother; Thyroid disease in her maternal grandmother and mother; Vision loss in her father. Otherwise pertinent FHx was reviewed and not pertinent to current issue.    Social History:  reports that she quit smoking about 2 years ago. Her smoking use included cigarettes. She started smoking about 26 years ago. She has a 15.00 pack-year smoking history. She has been exposed to tobacco smoke. She has never used smokeless tobacco. She reports current alcohol use. She reports that she does not use drugs.    Home Medications:  HYDROcodone-acetaminophen, acetaminophen, atorvastatin, docusate sodium, fluticasone, furosemide, gabapentin, hydrALAZINE, hydrOXYzine, irbesartan, loratadine, methocarbamol, rizatriptan,  sertraline, and vitamin D      Allergies:  Allergies   Allergen Reactions   • Adhesive Tape Itching and Rash   • Cefaclor Rash   • Cephalexin Rash   • Cyclobenzaprine Other (See Comments)     Muscle spasms   • Erythromycin Rash   • Meperidine Headache   • Monosodium Glutamate Other (See Comments)     Blood pressure drops, pass out    • Morphine Irritability   • Nsaids Unknown - High Severity     CKD    • Penicillins Anaphylaxis   • Sulfa Antibiotics Rash   • Sulfamethoxazole-Trimethoprim Rash   • Vancomycin Itching       Objective   Objective     Vitals:   Temp:  [96.9 °F (36.1 °C)] 96.9 °F (36.1 °C)  Heart Rate:  [66] 66  Resp:  [18] 18  BP: (141)/(67) 141/67  Physical Exam    Constitutional: Awake, alert   Eyes: PERRLA, sclerae anicteric, no conjunctival injection   HENT: NCAT, mucous membranes moist   Neck: Supple, no thyromegaly, no lymphadenopathy, trachea midline   Respiratory: Clear to auscultation bilaterally, nonlabored respirations    Cardiovascular: RRR, no murmurs, rubs, or gallops, palpable pedal pulses bilaterally   Gastrointestinal: Positive bowel sounds, soft, nontender, nondistended              Genitourinary: Normal external genitalia, vagina, and cervix.   Musculoskeletal: The left lower extremity below the knee is absent.  There is no cyanosis clubbing or edema of the right lower extremity. Psychiatric: Appropriate affect, cooperative   Neurologic: Oriented x 3, strength symmetric in all extremities, speech clear   Skin: No rashes     Result Review    Result Review:  I have personally reviewed the results from the time of this admission to 4/6/2023 07:12 EDT and agree with these findings:  [x]  Laboratory  []  Microbiology  [x]  Radiology  []  EKG/Telemetry   []  Cardiology/Vascular   [x]  Pathology  [x]  Old records  []  Other:      Assessment & Plan   Assessment / Plan     Active Hospital Problems:  Active Hospital Problems    Diagnosis    • Chronic pelvic pain in female    • Abnormal uterine  bleeding (AUB)    • Endometriosis      Plan:   The patient has a history of abnormal uterine bleeding, chronic pelvic pain and a suspected history of endometriosis.  Due to her significant symptoms as well as failure of past medical therapies and limitations with current medical therapy the patient desires to proceed with total laparoscopic hysterectomy with bilateral salpingo-oophorectomy.  Her symptoms have been well controlled on Depo-Lupron so we do expect this to continue post surgery.  We did discuss that I cannot guarantee complete resolution of her pain post surgery.  We have discussed the risks, benefits, and alternatives to the procedure including the risk of infection, bleeding and hemorrhage, the risk of injury to my structures including bowel, bladder, vasculature, pelvic nerves, the ureters, and other nearby structures.  We discussed the risk of anesthesia, perioperative complications including thromboembolism, myocardial infarction, strokes, and death.  We have discussed the risks that the procedure may need to be converted to an abdominal hysterectomy in order to be completed safely.  We have discussed the changes in the risks of the surgery if were we to convert to an open hysterectomy, as well as the changes in hospitalization and postoperative recovery times.  The patient expresses her understanding of these risks and wishes to proceed.      Electronically signed by Yoan Yoo MD, 04/05/23, 1:47 PM EDT.

## 2025-07-13 NOTE — ED ADULT NURSE REASSESSMENT NOTE - NS ED NURSE REASSESS COMMENT FT1
Pt axo x4, GCS 15, non diaphoretic denies any complaints any new complaints, denies palpitations or feelings of heart skipping a beat; denies chest pain dizziness sob or other complaints. monitoring and plan ongoing. Hr 94 at this time and vitally stable; please see ED Adult flowsheet

## 2025-07-13 NOTE — H&P ADULT - NSICDXPASTMEDICALHX_GEN_ALL_CORE_FT
PAST MEDICAL HISTORY:  Congestive heart failure     History of CVA in adulthood     HLD (hyperlipidemia)     HTN (hypertension)     Paroxysmal atrial fibrillation

## 2025-07-13 NOTE — ED ADULT NURSE REASSESSMENT NOTE - NS ED NURSE REASSESS COMMENT FT1
Report received from RISSA Ambriz. Patient is AOx4 and in no signs of acute distress. Patient admitted to tele pending transport to 843B. Respirations even and unlabored, chest rise symmetrical b/l. Comfort measures maintained. NSR on CM. Bed in lowest position. Safety maintained.

## 2025-07-13 NOTE — ED PROVIDER NOTE - CLINICAL SUMMARY MEDICAL DECISION MAKING FREE TEXT BOX
Pt with elevated HR 130s-140s in ED without any other acute complaints, EKG shows SVT; valsalva maneuvers attempted at bedside without improvement.  Will r/o ischemia vs infectious etiology vs electrolyte derangement and consider adenosine vs cardioversion, cards consult

## 2025-07-13 NOTE — ED ADULT NURSE REASSESSMENT NOTE - NS ED NURSE REASSESS COMMENT FT1
Break coverage RN. received report from Pb RN, lying in stretcher, connected to CCM, GCS 15 pt A&Ox 4, noted to be sinus tachycardiac on handoff report HR 145s, vitally stable otherwise (please see ED adult flowsheet for VS trends); STAT repeat EKG completed as ordered and reviewed by provider; Orders placed and carried out; pt denies complaints, chest pain, sob, diaphoresis, headache, dizziness, nausea/vomiting; Pt is breathing even and unlabored, educated on medication side effects, fall precautions maintained, safety maintained, wife at bedside; educated pt and wife on fall risk and verbalizes understanding via teach back; call bell within reach and functioning; continuous cardiac/bp/o2 sat monitoring progresses. Tolerating treatments well.

## 2025-07-13 NOTE — H&P ADULT - NSHPLABSRESULTS_GEN_ALL_CORE
Recent Vitals  T(C): 36.6 (07-13-25 @ 13:46), Max: 36.6 (07-13-25 @ 10:31)  HR: 94 (07-13-25 @ 15:46) (94 - 145)  BP: 110/90 (07-13-25 @ 15:46) (110/90 - 136/89)  RR: 16 (07-13-25 @ 15:46) (16 - 18)  SpO2: 100% (07-13-25 @ 15:46) (98% - 100%)                        15.7   7.16  )-----------( 154      ( 13 Jul 2025 11:20 )             49.6     07-13    141  |  106  |  12  ----------------------------<  110[H]  3.9   |  22  |  1.30    Ca    8.8      13 Jul 2025 11:20  Mg     1.90     07-13    TPro  7.0  /  Alb  3.8  /  TBili  0.8  /  DBili  x   /  AST  12  /  ALT  7   /  AlkPhos  58  07-13    PT/INR - ( 13 Jul 2025 11:20 )   PT: 12.2 sec;   INR: 1.03 ratio         PTT - ( 13 Jul 2025 11:20 )  PTT:28.6 sec  LIVER FUNCTIONS - ( 13 Jul 2025 11:20 )  Alb: 3.8 g/dL / Pro: 7.0 g/dL / ALK PHOS: 58 U/L / ALT: 7 U/L / AST: 12 U/L / GGT: x           Urinalysis Basic - ( 13 Jul 2025 11:20 )    Color: x / Appearance: x / SG: x / pH: x  Gluc: 110 mg/dL / Ketone: x  / Bili: x / Urobili: x   Blood: x / Protein: x / Nitrite: x   Leuk Esterase: x / RBC: x / WBC x   Sq Epi: x / Non Sq Epi: x / Bacteria: x          apixaban 5 milliGRAM(s) Oral two times a day  aspirin enteric coated 81 milliGRAM(s) Oral daily  furosemide    Tablet 40 milliGRAM(s) Oral daily  metoprolol tartrate 50 milliGRAM(s) Oral three times a day  sacubitril 97 mG/valsartan 103 mG 1 Tablet(s) Oral two times a day    Home Medications:  carvedilol 25 mg oral tablet: 1 tab(s) orally every 12 hours (13 Jul 2025 17:34)  Eliquis 5 mg oral tablet: 1 tab(s) orally 2 times a day (13 Jul 2025 17:33)  furosemide 40 mg oral tablet: 1 tab(s) orally once a day (13 Jul 2025 17:40)  sacubitril-valsartan 97 mg-103 mg oral tablet: 1 tab(s) orally 2 times a day (13 Jul 2025 17:34)

## 2025-07-13 NOTE — CONSULT NOTE ADULT - ASSESSMENT
44 y/o M PMH HTN, HLD, CAD, CHF, likely NICM, CVA, pAfib on Eliquis (last taken last night 1800) c/o elevated HR noted on apple watch since yesterday. Noted to have narrow complex regular tachycardia to 140 on ECG/tele on presentation which spontaneously converted to sinus tachycardia. EP consulted for the above.    Recommendations:  -Admit to tele  -C/W home eliquis, asa  -F/U repeat TTE  -F/U CMP with mag/phos, replete lytes to K 4-4.5, mag >2, phos >3  -  44 y/o M PMH HTN, HLD, CAD, CHF, likely NICM, CVA, pAfib on Eliquis (last taken last night 1800) c/o elevated HR noted on apple watch since yesterday. Noted to have narrow complex regular tachycardia to 140 on ECG/tele on presentation likely 2:1 aflutter at times with variable conduction. Pt otherwise hemodynamically stable and asymptomatic.     Recommendations:  -Admit to tele  -C/W home eliquis, asa,   -C/W home entresto  -F/U repeat TTE  -trend CMP with mag/phos, replete lytes to K 4-4.5, mag >2, phos >3  -Would transition BB to metoprolol tartrate 50mg TID  -If rates not improving with metoprolol and TTE without reduced EF can add diltiazem  -NPO after midnight tonight for possible KIT/DCCV vs ablation in AM    Abdias Cunningham, PGY-6  Cardiology Fellow    Note not finalized until signed by attending***  44 y/o M PMH HTN, HLD, CAD, CHF, likely NICM, CVA, pAfib on Eliquis (last taken last night 1800) c/o elevated HR noted on apple watch since yesterday. Noted to have narrow complex regular tachycardia to 140 on ECG/tele on presentation likely 2:1 aflutter at times with variable conduction. Pt otherwise hemodynamically stable and asymptomatic.     Recommendations:  -Admit to tele  -C/W home eliquis, asa,   -C/W home entresto  -F/U repeat TTE  -trend CMP with mag/phos, replete lytes to K 4-4.5, mag >2, phos >3  -Would transition BB to metoprolol tartrate 50mg TID  -If rates not improving with metoprolol and TTE without reduced EF can add diltiazem  -NPO after midnight tonight for possible KIT/DCCV in AM    Abdias Cunningham, PGY-6  Cardiology Fellow    Note not finalized until signed by attending***

## 2025-07-13 NOTE — ED PROVIDER NOTE - OBJECTIVE STATEMENT
44 y/o M PMH HTN, HLD, CAD, CHF, CVA, pAfib on Eliquis (last taken last night 1800) c/o elevated HR noted on apple watch since yesterday. Pt states he otherwise feels well but HR has sustained >120-140 for the past day. Denies fever, chills, recent illness, CP, SOB, LEACH, abdominal pain, N/V/D, le edema

## 2025-07-13 NOTE — H&P ADULT - ASSESSMENT
Patient is a 45F with a PMH of CVA in 2023, pAFib on eliquis, CAD, HTN, HLD who presents to the ED for tachycardia.  Found to have SVT.  Admitted to tele.  EP plan for ablation v [facility]

## 2025-07-13 NOTE — H&P ADULT - HISTORY OF PRESENT ILLNESS
Patient is a 45F with a PMH of CVA in 2023, pAFib on eliquis, CAD, HTN, HLD who presents to the ED for tachycardia.  Patient states that he was sitting on his bed yesterday evening when he got an alert from his watch.  Watch reported that his HR was elevated >120 for ten minutes.  Patient states that he was asymptomatic, denies history of CP, palpitations, dyspnea, dizziness, lightheadedness.  Notes his HR improved slightly before going to sleep.  When patient woke up today, he noted that his HR was still around 120.  Presented to the ED for evaluation.  While in ED, patient noted to have SVT as high as 149 - resolved with vagal manuevers at the bedside.  Patient states that he has remained asymptomatic since onset of tachycardia.  Nonsmoker, social drinker, denies recreational drugs.  Will admit to tele

## 2025-07-13 NOTE — ED ADULT TRIAGE NOTE - INTERNATIONAL TRAVEL DAYS 1
Called and informed pt Dr. Smith wants to do IUI tomorrow and to have her  collect at 1000 and to come to the office at 1045 and to do trigger today, pt voiced understanding.    0-6 days ([place] and [facility])

## 2025-07-13 NOTE — ED ADULT TRIAGE NOTE - HEIGHT IN INCHES
Chief Complaint   Patient presents with    Sinus Problem     Has been having a sinus cold for about 2 weeks. Stats that it has not gotten better has been taking over the counter medications to try and feel better has not helped.      CC:   Chief Complaint   Patient presents with    Sinus Problem     Has been having a sinus cold for about 2 weeks. Stats that it has not gotten better has been taking over the counter medications to try and feel better has not helped.         SUBJECTIVE:    Bety Garcia is a 68 year old female who presents to [facility] with respiratory symptoms which have been present for one week.   Symptoms include: sinus pressure, drainage, cough and sore throat      Denies: chest pain or shortness of breath.     The remainder of the ROS is negative.    OBJECTIVE:    Vitals:    02/24/25 1040   BP: 114/68   BP Location: RUE - Right upper extremity   Patient Position: Sitting   Cuff Size: Small Adult   Pulse: 87   Resp: 18   Temp: 98 °F (36.7 °C)   TempSrc: Temporal   SpO2: 97%      Gen: Alert, well hydrated, in no apparent distress  Ears: TM's intact without erythema, bulging, retraction, or fluid-level. External auditory canal clear  Conjunctiva: clear without injection or discharge  Nose: paranasal tenderness, nasal mucosa moist, pink, with rhinorrhea,nasal mucosa erythematous and swollen and purulent rhinorrhea  Neck: supple without cervical adenopathy. No meningismus  Throat: pink and moist without erythema, edema, or exudates and erythema moderate  Heart: regular rate and rhythm and no murmurs, clicks or gallops  Lungs: clear to auscultation without wheezes, rhonchi or rales; normal respiratory effort       Medical Decision Making      Patient was informed of possible serious medical complications of a sinus infection - Patients medical history and risk factor stratification was discussed.    Tests performed and interpreted - nne    Management involves Reassurance and observation,  hydration, symptomatic treatment - antibiotics were recommended today    ASSESSMENT/PLAN:    Sinusitis  Rhinitis medicamentosa    Stop afrin  Oral antibiotics, prednisone,decongestants and hydration    Diagnosis and prognosis, treatment and side-effects were explained and all questions were answered satisfactorily and there were no further questions upon discharge.    Electronically signed by: Marco Mireles MD  2/24/2025        3

## 2025-07-13 NOTE — ED ADULT TRIAGE NOTE - CHIEF COMPLAINT QUOTE
Pt c/o elevated heart rate x 1 day. Reports HR 130s-140s noted on apple watch.  in triage. Denies palpitations, chest pain, SOB, lightheadedness, dizziness, N/V. PHx HTN, HLD, CHF, CVA with RLE residual weakness.

## 2025-07-14 ENCOUNTER — RESULT REVIEW (OUTPATIENT)
Age: 45
End: 2025-07-14

## 2025-07-14 LAB
ALBUMIN SERPL ELPH-MCNC: 3.5 G/DL — SIGNIFICANT CHANGE UP (ref 3.3–5)
ALP SERPL-CCNC: 53 U/L — SIGNIFICANT CHANGE UP (ref 40–120)
ALT FLD-CCNC: 9 U/L — SIGNIFICANT CHANGE UP (ref 4–41)
ANION GAP SERPL CALC-SCNC: 11 MMOL/L — SIGNIFICANT CHANGE UP (ref 7–14)
AST SERPL-CCNC: 12 U/L — SIGNIFICANT CHANGE UP (ref 4–40)
BILIRUB SERPL-MCNC: 0.6 MG/DL — SIGNIFICANT CHANGE UP (ref 0.2–1.2)
BUN SERPL-MCNC: 13 MG/DL — SIGNIFICANT CHANGE UP (ref 7–23)
CALCIUM SERPL-MCNC: 8.7 MG/DL — SIGNIFICANT CHANGE UP (ref 8.4–10.5)
CHLORIDE SERPL-SCNC: 106 MMOL/L — SIGNIFICANT CHANGE UP (ref 98–107)
CO2 SERPL-SCNC: 23 MMOL/L — SIGNIFICANT CHANGE UP (ref 22–31)
CREAT SERPL-MCNC: 1.12 MG/DL — SIGNIFICANT CHANGE UP (ref 0.5–1.3)
EGFR: 83 ML/MIN/1.73M2 — SIGNIFICANT CHANGE UP
EGFR: 83 ML/MIN/1.73M2 — SIGNIFICANT CHANGE UP
GLUCOSE SERPL-MCNC: 104 MG/DL — HIGH (ref 70–99)
HCT VFR BLD CALC: 50.6 % — HIGH (ref 39–50)
HGB BLD-MCNC: 15.8 G/DL — SIGNIFICANT CHANGE UP (ref 13–17)
MCHC RBC-ENTMCNC: 24.3 PG — LOW (ref 27–34)
MCHC RBC-ENTMCNC: 31.2 G/DL — LOW (ref 32–36)
MCV RBC AUTO: 78 FL — LOW (ref 80–100)
NRBC # BLD AUTO: 0 K/UL — SIGNIFICANT CHANGE UP (ref 0–0)
NRBC # FLD: 0 K/UL — SIGNIFICANT CHANGE UP (ref 0–0)
NRBC BLD AUTO-RTO: 0 /100 WBCS — SIGNIFICANT CHANGE UP (ref 0–0)
PLATELET # BLD AUTO: 142 K/UL — LOW (ref 150–400)
PMV BLD: 12.4 FL — SIGNIFICANT CHANGE UP (ref 7–13)
POTASSIUM SERPL-MCNC: 4.2 MMOL/L — SIGNIFICANT CHANGE UP (ref 3.5–5.3)
POTASSIUM SERPL-SCNC: 4.2 MMOL/L — SIGNIFICANT CHANGE UP (ref 3.5–5.3)
PROT SERPL-MCNC: 6.7 G/DL — SIGNIFICANT CHANGE UP (ref 6–8.3)
RBC # BLD: 6.49 M/UL — HIGH (ref 4.2–5.8)
RBC # FLD: 16.1 % — HIGH (ref 10.3–14.5)
SODIUM SERPL-SCNC: 140 MMOL/L — SIGNIFICANT CHANGE UP (ref 135–145)
WBC # BLD: 7.03 K/UL — SIGNIFICANT CHANGE UP (ref 3.8–10.5)
WBC # FLD AUTO: 7.03 K/UL — SIGNIFICANT CHANGE UP (ref 3.8–10.5)

## 2025-07-14 PROCEDURE — 93306 TTE W/DOPPLER COMPLETE: CPT | Mod: 26

## 2025-07-14 RX ADMIN — Medication 81 MILLIGRAM(S): at 12:49

## 2025-07-14 RX ADMIN — APIXABAN 5 MILLIGRAM(S): 5 TABLET, FILM COATED ORAL at 06:32

## 2025-07-14 RX ADMIN — Medication 1 APPLICATION(S): at 12:50

## 2025-07-14 RX ADMIN — METOPROLOL SUCCINATE 50 MILLIGRAM(S): 50 TABLET, EXTENDED RELEASE ORAL at 06:32

## 2025-07-14 RX ADMIN — SACUBITRIL AND VALSARTAN 1 TABLET(S): 6; 6 PELLET ORAL at 18:01

## 2025-07-14 RX ADMIN — Medication 10 MILLIGRAM(S): at 09:02

## 2025-07-14 RX ADMIN — SACUBITRIL AND VALSARTAN 1 TABLET(S): 6; 6 PELLET ORAL at 06:32

## 2025-07-14 RX ADMIN — METOPROLOL SUCCINATE 50 MILLIGRAM(S): 50 TABLET, EXTENDED RELEASE ORAL at 14:27

## 2025-07-14 RX ADMIN — FUROSEMIDE 40 MILLIGRAM(S): 10 INJECTION INTRAMUSCULAR; INTRAVENOUS at 06:32

## 2025-07-14 RX ADMIN — APIXABAN 5 MILLIGRAM(S): 5 TABLET, FILM COATED ORAL at 18:01

## 2025-07-14 NOTE — CONSULT NOTE ADULT - SUBJECTIVE AND OBJECTIVE BOX
HISTORY OF PRESENT ILLNESS: HPI:  Patient is a 45F with a PMH of HTN, NICM s/p [facility] 2020 with non obs CAD, CVA in 2023, residual R sided weakness and speech deficit, PAF on Eliquis and HLD admitted with tachycardia, SVT vs Atrial Flutter.  Denies palps, chest pain or SOB or dizziness.    While in ED, patient noted to have SVT as high as 149 - resolved with vagal manuevers at the bedside.  Patient states that he has remained asymptomatic since onset of tachycardia.  Nonsmoker, social drinker, denies recreational drugs.  Will admit to tele (13 Jul 2025 17:41)      PAST MEDICAL & SURGICAL HISTORY:  Congestive heart failure      HTN (hypertension)      HLD (hyperlipidemia)      Paroxysmal atrial fibrillation      History of CVA in adulthood      No significant past surgical history      MEDICATIONS  (STANDING):  apixaban 5 milliGRAM(s) Oral two times a day  aspirin enteric coated 81 milliGRAM(s) Oral daily  chlorhexidine 2% Cloths 1 Application(s) Topical daily  furosemide    Tablet 40 milliGRAM(s) Oral daily  metoprolol tartrate 50 milliGRAM(s) Oral three times a day  sacubitril 97 mG/valsartan 103 mG 1 Tablet(s) Oral two times a day      Allergies  No Known Allergies        FAMILY HISTORY:  FH: HTN (hypertension)  Noncontributory for premature coronary disease or sudden cardiac death    SOCIAL HISTORY:    [x ] Non-smoker  [ ] Smoker  [ ] Alcohol    FLU VACCINE THIS YEAR STARTS IN AUGUST:  [ ] Yes    [ ] No    IF OVER 65 HAVE YOU EVER HAD A PNA VACCINE:  [ ] Yes    [ ] No       [ ] N/A      REVIEW OF SYSTEMS:  [ ]chest pain  [  ]shortness of breath  [  ]palpitations  [  ]syncope  [ ]near syncope [ ]upper extremity weakness   [ ] lower extremity weakness  [  ]diplopia  [  ]altered mental status   [  ]fevers  [ ]chills [ ]nausea  [ ]vomiting  [  ]dysphagia    [ ]abdominal pain  [ ]melena  [ ]BRBPR    [  ]epistaxis  [  ]rash    [ ]lower extremity edema        [ ] All others negative	  [ ] Unable to obtain      LABS:	 	    CARDIAC MARKERS:  Troponin T, High Sensitivity Result: 47, 43                          15.8   7.03  )-----------( 142      ( 14 Jul 2025 08:38 )             50.6     140  |  106  |  13  ----------------------------<  104[H]  4.2   |  23  |  1.12    Ca    8.7      14 Jul 2025 08:38  Mg     1.90     07-13    TPro  6.7  /  Alb  3.5  /  TBili  0.6  /  DBili  x   /  AST  12  /  ALT  9   /  AlkPhos  53  07-14    Creatinine Trend: 1.12<--, 1.30<--    PHYSICAL EXAM:  T(C): 36.4 (07-14-25 @ 11:25), Max: 36.7 (07-13-25 @ 19:17)  HR: 140 (07-14-25 @ 11:25) (62 - 145)  BP: 113/82 (07-14-25 @ 11:25) (110/90 - 157/102)  RR: 18 (07-14-25 @ 11:25) (16 - 19)  SpO2: 100% (07-14-25 @ 11:25) (98% - 100%)  Wt(kg): --   BMI (kg/m2): 35.9 (07-13-25 @ 10:31)  I&O's Summary      Gen: Appears well in NAD  HEENT:  (-)icterus (-)pallor  CV: N S1 S2 1/6 SALLY (+)2 Pulses B/l  Resp:  Clear to ausculatation B/L, normal effort  GI: (+) BS Soft, NT, ND  Lymph:  (-)Edema, (-)obvious lymphadenopathy  Skin: Warm to touch, Normal turgor  Psych: Appropriate mood and affect      TELEMETRY: 	  Aflutter/ AFib up to 140s    ECG:  	SVT vs Aflutter    TTE 5/15/25 in office:  LVEF 50% moderate concentric remodelingof LV with grade I diastolic dysfxn  severe LAE    NST 3/1/2023  No ischemia or infarct, EF 40%    ASSESSMENT/PLAN: Patient is a 45F with a PMH of HTN, NICM s/p [facility] 2020 with non obs CAD, CVA in 2023, residual R sided weakness and speech deficit, PAF on Eliquis and HLD admitted with tachycardia, SVT vs Atrial Flutter.  --Ep consulted, Dr Mahoney  --pt with known PAFib, suspect this is Aflutter  --Already on lifelong AC with ELiquis given hx AFib and CVA  --Not in clinical CHF, recent office echo as noted above  --cont Metoprolol and Entresto for known NICM    Magdalene WHITT  [phone]

## 2025-07-14 NOTE — PATIENT PROFILE ADULT - NSPROMEDSBROUGHTTOHOSP_GEN_A_NUR
You can access the FollowMyHealth Patient Portal offered by [facility] by registering at the following website: [email or web address]. By joining [facility]’s FollowMyHealth portal, you will also be able to view your health information using other applications (apps) compatible with our system. You can access the FollowMyHealth Patient Portal offered by [facility] by registering at the following website: [email or web address]. By joining [email or web address]’s FollowMyHealth portal, you will also be able to view your health information using other applications (apps) compatible with our system. no

## 2025-07-14 NOTE — CONSULT NOTE ADULT - NS ATTEND AMEND GEN_ALL_CORE FT
Patient seen and examined. Agree with plan as detailed in PA/NP Note.     APtient asymptomatic, c/w BB, c/w AC, f/u EP recs for abalation    Helio Atkinson MD  Pager: [phone]  Office: [phone]

## 2025-07-14 NOTE — PATIENT PROFILE ADULT - NSPRESCRUSEDDRG_GEN_A_NUR
Elidel Pregnancy And Lactation Text: This medication is Pregnancy Category C. It is unknown if this medication is excreted in breast milk. Prednisone Pregnancy And Lactation Text: This medication is Pregnancy Category C and it isn't know if it is safe during pregnancy. This medication is excreted in breast milk. Enbrel Pregnancy And Lactation Text: This medication is Pregnancy Category B and is considered safe during pregnancy. It is unknown if this medication is excreted in breast milk. Colchicine Counseling:  Patient counseled regarding adverse effects including but not limited to stomach upset (nausea, vomiting, stomach pain, or diarrhea).  Patient instructed to limit alcohol consumption while taking this medication.  Colchicine may reduce blood counts especially with prolonged use.  The patient understands that monitoring of kidney function and blood counts may be required, especially at baseline. The patient verbalized understanding of the proper use and possible adverse effects of colchicine.  All of the patient's questions and concerns were addressed. Eucrisa Counseling: Patient may experience a mild burning sensation during topical application. Eucrisa is not approved in children less than 2 years of age. Infliximab Counseling:  I discussed with the patient the risks of infliximab including but not limited to myelosuppression, immunosuppression, autoimmune hepatitis, demyelinating diseases, lymphoma, and serious infections.  The patient understands that monitoring is required including a PPD at baseline and must alert us or the primary physician if symptoms of infection or other concerning signs are noted. 5-Fu Pregnancy And Lactation Text: This medication is Pregnancy Category X and contraindicated in pregnancy and in women who may become pregnant. It is unknown if this medication is excreted in breast milk. Tremfya Counseling: I discussed with the patient the risks of guselkumab including but not limited to immunosuppression, serious infections, worsening of inflammatory bowel disease and drug reactions.  The patient understands that monitoring is required including a PPD at baseline and must alert us or the primary physician if symptoms of infection or other concerning signs are noted. Tazorac Counseling:  Patient advised that medication is irritating and drying.  Patient may need to apply sparingly and wash off after an hour before eventually leaving it on overnight.  The patient verbalized understanding of the proper use and possible adverse effects of tazorac.  All of the patient's questions and concerns were addressed. Xeljanz Counseling: I discussed with the patient the risks of Xeljanz therapy including increased risk of infection, liver issues, headache, diarrhea, or cold symptoms. Live vaccines should be avoided. They were instructed to call if they have any problems. Azithromycin Counseling:  I discussed with the patient the risks of azithromycin including but not limited to GI upset, allergic reaction, drug rash, diarrhea, and yeast infections. Topical Clindamycin Counseling: Patient counseled that this medication may cause skin irritation or allergic reactions.  In the event of skin irritation, the patient was advised to reduce the amount of the drug applied or use it less frequently.   The patient verbalized understanding of the proper use and possible adverse effects of clindamycin.  All of the patient's questions and concerns were addressed. Xolair Pregnancy And Lactation Text: This medication is Pregnancy Category B and is considered safe during pregnancy. This medication is excreted in breast milk. Gabapentin Counseling: I discussed with the patient the risks of gabapentin including but not limited to dizziness, somnolence, fatigue and ataxia. Hydroxychloroquine Counseling:  I discussed with the patient that a baseline ophthalmologic exam is needed at the start of therapy and every year thereafter while on therapy. A CBC may also be warranted for monitoring.  The side effects of this medication were discussed with the patient, including but not limited to agranulocytosis, aplastic anemia, seizures, rashes, retinopathy, and liver toxicity. Patient instructed to call the office should any adverse effect occur.  The patient verbalized understanding of the proper use and possible adverse effects of Plaquenil.  All the patient's questions and concerns were addressed. Cellcept Counseling:  I discussed with the patient the risks of mycophenolate mofetil including but not limited to infection/immunosuppression, GI upset, hypokalemia, hypercholesterolemia, bone marrow suppression, lymphoproliferative disorders, malignancy, GI ulceration/bleed/perforation, colitis, interstitial lung disease, kidney failure, progressive multifocal leukoencephalopathy, and birth defects.  The patient understands that monitoring is required including a baseline creatinine and regular CBC testing. In addition, patient must alert us immediately if symptoms of infection or other concerning signs are noted. Terbinafine Counseling: Patient counseling regarding adverse effects of terbinafine including but not limited to headache, diarrhea, rash, upset stomach, liver function test abnormalities, itching, taste/smell disturbance, nausea, abdominal pain, and flatulence.  There is a rare possibility of liver failure that can occur when taking terbinafine.  The patient understands that a baseline LFT and kidney function test may be required. The patient verbalized understanding of the proper use and possible adverse effects of terbinafine.  All of the patient's questions and concerns were addressed. Itraconazole Pregnancy And Lactation Text: This medication is Pregnancy Category C and it isn't know if it is safe during pregnancy. It is also excreted in breast milk. Xellyubovz Pregnancy And Lactation Text: This medication is Pregnancy Category D and is not considered safe during pregnancy.  The risk during breast feeding is also uncertain. Prednisone Counseling:  I discussed with the patient the risks of prolonged use of prednisone including but not limited to weight gain, insomnia, osteoporosis, mood changes, diabetes, susceptibility to infection, glaucoma and high blood pressure.  In cases where prednisone use is prolonged, patients should be monitored with blood pressure checks, serum glucose levels and an eye exam.  Additionally, the patient may need to be placed on GI prophylaxis, PCP prophylaxis, and calcium and vitamin D supplementation and/or a bisphosphonate.  The patient verbalized understanding of the proper use and the possible adverse effects of prednisone.  All of the patient's questions and concerns were addressed. Arava Pregnancy And Lactation Text: This medication is Pregnancy Category X and is absolutely contraindicated during pregnancy. It is unknown if it is excreted in breast milk. Methotrexate Pregnancy And Lactation Text: This medication is Pregnancy Category X and is known to cause fetal harm. This medication is excreted in breast milk. Spironolactone Counseling: Patient advised regarding risks of diarrhea, abdominal pain, hyperkalemia, birth defects (for female patients), liver toxicity and renal toxicity. The patient may need blood work to monitor liver and kidney function and potassium levels while on therapy. The patient verbalized understanding of the proper use and possible adverse effects of spironolactone.  All of the patient's questions and concerns were addressed. Bactrim Counseling:  I discussed with the patient the risks of sulfa antibiotics including but not limited to GI upset, allergic reaction, drug rash, diarrhea, dizziness, photosensitivity, and yeast infections.  Rarely, more serious reactions can occur including but not limited to aplastic anemia, agranulocytosis, methemoglobinemia, blood dyscrasias, liver or kidney failure, lung infiltrates or desquamative/blistering drug rashes. Benzoyl Peroxide Counseling: Patient counseled that medicine may cause skin irritation and bleach clothing.  In the event of skin irritation, the patient was advised to reduce the amount of the drug applied or use it less frequently.   The patient verbalized understanding of the proper use and possible adverse effects of benzoyl peroxide.  All of the patient's questions and concerns were addressed. Bexarotene Pregnancy And Lactation Text: This medication is Pregnancy Category X and should not be given to women who are pregnant or may become pregnant. This medication should not be used if you are breast feeding. Tetracycline Counseling: Patient counseled regarding possible photosensitivity and increased risk for sunburn.  Patient instructed to avoid sunlight, if possible.  When exposed to sunlight, patients should wear protective clothing, sunglasses, and sunscreen.  The patient was instructed to call the office immediately if the following severe adverse effects occur:  hearing changes, easy bruising/bleeding, severe headache, or vision changes.  The patient verbalized understanding of the proper use and possible adverse effects of tetracycline.  All of the patient's questions and concerns were addressed. Patient understands to avoid pregnancy while on therapy due to potential birth defects. High Dose Vitamin A Counseling: Side effects reviewed, pt to contact office should one occur. Protopic Pregnancy And Lactation Text: This medication is Pregnancy Category C. It is unknown if this medication is excreted in breast milk when applied topically. Azithromycin Pregnancy And Lactation Text: This medication is considered safe during pregnancy and is also secreted in breast milk. Cyclosporine Counseling:  I discussed with the patient the risks of cyclosporine including but not limited to hypertension, gingival hyperplasia,myelosuppression, immunosuppression, liver damage, kidney damage, neurotoxicity, lymphoma, and serious infections. The patient understands that monitoring is required including baseline blood pressure, CBC, CMP, lipid panel and uric acid, and then 1-2 times monthly CMP and blood pressure. Rifampin Counseling: I discussed with the patient the risks of rifampin including but not limited to liver damage, kidney damage, red-orange body fluids, nausea/vomiting and severe allergy. Doxepin Counseling:  Patient advised that the medication is sedating and not to drive a car after taking this medication. Patient informed of potential adverse effects including but not limited to dry mouth, urinary retention, and blurry vision.  The patient verbalized understanding of the proper use and possible adverse effects of doxepin.  All of the patient's questions and concerns were addressed. 5-Fu Counseling: 5-Fluorouracil Counseling:  I discussed with the patient the risks of 5-fluorouracil including but not limited to erythema, scaling, itching, weeping, crusting, and pain. Taltz Pregnancy And Lactation Text: The risk during pregnancy and breastfeeding is uncertain with this medication. Solaraze Counseling:  I discussed with the patient the risks of Solaraze including but not limited to erythema, scaling, itching, weeping, crusting, and pain. Albendazole Counseling:  I discussed with the patient the risks of albendazole including but not limited to cytopenia, kidney damage, nausea/vomiting and severe allergy.  The patient understands that this medication is being used in an off-label manner. Hydroxychloroquine Pregnancy And Lactation Text: This medication has been shown to cause fetal harm but it isn't assigned a Pregnancy Risk Category. There are small amounts excreted in breast milk. Topical Sulfur Applications Pregnancy And Lactation Text: This medication is Pregnancy Category C and has an unknown safety profile during pregnancy. It is unknown if this topical medication is excreted in breast milk. Rifampin Pregnancy And Lactation Text: This medication is Pregnancy Category C and it isn't know if it is safe during pregnancy. It is also excreted in breast milk and should not be used if you are breast feeding. Ketoconazole Pregnancy And Lactation Text: This medication is Pregnancy Category C and it isn't know if it is safe during pregnancy. It is also excreted in breast milk and breast feeding isn't recommended. Spironolactone Pregnancy And Lactation Text: This medication can cause feminization of the male fetus and should be avoided during pregnancy. The active metabolite is also found in breast milk. Azathioprine Pregnancy And Lactation Text: This medication is Pregnancy Category D and isn't considered safe during pregnancy. It is unknown if this medication is excreted in breast milk. Stelara Counseling:  I discussed with the patient the risks of ustekinumab including but not limited to immunosuppression, malignancy, posterior leukoencephalopathy syndrome, and serious infections.  The patient understands that monitoring is required including a PPD at baseline and must alert us or the primary physician if symptoms of infection or other concerning signs are noted. High Dose Vitamin A Pregnancy And Lactation Text: High dose vitamin A therapy is contraindicated during pregnancy and breast feeding. Colchicine Pregnancy And Lactation Text: This medication is Pregnancy Category C and isn't considered safe during pregnancy. It is excreted in breast milk. No Tazorac Pregnancy And Lactation Text: This medication is not safe during pregnancy. It is unknown if this medication is excreted in breast milk. Erivedge Counseling- I discussed with the patient the risks of Erivedge including but not limited to nausea, vomiting, diarrhea, constipation, weight loss, changes in the sense of taste, decreased appetite, muscle spasms, and hair loss.  The patient verbalized understanding of the proper use and possible adverse effects of Erivedge.  All of the patient's questions and concerns were addressed. Include Pregnancy/Lactation Warning?: No Ivermectin Pregnancy And Lactation Text: This medication is Pregnancy Category C and it isn't known if it is safe during pregnancy. It is also excreted in breast milk. Griseofulvin Counseling:  I discussed with the patient the risks of griseofulvin including but not limited to photosensitivity, cytopenia, liver damage, nausea/vomiting and severe allergy.  The patient understands that this medication is best absorbed when taken with a fatty meal (e.g., ice cream or french fries). Metronidazole Counseling:  I discussed with the patient the risks of metronidazole including but not limited to seizures, nausea/vomiting, a metallic taste in the mouth, nausea/vomiting and severe allergy. SSKI Counseling:  I discussed with the patient the risks of SSKI including but not limited to thyroid abnormalities, metallic taste, GI upset, fever, headache, acne, arthralgias, paraesthesias, lymphadenopathy, easy bleeding, arrhythmias, and allergic reaction. Minocycline Counseling: Patient advised regarding possible photosensitivity and discoloration of the teeth, skin, lips, tongue and gums.  Patient instructed to avoid sunlight, if possible.  When exposed to sunlight, patients should wear protective clothing, sunglasses, and sunscreen.  The patient was instructed to call the office immediately if the following severe adverse effects occur:  hearing changes, easy bruising/bleeding, severe headache, or vision changes.  The patient verbalized understanding of the proper use and possible adverse effects of minocycline.  All of the patient's questions and concerns were addressed. Terbinafine Pregnancy And Lactation Text: This medication is Pregnancy Category B and is considered safe during pregnancy. It is also excreted in breast milk and breast feeding isn't recommended. Sski Pregnancy And Lactation Text: This medication is Pregnancy Category D and isn't considered safe during pregnancy. It is excreted in breast milk. Topical Sulfur Applications Counseling: Topical Sulfur Counseling: Patient counseled that this medication may cause skin irritation or allergic reactions.  In the event of skin irritation, the patient was advised to reduce the amount of the drug applied or use it less frequently.   The patient verbalized understanding of the proper use and possible adverse effects of topical sulfur application.  All of the patient's questions and concerns were addressed. Simponi Counseling:  I discussed with the patient the risks of golimumab including but not limited to myelosuppression, immunosuppression, autoimmune hepatitis, demyelinating diseases, lymphoma, and serious infections.  The patient understands that monitoring is required including a PPD at baseline and must alert us or the primary physician if symptoms of infection or other concerning signs are noted. Arava Counseling:  Patient counseled regarding adverse effects of Arava including but not limited to nausea, vomiting, abnormalities in liver function tests. Patients may develop mouth sores, rash, diarrhea, and abnormalities in blood counts. The patient understands that monitoring is required including LFTs and blood counts.  There is a rare possibility of scarring of the liver and lung problems that can occur when taking methotrexate. Persistent nausea, loss of appetite, pale stools, dark urine, cough, and shortness of breath should be reported immediately. Patient advised to discontinue Arava treatment and consult with a physician prior to attempting conception. The patient will have to undergo a treatment to eliminate Arava from the body prior to conception. Valtrex Counseling: I discussed with the patient the risks of valacyclovir including but not limited to kidney damage, nausea, vomiting and severe allergy.  The patient understands that if the infection seems to be worsening or is not improving, they are to call. Picato Counseling:  I discussed with the patient the risks of Picato including but not limited to erythema, scaling, itching, weeping, crusting, and pain. Xolair Counseling:  Patient informed of potential adverse effects including but not limited to fever, muscle aches, rash and allergic reactions.  The patient verbalized understanding of the proper use and possible adverse effects of Xolair.  All of the patient's questions and concerns were addressed. Birth Control Pills Counseling: Birth Control Pill Counseling: I discussed with the patient the potential side effects of OCPs including but not limited to increased risk of stroke, heart attack, thrombophlebitis, deep venous thrombosis, hepatic adenomas, breast changes, GI upset, headaches, and depression.  The patient verbalized understanding of the proper use and possible adverse effects of OCPs. All of the patient's questions and concerns were addressed. Dapsone Counseling: I discussed with the patient the risks of dapsone including but not limited to hemolytic anemia, agranulocytosis, rashes, methemoglobinemia, kidney failure, peripheral neuropathy, headaches, GI upset, and liver toxicity.  Patients who start dapsone require monitoring including baseline LFTs and weekly CBCs for the first month, then every month thereafter.  The patient verbalized understanding of the proper use and possible adverse effects of dapsone.  All of the patient's questions and concerns were addressed. Isotretinoin Counseling: Patient should get monthly blood tests, not donate blood, not drive at night if vision affected, not share medication, and not undergo elective surgery for 6 months after tx completed. Side effects reviewed, pt to contact office should one occur. Drysol Pregnancy And Lactation Text: This medication is considered safe during pregnancy and breast feeding. Taltz Counseling: I discussed with the patient the risks of ixekizumab including but not limited to immunosuppression, serious infections, worsening of inflammatory bowel disease and drug reactions.  The patient understands that monitoring is required including a PPD at baseline and must alert us or the primary physician if symptoms of infection or other concerning signs are noted. Minoxidil Pregnancy And Lactation Text: This medication has not been assigned a Pregnancy Risk Category but animal studies failed to show danger with the topical medication. It is unknown if the medication is excreted in breast milk. Clofazimine Counseling:  I discussed with the patient the risks of clofazimine including but not limited to skin and eye pigmentation, liver damage, nausea/vomiting, gastrointestinal bleeding and allergy. Carac Counseling:  I discussed with the patient the risks of Carac including but not limited to erythema, scaling, itching, weeping, crusting, and pain. Hydroxyzine Pregnancy And Lactation Text: This medication is not safe during pregnancy and should not be taken. It is also excreted in breast milk and breast feeding isn't recommended. Cosentyx Counseling:  I discussed with the patient the risks of Cosentyx including but not limited to worsening of Crohn's disease, immunosuppression, allergic reactions and infections.  The patient understands that monitoring is required including a PPD at baseline and must alert us or the primary physician if symptoms of infection or other concerning signs are noted. Drysol Counseling:  I discussed with the patient the risks of drysol/aluminum chloride including but not limited to skin rash, itching, irritation, burning. Solaraze Pregnancy And Lactation Text: This medication is Pregnancy Category B and is considered safe. There is some data to suggest avoiding during the third trimester. It is unknown if this medication is excreted in breast milk. Rituxan Pregnancy And Lactation Text: This medication is Pregnancy Category C and it isn't know if it is safe during pregnancy. It is unknown if this medication is excreted in breast milk but similar antibodies are known to be excreted. Quinolones Counseling:  I discussed with the patient the risks of fluoroquinolones including but not limited to GI upset, allergic reaction, drug rash, diarrhea, dizziness, photosensitivity, yeast infections, liver function test abnormalities, tendonitis/tendon rupture. Benzoyl Peroxide Pregnancy And Lactation Text: This medication is Pregnancy Category C. It is unknown if benzoyl peroxide is excreted in breast milk. Ivermectin Counseling:  Patient instructed to take medication on an empty stomach with a full glass of water.  Patient informed of potential adverse effects including but not limited to nausea, diarrhea, dizziness, itching, and swelling of the extremities or lymph nodes.  The patient verbalized understanding of the proper use and possible adverse effects of ivermectin.  All of the patient's questions and concerns were addressed. Dapsone Pregnancy And Lactation Text: This medication is Pregnancy Category C and is not considered safe during pregnancy or breast feeding. Birth Control Pills Pregnancy And Lactation Text: This medication should be avoided if pregnant and for the first 30 days post-partum. Elidel Counseling: Patient may experience a mild burning sensation during topical application. Elidel is not approved in children less than 2 years of age. There have been case reports of hematologic and skin malignancies in patients using topical calcineurin inhibitors although causality is questionable. Isotretinoin Pregnancy And Lactation Text: This medication is Pregnancy Category X and is considered extremely dangerous during pregnancy. It is unknown if it is excreted in breast milk. Dupixent Pregnancy And Lactation Text: This medication likely crosses the placenta but the risk for the fetus is uncertain. This medication is excreted in breast milk. Humira Counseling:  I discussed with the patient the risks of adalimumab including but not limited to myelosuppression, immunosuppression, autoimmune hepatitis, demyelinating diseases, lymphoma, and serious infections.  The patient understands that monitoring is required including a PPD at baseline and must alert us or the primary physician if symptoms of infection or other concerning signs are noted. Cimetidine Counseling:  I discussed with the patient the risks of Cimetidine including but not limited to gynecomastia, headache, diarrhea, nausea, drowsiness, arrhythmias, pancreatitis, skin rashes, psychosis, bone marrow suppression and kidney toxicity. Ketoconazole Counseling:   Patient counseled regarding improving absorption with orange juice.  Adverse effects include but are not limited to breast enlargement, headache, diarrhea, nausea, upset stomach, liver function test abnormalities, taste disturbance, and stomach pain.  There is a rare possibility of liver failure that can occur when taking ketoconazole. The patient understands that monitoring of LFTs may be required, especially at baseline. The patient verbalized understanding of the proper use and possible adverse effects of ketoconazole.  All of the patient's questions and concerns were addressed. Doxepin Pregnancy And Lactation Text: This medication is Pregnancy Category C and it isn't known if it is safe during pregnancy. It is also excreted in breast milk and breast feeding isn't recommended. Cephalexin Counseling: I counseled the patient regarding use of cephalexin as an antibiotic for prophylactic and/or therapeutic purposes. Cephalexin (commonly prescribed under brand name Keflex) is a cephalosporin antibiotic which is active against numerous classes of bacteria, including most skin bacteria. Side effects may include nausea, diarrhea, gastrointestinal upset, rash, hives, yeast infections, and in rare cases, hepatitis, kidney disease, seizures, fever, confusion, neurologic symptoms, and others. Patients with severe allergies to penicillin medications are cautioned that there is about a 10% incidence of cross-reactivity with cephalosporins. When possible, patients with penicillin allergies should use alternatives to cephalosporins for antibiotic therapy. Rituxan Counseling:  I discussed with the patient the risks of Rituxan infusions. Side effects can include infusion reactions, severe drug rashes including mucocutaneous reactions, reactivation of latent hepatitis and other infections and rarely progressive multifocal leukoencephalopathy.  All of the patient's questions and concerns were addressed. Erythromycin Counseling:  I discussed with the patient the risks of erythromycin including but not limited to GI upset, allergic reaction, drug rash, diarrhea, increase in liver enzymes, and yeast infections. Odomzo Counseling- I discussed with the patient the risks of Odomzo including but not limited to nausea, vomiting, diarrhea, constipation, weight loss, changes in the sense of taste, decreased appetite, muscle spasms, and hair loss.  The patient verbalized understanding of the proper use and possible adverse effects of Odomzo.  All of the patient's questions and concerns were addressed. Imiquimod Counseling:  I discussed with the patient the risks of imiquimod including but not limited to erythema, scaling, itching, weeping, crusting, and pain.  Patient understands that the inflammatory response to imiquimod is variable from person to person and was educated regarded proper titration schedule.  If flu-like symptoms develop, patient knows to discontinue the medication and contact us. Thalidomide Counseling: I discussed with the patient the risks of thalidomide including but not limited to birth defects, anxiety, weakness, chest pain, dizziness, cough and severe allergy. Bactrim Pregnancy And Lactation Text: This medication is Pregnancy Category D and is known to cause fetal risk.  It is also excreted in breast milk. Zyclara Counseling:  I discussed with the patient the risks of imiquimod including but not limited to erythema, scaling, itching, weeping, crusting, and pain.  Patient understands that the inflammatory response to imiquimod is variable from person to person and was educated regarded proper titration schedule.  If flu-like symptoms develop, patient knows to discontinue the medication and contact us. Doxycycline Pregnancy And Lactation Text: This medication is Pregnancy Category D and not consider safe during pregnancy. It is also excreted in breast milk but is considered safe for shorter treatment courses. Cephalexin Pregnancy And Lactation Text: This medication is Pregnancy Category B and considered safe during pregnancy.  It is also excreted in breast milk but can be used safely for shorter doses. Acitretin Counseling:  I discussed with the patient the risks of acitretin including but not limited to hair loss, dry lips/skin/eyes, liver damage, hyperlipidemia, depression/suicidal ideation, photosensitivity.  Serious rare side effects can include but are not limited to pancreatitis, pseudotumor cerebri, bony changes, clot formation/stroke/heart attack.  Patient understands that alcohol is contraindicated since it can result in liver toxicity and significantly prolong the elimination of the drug by many years. Nsaids Pregnancy And Lactation Text: These medications are considered safe up to 30 weeks gestation. It is excreted in breast milk. Nsaids Counseling: NSAID Counseling: I discussed with the patient that NSAIDs should be taken with food. Prolonged use of NSAIDs can result in the development of stomach ulcers.  Patient advised to stop taking NSAIDs if abdominal pain occurs.  The patient verbalized understanding of the proper use and possible adverse effects of NSAIDs.  All of the patient's questions and concerns were addressed. Minocycline Pregnancy And Lactation Text: This medication is Pregnancy Category D and not consider safe during pregnancy. It is also excreted in breast milk. Fluconazole Counseling:  Patient counseled regarding adverse effects of fluconazole including but not limited to headache, diarrhea, nausea, upset stomach, liver function test abnormalities, taste disturbance, and stomach pain.  There is a rare possibility of liver failure that can occur when taking fluconazole.  The patient understands that monitoring of LFTs and kidney function test may be required, especially at baseline. The patient verbalized understanding of the proper use and possible adverse effects of fluconazole.  All of the patient's questions and concerns were addressed. Hydroxyzine Counseling: Patient advised that the medication is sedating and not to drive a car after taking this medication.  Patient informed of potential adverse effects including but not limited to dry mouth, urinary retention, and blurry vision.  The patient verbalized understanding of the proper use and possible adverse effects of hydroxyzine.  All of the patient's questions and concerns were addressed. Itraconazole Counseling:  I discussed with the patient the risks of itraconazole including but not limited to liver damage, nausea/vomiting, neuropathy, and severe allergy.  The patient understands that this medication is best absorbed when taken with acidic beverages such as non-diet cola or ginger ale.  The patient understands that monitoring is required including baseline LFTs and repeat LFTs at intervals.  The patient understands that they are to contact us or the primary physician if concerning signs are noted. Topical Clindamycin Pregnancy And Lactation Text: This medication is Pregnancy Category B and is considered safe during pregnancy. It is unknown if it is excreted in breast milk. Griseofulvin Pregnancy And Lactation Text: This medication is Pregnancy Category X and is known to cause serious birth defects. It is unknown if this medication is excreted in breast milk but breast feeding should be avoided. Acitretin Pregnancy And Lactation Text: This medication is Pregnancy Category X and should not be given to women who are pregnant or may become pregnant in the future. This medication is excreted in breast milk. Detail Level: Simple Erythromycin Pregnancy And Lactation Text: This medication is Pregnancy Category B and is considered safe during pregnancy. It is also excreted in breast milk. Bexarotene Counseling:  I discussed with the patient the risks of bexarotene including but not limited to hair loss, dry lips/skin/eyes, liver abnormalities, hyperlipidemia, pancreatitis, depression/suicidal ideation, photosensitivity, drug rash/allergic reactions, hypothyroidism, anemia, leukopenia, infection, cataracts, and teratogenicity.  Patient understands that they will need regular blood tests to check lipid profile, liver function tests, white blood cell count, thyroid function tests and pregnancy test if applicable. Topical Retinoid counseling:  Patient advised to apply a pea-sized amount only at bedtime and wait 30 minutes after washing their face before applying.  If too drying, patient may add a non-comedogenic moisturizer. The patient verbalized understanding of the proper use and possible adverse effects of retinoids.  All of the patient's questions and concerns were addressed. Dupixent Counseling: I discussed with the patient the risks of dupilumab including but not limited to eye infection and irritation, cold sores, injection site reactions, worsening of asthma, allergic reactions and increased risk of parasitic infection.  Live vaccines should be avoided while taking dupilumab. Dupilumab will also interact with certain medications such as warfarin and cyclosporine. The patient understands that monitoring is required and they must alert us or the primary physician if symptoms of infection or other concerning signs are noted. Methotrexate Counseling:  Patient counseled regarding adverse effects of methotrexate including but not limited to nausea, vomiting, abnormalities in liver function tests. Patients may develop mouth sores, rash, diarrhea, and abnormalities in blood counts. The patient understands that monitoring is required including LFT's and blood counts.  There is a rare possibility of scarring of the liver and lung problems that can occur when taking methotrexate. Persistent nausea, loss of appetite, pale stools, dark urine, cough, and shortness of breath should be reported immediately. Patient advised to discontinue methotrexate treatment at least three months before attempting to become pregnant.  I discussed the need for folate supplements while taking methotrexate.  These supplements can decrease side effects during methotrexate treatment. The patient verbalized understanding of the proper use and possible adverse effects of methotrexate.  All of the patient's questions and concerns were addressed. Protopic Counseling: Patient may experience a mild burning sensation during topical application. Protopic is not approved in children less than 2 years of age. There have been case reports of hematologic and skin malignancies in patients using topical calcineurin inhibitors although causality is questionable. Enbrel Counseling:  I discussed with the patient the risks of etanercept including but not limited to myelosuppression, immunosuppression, autoimmune hepatitis, demyelinating diseases, lymphoma, and infections.  The patient understands that monitoring is required including a PPD at baseline and must alert us or the primary physician if symptoms of infection or other concerning signs are noted. Azathioprine Counseling:  I discussed with the patient the risks of azathioprine including but not limited to myelosuppression, immunosuppression, hepatotoxicity, lymphoma, and infections.  The patient understands that monitoring is required including baseline LFTs, Creatinine, possible TPMP genotyping and weekly CBCs for the first month and then every 2 weeks thereafter.  The patient verbalized understanding of the proper use and possible adverse effects of azathioprine.  All of the patient's questions and concerns were addressed. Valtrex Pregnancy And Lactation Text: this medication is Pregnancy Category B and is considered safe during pregnancy. This medication is not directly found in breast milk but it's metabolite acyclovir is present. Hydroquinone Counseling:  Patient advised that medication may result in skin irritation, lightening (hypopigmentation), dryness, and burning.  In the event of skin irritation, the patient was advised to reduce the amount of the drug applied or use it less frequently.  Rarely, spots that are treated with hydroquinone can become darker (pseudoochronosis).  Should this occur, patient instructed to stop medication and call the office. The patient verbalized understanding of the proper use and possible adverse effects of hydroquinone.  All of the patient's questions and concerns were addressed. Metronidazole Pregnancy And Lactation Text: This medication is Pregnancy Category B and considered safe during pregnancy.  It is also excreted in breast milk. Minoxidil Counseling: Minoxidil is a topical medication which can increase blood flow where it is applied. It is uncertain how this medication increases hair growth. Side effects are uncommon and include stinging and allergic reactions. Doxycycline Counseling:  Patient counseled regarding possible photosensitivity and increased risk for sunburn.  Patient instructed to avoid sunlight, if possible.  When exposed to sunlight, patients should wear protective clothing, sunglasses, and sunscreen.  The patient was instructed to call the office immediately if the following severe adverse effects occur:  hearing changes, easy bruising/bleeding, severe headache, or vision changes.  The patient verbalized understanding of the proper use and possible adverse effects of doxycycline.  All of the patient's questions and concerns were addressed.

## 2025-07-14 NOTE — CONSULT NOTE ADULT - SUBJECTIVE AND OBJECTIVE BOX
EP ATTENDING      Office patient admitted with new onset SVT.  Formal consult pending later today.  ===============INCOMPLETE=========================      HISTORY OF PRESENT ILLNESS: HPI:  Patient is a 45F with a PMH of CVA in 2023, pAFib on eliquis, CAD, HTN, HLD who presents to the ED for tachycardia.  Patient states that he was sitting on his bed yesterday evening when he got an alert from his watch.  Watch reported that his HR was elevated >120 for ten minutes.  Patient states that he was asymptomatic, denies history of CP, palpitations, dyspnea, dizziness, lightheadedness.  Notes his HR improved slightly before going to sleep.  When patient woke up today, he noted that his HR was still around 120.  Presented to the ED for evaluation.  While in ED, patient noted to have SVT as high as 149 - resolved with vagal manuevers at the bedside.  Patient states that he has remained asymptomatic since onset of tachycardia.  Nonsmoker, social drinker, denies recreational drugs.  Will admit to tele (13 Jul 2025 17:41)      PAST MEDICAL & SURGICAL HISTORY:  Congestive heart failure      HTN (hypertension)      HLD (hyperlipidemia)      Paroxysmal atrial fibrillation      History of CVA in adulthood      No significant past surgical history            MEDICATIONS  (STANDING):  apixaban 5 milliGRAM(s) Oral two times a day  aspirin enteric coated 81 milliGRAM(s) Oral daily  furosemide    Tablet 40 milliGRAM(s) Oral daily  metoprolol tartrate 50 milliGRAM(s) Oral three times a day  sacubitril 97 mG/valsartan 103 mG 1 Tablet(s) Oral two times a day      Allergies    No Known Allergies    Intolerances        FAMILY HISTORY:  FH: HTN (hypertension)      Non-contributary for premature coronary disease or sudden cardiac death    SOCIAL HISTORY:    [ ] Non-smoker  [ ] Smoker  [ ] Alcohol      REVIEW OF SYSTEMS:  [ ]chest pain  [  ]shortness of breath  [  ]palpitations  [  ]syncope  [ ]near syncope [ ]upper extremity weakness   [ ] lower extremity weakness  [  ]diplopia  [  ]altered mental status   [  ]fevers  [ ]chills [ ]nausea  [ ]vomitting  [  ]dysphagia    [ ]abdominal pain  [ ]melena  [ ]BRBPR    [  ]epistaxis  [  ]rash    [ ]lower extremity edema        [ ] All others negative	  [ ] Unable to obtain    PHYSICAL EXAM:  T(C): 36.7 (07-13-25 @ 22:22), Max: 36.7 (07-13-25 @ 19:17)  HR: 96 (07-13-25 @ 22:22) (94 - 145)  BP: 152/93 (07-13-25 @ 22:22) (110/90 - 152/93)  RR: 18 (07-13-25 @ 22:22) (16 - 19)  SpO2: 100% (07-13-25 @ 22:22) (98% - 100%)  Wt(kg): --    Appearance: Normal	  HEENT:   Normal oral mucosa, PERRL, EOMI	  Lymphatic: No lymphadenopathy , no edema  Cardiovascular: Normal S1 S2, No JVD, No murmurs , Peripheral pulses palpable 2+ bilaterally  Respiratory: Lungs clear to auscultation, normal effort 	  Gastrointestinal:  Soft, Non-tender, + BS	  Skin: No rashes, No ecchymoses, No cyanosis, warm to touch  Musculoskeletal: Normal range of motion, normal strength  Psychiatry:  Mood & affect appropriate      TELEMETRY: 	    ECG:  	    Echo:  NST:  Cath:  	  	  LABS:	 	                          15.7   7.16  )-----------( 154      ( 13 Jul 2025 11:20 )             49.6     07-13    141  |  106  |  12  ----------------------------<  110[H]  3.9   |  22  |  1.30    Ca    8.8      13 Jul 2025 11:20  Mg     1.90     07-13    TPro  7.0  /  Alb  3.8  /  TBili  0.8  /  DBili  x   /  AST  12  /  ALT  7   /  AlkPhos  58  07-13    proBNP:   Lipid Profile:   HgA1c:   TSH:     ASSESSMENT/PLAN: 	45yMmaranda Mahoney M.D.  Cardiac Electrophysiology    office [phone]  pager [phone] EP ATTENDING    HISTORY OF PRESENT ILLNESS: HPI:  Patient is a 45F with a PMH of CVA in 2023, pAFib on eliquis, CAD, HTN, HLD who presents to the ED for tachycardia.  Patient states that he was sitting on his bed yesterday evening when he got an alert from his watch.  Watch reported that his HR was elevated >120 for ten minutes.  Patient states that he was asymptomatic, denies history of CP, palpitations, dyspnea, dizziness, lightheadedness.  Notes his HR improved slightly before going to sleep.  When patient woke up today, he noted that his HR was still around 120.  Presented to the ED for evaluation.  While in ED, patient noted to have SVT as high as 149 - resolved with vagal manuevers at the bedside.  Patient states that he has remained asymptomatic since onset of tachycardia.  Nonsmoker, social drinker, denies recreational drugs.  Will admit to tele (13 Jul 2025 17:41)    Mr Vuong is a very pleasant 45yoM, self-referred to ED from home for high heartrate identified on AppleWatch and pulse-ox.  Sees our colleague Dr Lyons for general cardiology.  ~6 years ago, patient had a diagnosis of congestive heart failure, LVEF was in the 40s.  LVEF improved on Coreg + Entresto.  He's lost ~100 lbs with diet and exercise (no drugs, no surgeries).  He may have had sleep apnea earlier on, but more recent sleep studies definitively diagnosed NO sleep disordered breathing.  First had AFib diagnosed 2.5yrs ago.  At that point, [id number] score was 2 for hypertension and CHF, but given his young age and good control of his health, no anticoagulation was prescribed.  He then had a stroke (2024?), and [id number] score was 4. He was then prescribed Apixaban 5mg BID, and has been on this medication ever since.  Hes had a few documented episodes of paroxysmal AFib, most converting spontaneously within a few hours. This episode is lasting 2-3 days at this time.  He is not experiencing chest pain, shortness of breath on exertion, palpitations, lightheadedness, or any other identifiable symptoms, but is kept aware due to his AppleWatch alerting him for persistently high heartrates.  A 10 pt ROS is otherwise negative.      PAST MEDICAL & SURGICAL HISTORY:  Congestive heart failure  HTN (hypertension)  HLD (hyperlipidemia)  Paroxysmal atrial fibrillation  History of CVA in adulthood  No significant past surgical history      MEDICATIONS  (STANDING):  apixaban 5 milliGRAM(s) Oral two times a day  aspirin enteric coated 81 milliGRAM(s) Oral daily  furosemide    Tablet 40 milliGRAM(s) Oral daily  metoprolol tartrate 50 milliGRAM(s) Oral three times a day  sacubitril 97 mG/valsartan 103 mG 1 Tablet(s) Oral two times a day    Allergies    No Known Allergies    Intolerances    FAMILY HISTORY:  FH: HTN (hypertension)    Non-contributary for premature coronary disease or sudden cardiac death    SOCIAL HISTORY:    [ x] Non-smoker  [ ] Smoker  [ ] Alcohol    PHYSICAL EXAM:  T(C): 36.7 (07-13-25 @ 22:22), Max: 36.7 (07-13-25 @ 19:17)  HR: 96 (07-13-25 @ 22:22) (94 - 145)  BP: 152/93 (07-13-25 @ 22:22) (110/90 - 152/93)  RR: 18 (07-13-25 @ 22:22) (16 - 19)  SpO2: 100% (07-13-25 @ 22:22) (98% - 100%)  Wt(kg): --    General: Well nourished, no acute distress, alert and oriented x 3  Head: normocephalic, no trauma  Neck: no JVD, no bruit, supple, not enlarged  CV: rapid irregular S1S2, no murmurs.    Lungs: clear BL, no rales or wheezes  Abdomen: bowel sounds +, soft, nontender, nondistended  Extremities: no clubbing, cyanosis or edema  Neuro: Moves all 4 extremities, sensation intact x 4 extremities  Skin: warm and moist, normal turgor  Psych: Mood and affect are appropriate for circumstances  MSK: normal range of motion and strength x4 extremities.      TELEMETRY: ATach with variable AV conduction. Some episodes of AFib. 	    ECG: AT (+V1, +II,III).  SVT (likely 2:1 AT) at 143bpm. 	  Echo:  Office, 5/2025  LVEF 50%  LVIDd <5cm (no longer dilated), LA 5.4cm in anterior posterior dimension.  Mild LVH.    	  	  LABS:	 	                          15.7   7.16  )-----------( 154      ( 13 Jul 2025 11:20 )             49.6     07-13    141  |  106  |  12  ----------------------------<  110[H]  3.9   |  22  |  1.30    Ca    8.8      13 Jul 2025 11:20  Mg     1.90     07-13    TPro  7.0  /  Alb  3.8  /  TBili  0.8  /  DBili  x   /  AST  12  /  ALT  7   /  AlkPhos  58  07-13    ASSESSMENT/PLAN: Mr Vuong is a pleasant 45y Male here with persistently elevated heartrate at home.  Ongoing diagnosis of paroxysmal AFib, working its way toward persistent duration.  Has heart failure with recovered LVEF% on good GDMT with beta blockers and entresto.  Nonischemic cardiomyopathy, NYHA II, likely due to prior obesity and hypertension.  Has lost 100lbs+ with diet and exercise.  His LV was previously dilated and has remodeled to normal diameter, but his left atrium remains severely dilated.  As he is not symptomatic from his AFib, I could defer a cardioversion.  However, in the setting of low-normal (50%) EF% and prior significant CHF history, he should undergo ablation.  If this can be accomplished ASAP in the hospital, will keep him inpatient to complete it.  He is reluctant to stay for very long, as he cannot miss too many more days of work.  If scheduling is not manageable for him, would release him on a rate-control strategy with high dose metoprolol, and have him scheduled w/ Dr Cintron.  Continue metoprolol 150mg/day.  Continue entresto.  Continue apixaban 5mg BID for secondary prevention of stroke.  Plan of care pending EP lab availability.    Og Mahoney M.D.  Cardiac Electrophysiology  office [phone]  pager [phone]

## 2025-07-14 NOTE — PATIENT PROFILE ADULT - NSPROSPHOSPCHAPLAINYN_GEN_A_NUR
Last office visit date: 08/03/2023    Next appointment scheduled?: Yes     Number of refills given: 1     no

## 2025-07-14 NOTE — PATIENT PROFILE ADULT - FALL HARM RISK - UNIVERSAL INTERVENTIONS
Bed in lowest position, wheels locked, appropriate side rails in place/Call bell, personal items and telephone in reach/Instruct patient to call for assistance before getting out of bed or chair/Non-slip footwear when patient is out of bed/[place] to call system/Physically safe environment - no spills, clutter or unnecessary equipment/Purposeful Proactive Rounding/Room/bathroom lighting operational, light cord in reach

## 2025-07-15 ENCOUNTER — RESULT REVIEW (OUTPATIENT)
Age: 45
End: 2025-07-15

## 2025-07-15 LAB
ANION GAP SERPL CALC-SCNC: 13 MMOL/L — SIGNIFICANT CHANGE UP (ref 7–14)
BUN SERPL-MCNC: 21 MG/DL — SIGNIFICANT CHANGE UP (ref 7–23)
CALCIUM SERPL-MCNC: 8.6 MG/DL — SIGNIFICANT CHANGE UP (ref 8.4–10.5)
CHLORIDE SERPL-SCNC: 105 MMOL/L — SIGNIFICANT CHANGE UP (ref 98–107)
CO2 SERPL-SCNC: 20 MMOL/L — LOW (ref 22–31)
CREAT SERPL-MCNC: 1.18 MG/DL — SIGNIFICANT CHANGE UP (ref 0.5–1.3)
EGFR: 78 ML/MIN/1.73M2 — SIGNIFICANT CHANGE UP
EGFR: 78 ML/MIN/1.73M2 — SIGNIFICANT CHANGE UP
GLUCOSE SERPL-MCNC: 93 MG/DL — SIGNIFICANT CHANGE UP (ref 70–99)
HCT VFR BLD CALC: 45.2 % — SIGNIFICANT CHANGE UP (ref 39–50)
HGB BLD-MCNC: 14.5 G/DL — SIGNIFICANT CHANGE UP (ref 13–17)
MAGNESIUM SERPL-MCNC: 1.8 MG/DL — SIGNIFICANT CHANGE UP (ref 1.6–2.6)
MCHC RBC-ENTMCNC: 25 PG — LOW (ref 27–34)
MCHC RBC-ENTMCNC: 32.1 G/DL — SIGNIFICANT CHANGE UP (ref 32–36)
MCV RBC AUTO: 77.8 FL — LOW (ref 80–100)
MRSA PCR RESULT.: SIGNIFICANT CHANGE UP
NRBC # BLD AUTO: 0 K/UL — SIGNIFICANT CHANGE UP (ref 0–0)
NRBC # FLD: 0 K/UL — SIGNIFICANT CHANGE UP (ref 0–0)
PLATELET # BLD AUTO: 127 K/UL — LOW (ref 150–400)
PMV BLD: SIGNIFICANT CHANGE UP FL (ref 7–13)
POTASSIUM SERPL-MCNC: 3.7 MMOL/L — SIGNIFICANT CHANGE UP (ref 3.5–5.3)
POTASSIUM SERPL-SCNC: 3.7 MMOL/L — SIGNIFICANT CHANGE UP (ref 3.5–5.3)
RBC # BLD: 5.81 M/UL — HIGH (ref 4.2–5.8)
RBC # FLD: 14.9 % — HIGH (ref 10.3–14.5)
S AUREUS DNA NOSE QL NAA+PROBE: SIGNIFICANT CHANGE UP
SODIUM SERPL-SCNC: 138 MMOL/L — SIGNIFICANT CHANGE UP (ref 135–145)
WBC # BLD: 7.83 K/UL — SIGNIFICANT CHANGE UP (ref 3.8–10.5)
WBC # FLD AUTO: 7.83 K/UL — SIGNIFICANT CHANGE UP (ref 3.8–10.5)

## 2025-07-15 PROCEDURE — 93312 ECHO TRANSESOPHAGEAL: CPT | Mod: 26

## 2025-07-15 PROCEDURE — 76376 3D RENDER W/INTRP POSTPROCES: CPT | Mod: 26

## 2025-07-15 PROCEDURE — 93320 DOPPLER ECHO COMPLETE: CPT | Mod: 26,GC

## 2025-07-15 PROCEDURE — 93325 DOPPLER ECHO COLOR FLOW MAPG: CPT | Mod: 26,GC

## 2025-07-15 PROCEDURE — 99232 SBSQ HOSP IP/OBS MODERATE 35: CPT

## 2025-07-15 RX ADMIN — Medication 1 APPLICATION(S): at 15:33

## 2025-07-15 RX ADMIN — METOPROLOL SUCCINATE 50 MILLIGRAM(S): 50 TABLET, EXTENDED RELEASE ORAL at 22:03

## 2025-07-15 RX ADMIN — APIXABAN 5 MILLIGRAM(S): 5 TABLET, FILM COATED ORAL at 18:22

## 2025-07-15 RX ADMIN — APIXABAN 5 MILLIGRAM(S): 5 TABLET, FILM COATED ORAL at 06:34

## 2025-07-15 RX ADMIN — Medication 81 MILLIGRAM(S): at 15:30

## 2025-07-15 RX ADMIN — FUROSEMIDE 40 MILLIGRAM(S): 10 INJECTION INTRAMUSCULAR; INTRAVENOUS at 06:34

## 2025-07-15 NOTE — PROGRESS NOTE ADULT - SUBJECTIVE AND OBJECTIVE BOX
EP ATTENDING    HISTORY OF PRESENT ILLNESS: HPI:  Patient is a 45F with a PMH of CVA in 2023, pAFib on eliquis, CAD, HTN, HLD who presents to the ED for tachycardia.  Patient states that he was sitting on his bed yesterday evening when he got an alert from his watch.  Watch reported that his HR was elevated >120 for ten minutes.  Patient states that he was asymptomatic, denies history of CP, palpitations, dyspnea, dizziness, lightheadedness.  Notes his HR improved slightly before going to sleep.  When patient woke up today, he noted that his HR was still around 120.  Presented to the ED for evaluation.  While in ED, patient noted to have SVT as high as 149 - resolved with vagal manuevers at the bedside.  Patient states that he has remained asymptomatic since onset of tachycardia.  Nonsmoker, social drinker, denies recreational drugs.  Will admit to tele (13 Jul 2025 17:41)    Mr Vuong is a very pleasant 45yoM, self-referred to ED from home for high heartrate identified on AppleWatch and pulse-ox.  Sees our colleague Dr Lyons for general cardiology.  ~6 years ago, patient had a diagnosis of congestive heart failure, LVEF was in the 40s.  LVEF improved on Coreg + Entresto.  He's lost ~100 lbs with diet and exercise (no drugs, no surgeries).  He may have had sleep apnea earlier on, but more recent sleep studies definitively diagnosed NO sleep disordered breathing.  First had AFib diagnosed 2.5yrs ago.  At that point, [id number] score was 2 for hypertension and CHF, but given his young age and good control of his health, no anticoagulation was prescribed.  He then had a stroke (2024?), and [id number] score was 4. He was then prescribed Apixaban 5mg BID, and has been on this medication ever since.  Hes had a few documented episodes of paroxysmal AFib, most converting spontaneously within a few hours. This episode is lasting 2-3 days at this time.  He is not experiencing chest pain, shortness of breath on exertion, palpitations, lightheadedness, or any other identifiable symptoms, but is kept aware due to his AppleWatch alerting him for persistently high heartrates.  A 10 pt ROS is otherwise negative.  Date of service 7/15- converted to sinus spontaneously overnight.  in AFib, echo with reduced LV systolic function, and notably severe LV hypertrophy.    Awaiting pre-op KIT today, then ablation tomorrow.      PAST MEDICAL & SURGICAL HISTORY:  Congestive heart failure  HTN (hypertension)  HLD (hyperlipidemia)  Paroxysmal atrial fibrillation  History of CVA in adulthood  No significant past surgical history    apixaban 5 milliGRAM(s) Oral two times a day  aspirin enteric coated 81 milliGRAM(s) Oral daily  chlorhexidine 2% Cloths 1 Application(s) Topical daily  furosemide    Tablet 40 milliGRAM(s) Oral daily  metoprolol tartrate 50 milliGRAM(s) Oral three times a day                          14.5   7.83  )-----------( 127      ( 15 Jul 2025 06:20 )             45.2       07-15    138  |  105  |  21  ----------------------------<  93  3.7   |  20[L]  |  1.18    Ca    8.6      15 Jul 2025 06:20  Mg     1.80     07-15    TPro  6.7  /  Alb  3.5  /  TBili  0.6  /  DBili  x   /  AST  12  /  ALT  9   /  AlkPhos  53  07-14    T(C): 36.6 (07-15-25 @ 12:04), Max: 36.9 (07-14-25 @ 14:27)  HR: 49 (07-15-25 @ 12:04) (45 - 146)  BP: 127/90 (07-15-25 @ 12:04) (101/63 - 127/90)  RR: 18 (07-15-25 @ 12:04) (18 - 18)  SpO2: 98% (07-15-25 @ 12:04) (94% - 100%)  Wt(kg): --    I&O's Summary    General: Well nourished, no acute distress, alert and oriented x 3  Head: normocephalic, no trauma  Neck: no JVD, no bruit, supple, not enlarged  CV: rapid irregular S1S2, no murmurs.    Lungs: clear BL, no rales or wheezes  Abdomen: bowel sounds +, soft, nontender, nondistended  Extremities: no clubbing, cyanosis or edema  Neuro: Moves all 4 extremities, sensation intact x 4 extremities  Skin: warm and moist, normal turgor  Psych: Mood and affect are appropriate for circumstances  MSK: normal range of motion and strength x4 extremities.      TELEMETRY: ATach with variable AV conduction. Some episodes of AFib. 	    ECG: AT (+V1, +II,III).  SVT (likely 2:1 AT) at 143bpm. 	  Echo:    Office, 5/2025  LVEF 50% LVIDd <5cm (no longer dilated), LA 5.4cm in anterior posterior dimension.  Mild LVH.    < from: TTE W or WO Ultrasound Enhancing Agent (07.14.25 @ 12:03) >  CONCLUSIONS:      1. Left ventricular cavity is normal in size. Left ventricular systolic function is moderately decreased with an ejection fraction of 39 % by Prado's method of disks. Global left ventricular hypokinesis.   2. Consider infiltrative cardiomyopathy. Severe left ventricular hypertrophy.   3. Normal right ventricular cavity size, with increased wall thickness, and reduced right ventricular systolic function.   4. Left atrium is mildly dilated.   5. No significant valvular disease.   6. No pericardial effusion seen.   7. Right pleural effusion noted.   8. The inferior vena cava is normal in size measuring 0.98 cm in diameter, (normal <2.1cm) with normal inspiratory collapse (normal >50%) consistent with normal right atrial pressure (~3, range 0-5mmHg).    < end of copied text >    	  LABS:	 	                          15.7   7.16  )-----------( 154      ( 13 Jul 2025 11:20 )             49.6     07-13    141  |  106  |  12  ----------------------------<  110[H]  3.9   |  22  |  1.30    Ca    8.8      13 Jul 2025 11:20  Mg     1.90     07-13    TPro  7.0  /  Alb  3.8  /  TBili  0.8  /  DBili  x   /  AST  12  /  ALT  7   /  AlkPhos  58  07-13    ASSESSMENT/PLAN: Mr Vuong is a pleasant 45y Male here with persistently elevated heartrate at home.  Paroxysmal AFib (spontaneously converted to sinus after ~72hrs, longest episode yet).  Has heart failure with recovered LVEF% on good GDMT with beta blockers and entresto.  Nonischemic cardiomyopathy, NYHA II, likely due to prior obesity and hypertension.  Has lost 100lbs+ with diet and exercise.  LVEF during AFib down from 50 --> 39%.    KIT today.  Continue metoprolol 150mg/day.  OK with resting HR of 50bpm for a young patient.  Entresto on hold this morning after AM dose.  Hold through ablation and resume post-op.  (This high of a dose can cause intra-op hypotension)  Continue apixaban 5mg BID for secondary prevention of stroke.  Ablation on Wednesday PM.  NPO and type+screens ordered.  Anticipate home Thursday.  Outpatient elective Cardiac MRI re: severe LV hypertrophy, workup for infiltrative cardiomyopathy.    Og Mahoney M.D.  Cardiac Electrophysiology  office [phone]  pager [phone]

## 2025-07-15 NOTE — PROGRESS NOTE ADULT - SUBJECTIVE AND OBJECTIVE BOX
Patient is a 45y old  Male who presents with a chief complaint of SVT vs A Flutter (14 Jul 2025 12:57)    SUBJECTIVE / OVERNIGHT EVENTS:  Pt reports he feels well without complaints.  No chest pain, dizziness, palpitations, dyspnea fever or chills.    MEDICATIONS  (STANDING):  apixaban 5 milliGRAM(s) Oral two times a day  aspirin enteric coated 81 milliGRAM(s) Oral daily  chlorhexidine 2% Cloths 1 Application(s) Topical daily  furosemide    Tablet 40 milliGRAM(s) Oral daily  metoprolol tartrate 50 milliGRAM(s) Oral three times a day    MEDICATIONS  (PRN):      Vital Signs Last 24 Hrs  T(C): 36.6 (15 Jul 2025 20:11), Max: 36.9 (15 Jul 2025 13:15)  T(F): 97.9 (15 Jul 2025 20:11), Max: 98.5 (15 Jul 2025 13:15)  HR: 59 (15 Jul 2025 20:11) (45 - 62)  BP: 127/77 (15 Jul 2025 20:11) (101/63 - 149/96)  BP(mean): --  RR: 17 (15 Jul 2025 20:11) (12 - 18)  SpO2: 98% (15 Jul 2025 20:11) (94% - 100%)    Parameters below as of 15 Jul 2025 15:30  Patient On (Oxygen Delivery Method): room air          PHYSICAL EXAM:  CONSTITUTIONAL: NAD, well-developed, well-groomed  RESPIRATORY: Normal respiratory effort; lungs are clear to auscultation bilaterally  CARDIOVASCULAR: Bradycardic, Regular rhythm, normal S1 and S2, no murmur/rub/gallop; No lower extremity edema  ABDOMEN: Nontender to palpation, normoactive bowel sounds, no rebound/guarding;  PSYCH: affect appropriate  SKIN: No rashes; no palpable lesions    LABS:                        14.5   7.83  )-----------( 127      ( 15 Jul 2025 06:20 )             45.2     07-15    138  |  105  |  21  ----------------------------<  93  3.7   |  20[L]  |  1.18    Ca    8.6      15 Jul 2025 06:20  Mg     1.80     07-15    TPro  6.7  /  Alb  3.5  /  TBili  0.6  /  DBili  x   /  AST  12  /  ALT  9   /  AlkPhos  53  07-14          Urinalysis Basic - ( 15 Jul 2025 06:20 )    Color: x / Appearance: x / SG: x / pH: x  Gluc: 93 mg/dL / Ketone: x  / Bili: x / Urobili: x   Blood: x / Protein: x / Nitrite: x   Leuk Esterase: x / RBC: x / WBC x   Sq Epi: x / Non Sq Epi: x / Bacteria: x        RADIOLOGY & ADDITIONAL TESTS:    Imaging Personally Reviewed:    Care Discussed with Consultants/Other Providers:

## 2025-07-15 NOTE — PROGRESS NOTE ADULT - SUBJECTIVE AND OBJECTIVE BOX
DATE OF SERVICE: 07-15-25    Patient denies chest pain or shortness of breath.   Review of symptoms otherwise negative.    MEDICATIONS:  apixaban 5 milliGRAM(s) Oral two times a day  aspirin enteric coated 81 milliGRAM(s) Oral daily  chlorhexidine 2% Cloths 1 Application(s) Topical daily  furosemide    Tablet 40 milliGRAM(s) Oral daily  metoprolol tartrate 50 milliGRAM(s) Oral three times a day      LABS:                        14.5   7.83  )-----------( 127      ( 15 Jul 2025 06:20 )             45.2       Hemoglobin: 14.5 g/dL (07-15 @ 06:20)  Hemoglobin: 15.8 g/dL (07-14 @ 08:38)  Hemoglobin: 15.7 g/dL (07-13 @ 11:20)      07-15    138  |  105  |  21  ----------------------------<  93  3.7   |  20[L]  |  1.18    Ca    8.6      15 Jul 2025 06:20  Mg     1.80     07-15    TPro  6.7  /  Alb  3.5  /  TBili  0.6  /  DBili  x   /  AST  12  /  ALT  9   /  AlkPhos  53  07-14    Creatinine Trend: 1.18<--, 1.12<--, 1.30<--    COAGS:           PHYSICAL EXAM:  T(C): 36.8 (07-15-25 @ 04:00), Max: 36.9 (07-14-25 @ 14:27)  HR: 45 (07-15-25 @ 04:00) (45 - 146)  BP: 122/80 (07-15-25 @ 04:00) (113/82 - 125/94)  RR: 18 (07-15-25 @ 04:00) (18 - 18)  SpO2: 100% (07-15-25 @ 04:00) (98% - 100%)  Wt(kg): --    I&O's Summary        Gen: Appears well in NAD  HEENT:  (-)icterus (-)pallor  CV: N S1 S2 1/6 SALLY (+)2 Pulses B/l  Resp:  Clear to ausculatation B/L, normal effort  GI: (+) BS Soft, NT, ND  Lymph:  (-)Edema, (-)obvious lymphadenopathy  Skin: Warm to touch, Normal turgor  Psych: Appropriate mood and affect      TELEMETRY: 	Sinus vianey    ECG:  	SVT vs Aflutter    TTE 5/15/25 in office:  LVEF 50% moderate concentric remodelingof LV with grade I diastolic dysfxn  severe LAE    NST 3/1/2023  No ischemia or infarct, EF 40%    < from: TTE W or WO Ultrasound Enhancing Agent (07.14.25 @ 12:03) >    CONCLUSIONS:      1. Left ventricular cavity is normal in size. Left ventricular systolic function is moderately decreased with an ejection fraction of 39 % by Prado's method of disks. Global left ventricular hypokinesis.   2. Consider infiltrative cardiomyopathy. Severe left ventricular hypertrophy.   3. Normal right ventricular cavity size, with increased wall thickness, and reduced right ventricular systolic function.   4. Left atrium is mildly dilated.   5. No significant valvular disease.   6. No pericardial effusion seen.   7. Right pleural effusion noted.   8. The inferior vena cava is normal in size measuring 0.98 cm in diameter, (normal <2.1cm) with normal inspiratory collapse (normal >50%) consistent with normal right atrial pressure (~3, range 0-5mmHg).    < end of copied text >      ASSESSMENT/PLAN: Patient is a 45F with a PMH of HTN, NICM s/p [facility] 2020 with non obs CAD, CVA in 2023, residual R sided weakness and speech deficit, PAF on Eliquis and HLD admitted with tachycardia, SVT vs Atrial Flutter.  -- Ep consulted, Dr Mahoney for KIT today and then  ablation tomorrow  -- Already on lifelong AC with ELiquis given hx AFib and CVA  -- Not in clinical CHF, recent office echo as noted above  -- cont Metoprolol for NICM  -- hold Entresto for procedures today and tomorrow    Helio Atkinson MD  Pager: [phone]  Office: [phone]

## 2025-07-16 DIAGNOSIS — I50.9 HEART FAILURE, UNSPECIFIED: ICD-10-CM

## 2025-07-16 LAB
ANION GAP SERPL CALC-SCNC: 11 MMOL/L — SIGNIFICANT CHANGE UP (ref 7–14)
BUN SERPL-MCNC: 18 MG/DL — SIGNIFICANT CHANGE UP (ref 7–23)
CALCIUM SERPL-MCNC: 8.6 MG/DL — SIGNIFICANT CHANGE UP (ref 8.4–10.5)
CHLORIDE SERPL-SCNC: 108 MMOL/L — HIGH (ref 98–107)
CO2 SERPL-SCNC: 22 MMOL/L — SIGNIFICANT CHANGE UP (ref 22–31)
CREAT SERPL-MCNC: 1.02 MG/DL — SIGNIFICANT CHANGE UP (ref 0.5–1.3)
EGFR: 92 ML/MIN/1.73M2 — SIGNIFICANT CHANGE UP
EGFR: 92 ML/MIN/1.73M2 — SIGNIFICANT CHANGE UP
GLUCOSE SERPL-MCNC: 94 MG/DL — SIGNIFICANT CHANGE UP (ref 70–99)
HCT VFR BLD CALC: 42.9 % — SIGNIFICANT CHANGE UP (ref 39–50)
HGB BLD-MCNC: 13.9 G/DL — SIGNIFICANT CHANGE UP (ref 13–17)
MAGNESIUM SERPL-MCNC: 1.8 MG/DL — SIGNIFICANT CHANGE UP (ref 1.6–2.6)
MCHC RBC-ENTMCNC: 24.9 PG — LOW (ref 27–34)
MCHC RBC-ENTMCNC: 32.4 G/DL — SIGNIFICANT CHANGE UP (ref 32–36)
MCV RBC AUTO: 76.9 FL — LOW (ref 80–100)
NRBC # BLD AUTO: 0 K/UL — SIGNIFICANT CHANGE UP (ref 0–0)
NRBC # FLD: 0 K/UL — SIGNIFICANT CHANGE UP (ref 0–0)
NRBC BLD AUTO-RTO: 0 /100 WBCS — SIGNIFICANT CHANGE UP (ref 0–0)
PHOSPHATE SERPL-MCNC: 3.3 MG/DL — SIGNIFICANT CHANGE UP (ref 2.5–4.5)
PLATELET # BLD AUTO: 125 K/UL — LOW (ref 150–400)
PMV BLD: 13 FL — SIGNIFICANT CHANGE UP (ref 7–13)
POTASSIUM SERPL-MCNC: 4 MMOL/L — SIGNIFICANT CHANGE UP (ref 3.5–5.3)
POTASSIUM SERPL-SCNC: 4 MMOL/L — SIGNIFICANT CHANGE UP (ref 3.5–5.3)
RBC # BLD: 5.58 M/UL — SIGNIFICANT CHANGE UP (ref 4.2–5.8)
RBC # FLD: 14.9 % — HIGH (ref 10.3–14.5)
RH IG SCN BLD-IMP: POSITIVE — SIGNIFICANT CHANGE UP
SODIUM SERPL-SCNC: 141 MMOL/L — SIGNIFICANT CHANGE UP (ref 135–145)
WBC # BLD: 7.5 K/UL — SIGNIFICANT CHANGE UP (ref 3.8–10.5)
WBC # FLD AUTO: 7.5 K/UL — SIGNIFICANT CHANGE UP (ref 3.8–10.5)

## 2025-07-16 PROCEDURE — 99232 SBSQ HOSP IP/OBS MODERATE 35: CPT

## 2025-07-16 RX ADMIN — APIXABAN 5 MILLIGRAM(S): 5 TABLET, FILM COATED ORAL at 06:21

## 2025-07-16 RX ADMIN — Medication 1 APPLICATION(S): at 17:16

## 2025-07-16 RX ADMIN — APIXABAN 5 MILLIGRAM(S): 5 TABLET, FILM COATED ORAL at 17:15

## 2025-07-16 RX ADMIN — Medication 81 MILLIGRAM(S): at 17:15

## 2025-07-16 RX ADMIN — FUROSEMIDE 40 MILLIGRAM(S): 10 INJECTION INTRAMUSCULAR; INTRAVENOUS at 06:21

## 2025-07-16 NOTE — PROGRESS NOTE ADULT - SUBJECTIVE AND OBJECTIVE BOX
EP ATTENDING    HISTORY OF PRESENT ILLNESS: HPI:  Patient is a 45F with a PMH of CVA in 2023, pAFib on eliquis, CAD, HTN, HLD who presents to the ED for tachycardia.  Patient states that he was sitting on his bed yesterday evening when he got an alert from his watch.  Watch reported that his HR was elevated >120 for ten minutes.  Patient states that he was asymptomatic, denies history of CP, palpitations, dyspnea, dizziness, lightheadedness.  Notes his HR improved slightly before going to sleep.  When patient woke up today, he noted that his HR was still around 120.  Presented to the ED for evaluation.  While in ED, patient noted to have SVT as high as 149 - resolved with vagal manuevers at the bedside.  Patient states that he has remained asymptomatic since onset of tachycardia.  Nonsmoker, social drinker, denies recreational drugs.  Will admit to tele (13 Jul 2025 17:41)    Mr Vuong is a very pleasant 45yoM, self-referred to ED from home for high heartrate identified on AppleWatch and pulse-ox.  Sees our colleague Dr Lyons for general cardiology.  ~6 years ago, patient had a diagnosis of congestive heart failure, LVEF was in the 40s.  LVEF improved on Coreg + Entresto.  He's lost ~100 lbs with diet and exercise (no drugs, no surgeries).  He may have had sleep apnea earlier on, but more recent sleep studies definitively diagnosed NO sleep disordered breathing.  First had AFib diagnosed 2.5yrs ago.  At that point, [id number] score was 2 for hypertension and CHF, but given his young age and good control of his health, no anticoagulation was prescribed.  He then had a stroke (2024?), and [id number] score was 4. He was then prescribed Apixaban 5mg BID, and has been on this medication ever since.  Hes had a few documented episodes of paroxysmal AFib, most converting spontaneously within a few hours. This episode is lasting 2-3 days at this time.  He is not experiencing chest pain, shortness of breath on exertion, palpitations, lightheadedness, or any other identifiable symptoms, but is kept aware due to his AppleWatch alerting him for persistently high heartrates.  A 10 pt ROS is otherwise negative.  7/15- converted to sinus spontaneously overnight.  in AFib, echo with reduced LV systolic function, and notably severe LV hypertrophy.    Awaiting pre-op KIT today, then ablation tomorrow.    Date of service 7/16- patient's ablation case had to be postponed to a later date.  feeling well, and will return to his room, eat, and await ablation on FRIDAY now.    PAST MEDICAL & SURGICAL HISTORY:  Congestive heart failure  HTN (hypertension)  HLD (hyperlipidemia)  Paroxysmal atrial fibrillation  History of CVA in adulthood  No significant past surgical history    apixaban 5 milliGRAM(s) Oral two times a day  aspirin enteric coated 81 milliGRAM(s) Oral daily  chlorhexidine 2% Cloths 1 Application(s) Topical daily  furosemide    Tablet 40 milliGRAM(s) Oral daily  metoprolol tartrate 50 milliGRAM(s) Oral three times a day                          13.9   7.50  )-----------( 125      ( 16 Jul 2025 06:40 )             42.9       07-16    141  |  108[H]  |  18  ----------------------------<  94  4.0   |  22  |  1.02    Ca    8.6      16 Jul 2025 06:40  Phos  3.3     07-16  Mg     1.80     07-16      T(C): 37.1 (07-16-25 @ 12:01), Max: 37.1 (07-16-25 @ 12:01)  HR: 51 (07-16-25 @ 12:01) (51 - 59)  BP: 149/101 (07-16-25 @ 12:01) (127/77 - 149/101)  RR: 17 (07-16-25 @ 12:01) (17 - 18)  SpO2: 98% (07-16-25 @ 12:01) (97% - 99%)  Wt(kg): --    I&O's Summary    General: Well nourished, no acute distress, alert and oriented x 3  Head: normocephalic, no trauma  Neck: no JVD, no bruit, supple, not enlarged  CV: rapid irregular S1S2, no murmurs.    Lungs: clear BL, no rales or wheezes  Abdomen: bowel sounds +, soft, nontender, nondistended  Extremities: no clubbing, cyanosis or edema  Neuro: Moves all 4 extremities, sensation intact x 4 extremities  Skin: warm and moist, normal turgor  Psych: Mood and affect are appropriate for circumstances  MSK: normal range of motion and strength x4 extremities.      TELEMETRY: ATach with variable AV conduction. Some episodes of AFib.  --> converted to sinus.	    ECG: AT (+V1, +II,III).  SVT (likely 2:1 AT) at 143bpm. 	  Echo:    Office, 5/2025  LVEF 50% LVIDd <5cm (no longer dilated), LA 5.4cm in anterior posterior dimension.  Mild LVH.    < from: TTE W or WO Ultrasound Enhancing Agent (07.14.25 @ 12:03) >  CONCLUSIONS:      1. Left ventricular cavity is normal in size. Left ventricular systolic function is moderately decreased with an ejection fraction of 39 % by Prado's method of disks. Global left ventricular hypokinesis.   2. Consider infiltrative cardiomyopathy. Severe left ventricular hypertrophy.   3. Normal right ventricular cavity size, with increased wall thickness, and reduced right ventricular systolic function.   4. Left atrium is mildly dilated.   5. No significant valvular disease.   6. No pericardial effusion seen.   7. Right pleural effusion noted.   8. The inferior vena cava is normal in size measuring 0.98 cm in diameter, (normal <2.1cm) with normal inspiratory collapse (normal >50%) consistent with normal right atrial pressure (~3, range 0-5mmHg).    < end of copied text >      < from: KIT W or WO Ultrasound Enhancing Agent (07.15.25 @ 12:27) >     CONCLUSIONS:      1. Left ventricular systolic function is mildly decreased. Global left ventricular hypokinesis.   2. Normal right ventricular cavity size and normal right ventricular systolic function.   3. Structurally normal mitral valve with normal leaflet excursion. There is trace mitral regurgitation.   4. The aortic valve appears trileaflet with normal systolic excursion. There is no evidence of aortic regurgitation.   5. No atheroma in the visualized portions of the proximal ascending aorta. No atheroma in the visualized portions of the transverse aortic arch. Mild non-mobile atheroma in the visualized portions of the descending aorta.   6. The left atrium is normal in size. There is no evidence of left atrial or left atrial appendage thrombus.   7. Agitated saline injection was negative for intracardiac shunt.    < end of copied text >      ASSESSMENT/PLAN: Mr Vuong is a pleasant 45y Male here with persistently elevated heartrate at home.  Paroxysmal AFib (spontaneously converted to sinus after ~72hrs, longest episode yet).  Has heart failure with recovered LVEF% on good GDMT with beta blockers and entresto.  Nonischemic cardiomyopathy, NYHA II, likely due to prior obesity and hypertension.  Has lost 100lbs+ with diet and exercise.  LVEF during AFib down from 50 --> 39%.    KIT reassuring. No thrombus.  Continue metoprolol 150mg/day.  OK with resting HR of 50bpm for a young patient.  Entresto on hold.  Can use lower dose ARB if needed between now and Friday.  Continue apixaban 5mg BID for secondary prevention of stroke.  Ablation moved to Friday AM.  Will need one more type+screen tube on Thursday.  Outpatient elective Cardiac MRI re: severe LV hypertrophy, workup for infiltrative cardiomyopathy.    Og Mahoney M.D.  Cardiac Electrophysiology  office [phone]  pager [phone]

## 2025-07-16 NOTE — PROGRESS NOTE ADULT - SUBJECTIVE AND OBJECTIVE BOX
DATE OF SERVICE: 07-16-25    Patient denies chest pain or shortness of breath.   Review of symptoms otherwise negative.    MEDICATIONS:  apixaban 5 milliGRAM(s) Oral two times a day  aspirin enteric coated 81 milliGRAM(s) Oral daily  chlorhexidine 2% Cloths 1 Application(s) Topical daily  furosemide    Tablet 40 milliGRAM(s) Oral daily  metoprolol tartrate 50 milliGRAM(s) Oral three times a day                            13.9   7.50  )-----------( 125      ( 16 Jul 2025 06:40 )             42.9       141  |  108[H]  |  18  ----------------------------<  94  4.0   |  22  |  1.02    Ca    8.6      16 Jul 2025 06:40  Phos  3.3     07-16  Mg     1.80     07-16    Creatinine Trend: 1.02<--, 1.18<--, 1.12<--, 1.30<--      T(C): 36.8 (07-16-25 @ 11:44), Max: 36.9 (07-15-25 @ 13:15)  HR: 57 (07-16-25 @ 11:44) (51 - 59)  BP: 133/87 (07-16-25 @ 11:44) (113/69 - 149/96)  RR: 18 (07-16-25 @ 11:44) (12 - 18)  SpO2: 97% (07-16-25 @ 11:44) (97% - 100%)  Wt(kg): --    Gen: Appears well in NAD  HEENT:  (-)icterus (-)pallor  CV: N S1 S2 1/6 SALLY (+)2 Pulses B/l  Resp:  Clear to auscultation B/L, normal effort  GI: (+) BS Soft, NT, ND  Lymph:  (-)Edema, (-)obvious lymphadenopathy  Skin: Warm to touch, Normal turgor  Psych: Appropriate mood and affect    TELEMETRY: 	Sinus vianey    ECG:  	SVT vs Aflutter    TTE 5/15/25 in office:  LVEF 50% moderate concentric remodelingof LV with grade I diastolic dysfxn  severe LAE    NST 3/1/2023  No ischemia or infarct, EF 40%    < from: TTE W or WO Ultrasound Enhancing Agent (07.14.25 @ 12:03) >    CONCLUSIONS:      1. Left ventricular cavity is normal in size. Left ventricular systolic function is moderately decreased with an ejection fraction of 39 % by Prado's method of disks. Global left ventricular hypokinesis.   2. Consider infiltrative cardiomyopathy. Severe left ventricular hypertrophy.   3. Normal right ventricular cavity size, with increased wall thickness, and reduced right ventricular systolic function.   4. Left atrium is mildly dilated.   5. No significant valvular disease.   6. No pericardial effusion seen.   7. Right pleural effusion noted.   8. The inferior vena cava is normal in size measuring 0.98 cm in diameter, (normal <2.1cm) with normal inspiratory collapse (normal >50%) consistent with normal right atrial pressure (~3, range 0-5mmHg).    < end of copied text >      < from: KIT W or WO Ultrasound Enhancing Agent (07.15.25 @ 12:27) >     CONCLUSIONS:      1. Left ventricular systolic function is mildly decreased. Global left ventricular hypokinesis.   2. Normal right ventricular cavity size and normal right ventricular systolic function.   3. Structurally normal mitral valve with normal leaflet excursion. There is trace mitral regurgitation.   4. The aortic valve appears trileaflet with normal systolic excursion. There is no evidence of aortic regurgitation.   5. No atheroma in the visualized portions of the proximal ascending aorta. No atheroma in the visualized portions of the transverse aortic arch. Mild non-mobile atheroma in the visualized portions of the descending aorta.   6. The left atrium is normal in size. There is no evidence of left atrial or left atrial appendage thrombus.   7. Agitated saline injection was negative for intracardiac shunt.    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  -- s/p KIT 7/15 as above, no JESSY thrombus  -- Already on lifelong AC with ELiquis given hx AFib and CVA  -- Not in clinical CHF, recent office echo as noted above  -- cont Metoprolol for NICM  -- hold Entresto for procedures  --plan for AF ablation    Magdalene WHITT  [phone]          DATE OF SERVICE: 07-16-25    Patient denies chest pain or shortness of breath.   Review of symptoms otherwise negative.    MEDICATIONS:  apixaban 5 milliGRAM(s) Oral two times a day  aspirin enteric coated 81 milliGRAM(s) Oral daily  chlorhexidine 2% Cloths 1 Application(s) Topical daily  furosemide    Tablet 40 milliGRAM(s) Oral daily  metoprolol tartrate 50 milliGRAM(s) Oral three times a day                            13.9   7.50  )-----------( 125      ( 16 Jul 2025 06:40 )             42.9       141  |  108[H]  |  18  ----------------------------<  94  4.0   |  22  |  1.02    Ca    8.6      16 Jul 2025 06:40  Phos  3.3     07-16  Mg     1.80     07-16    Creatinine Trend: 1.02<--, 1.18<--, 1.12<--, 1.30<--      T(C): 36.8 (07-16-25 @ 11:44), Max: 36.9 (07-15-25 @ 13:15)  HR: 57 (07-16-25 @ 11:44) (51 - 59)  BP: 133/87 (07-16-25 @ 11:44) (113/69 - 149/96)  RR: 18 (07-16-25 @ 11:44) (12 - 18)  SpO2: 97% (07-16-25 @ 11:44) (97% - 100%)  Wt(kg): --    Gen: Appears well in NAD  HEENT:  (-)icterus (-)pallor  CV: N S1 S2 1/6 SALLY (+)2 Pulses B/l  Resp:  Clear to auscultation B/L, normal effort  GI: (+) BS Soft, NT, ND  Lymph:  (-)Edema, (-)obvious lymphadenopathy  Skin: Warm to touch, Normal turgor  Psych: Appropriate mood and affect    TELEMETRY: 	Sinus vianey    ECG:  	SVT vs Aflutter    TTE 5/15/25 in office:  LVEF 50% moderate concentric remodelingof LV with grade I diastolic dysfxn  severe LAE    NST 3/1/2023  No ischemia or infarct, EF 40%    < from: TTE W or WO Ultrasound Enhancing Agent (07.14.25 @ 12:03) >    CONCLUSIONS:      1. Left ventricular cavity is normal in size. Left ventricular systolic function is moderately decreased with an ejection fraction of 39 % by Prado's method of disks. Global left ventricular hypokinesis.   2. Consider infiltrative cardiomyopathy. Severe left ventricular hypertrophy.   3. Normal right ventricular cavity size, with increased wall thickness, and reduced right ventricular systolic function.   4. Left atrium is mildly dilated.   5. No significant valvular disease.   6. No pericardial effusion seen.   7. Right pleural effusion noted.   8. The inferior vena cava is normal in size measuring 0.98 cm in diameter, (normal <2.1cm) with normal inspiratory collapse (normal >50%) consistent with normal right atrial pressure (~3, range 0-5mmHg).    < end of copied text >      < from: KIT W or WO Ultrasound Enhancing Agent (07.15.25 @ 12:27) >     CONCLUSIONS:      1. Left ventricular systolic function is mildly decreased. Global left ventricular hypokinesis.   2. Normal right ventricular cavity size and normal right ventricular systolic function.   3. Structurally normal mitral valve with normal leaflet excursion. There is trace mitral regurgitation.   4. The aortic valve appears trileaflet with normal systolic excursion. There is no evidence of aortic regurgitation.   5. No atheroma in the visualized portions of the proximal ascending aorta. No atheroma in the visualized portions of the transverse aortic arch. Mild non-mobile atheroma in the visualized portions of the descending aorta.   6. The left atrium is normal in size. There is no evidence of left atrial or left atrial appendage thrombus.   7. Agitated saline injection was negative for intracardiac shunt.    < end of copied text >      ASSESSMENT/PLAN: Patient is a 45F with a PMH of HTN, NICM s/p [facility] 2020 with non obs CAD, CVA in 2023, residual R sided weakness and speech deficit, PAF on Eliquis and HLD admitted with tachycardia, SVT vs Atrial Flutter.  -- s/p KIT 7/15 as above, no JESSY thrombus  -- Already on lifelong AC with ELiquis given hx AFib and CVA  -- Not in clinical CHF, recent office echo as noted above  -- cont Metoprolol for NICM  -- hold Entresto for procedures  --plan for AF ablation today    after D/c F/U Dr Cintron (EP) 8/4/25 at 11:40AM  and Dr Phillips (Cardiologist) 7/31 at 945 AM in [facility], [phone]    Magdalene WHITT  [phone]          DATE OF SERVICE: 07-16-25    Patient denies chest pain or shortness of breath.   Review of symptoms otherwise negative.    MEDICATIONS:  apixaban 5 milliGRAM(s) Oral two times a day  aspirin enteric coated 81 milliGRAM(s) Oral daily  chlorhexidine 2% Cloths 1 Application(s) Topical daily  furosemide    Tablet 40 milliGRAM(s) Oral daily  metoprolol tartrate 50 milliGRAM(s) Oral three times a day                            13.9   7.50  )-----------( 125      ( 16 Jul 2025 06:40 )             42.9       141  |  108[H]  |  18  ----------------------------<  94  4.0   |  22  |  1.02    Ca    8.6      16 Jul 2025 06:40  Phos  3.3     07-16  Mg     1.80     07-16    Creatinine Trend: 1.02<--, 1.18<--, 1.12<--, 1.30<--      T(C): 36.8 (07-16-25 @ 11:44), Max: 36.9 (07-15-25 @ 13:15)  HR: 57 (07-16-25 @ 11:44) (51 - 59)  BP: 133/87 (07-16-25 @ 11:44) (113/69 - 149/96)  RR: 18 (07-16-25 @ 11:44) (12 - 18)  SpO2: 97% (07-16-25 @ 11:44) (97% - 100%)  Wt(kg): --    Gen: Appears well in NAD  HEENT:  (-)icterus (-)pallor  CV: N S1 S2 1/6 SALLY (+)2 Pulses B/l  Resp:  Clear to auscultation B/L, normal effort  GI: (+) BS Soft, NT, ND  Lymph:  (-)Edema, (-)obvious lymphadenopathy  Skin: Warm to touch, Normal turgor  Psych: Appropriate mood and affect    TELEMETRY: 	Sinus vianey    ECG:  	SVT vs Aflutter    TTE 5/15/25 in office:  LVEF 50% moderate concentric remodelingof LV with grade I diastolic dysfxn  severe LAE    NST 3/1/2023  No ischemia or infarct, EF 40%    < from: TTE W or WO Ultrasound Enhancing Agent (07.14.25 @ 12:03) >    CONCLUSIONS:      1. Left ventricular cavity is normal in size. Left ventricular systolic function is moderately decreased with an ejection fraction of 39 % by Prado's method of disks. Global left ventricular hypokinesis.   2. Consider infiltrative cardiomyopathy. Severe left ventricular hypertrophy.   3. Normal right ventricular cavity size, with increased wall thickness, and reduced right ventricular systolic function.   4. Left atrium is mildly dilated.   5. No significant valvular disease.   6. No pericardial effusion seen.   7. Right pleural effusion noted.   8. The inferior vena cava is normal in size measuring 0.98 cm in diameter, (normal <2.1cm) with normal inspiratory collapse (normal >50%) consistent with normal right atrial pressure (~3, range 0-5mmHg).    < end of copied text >      < from: KIT W or WO Ultrasound Enhancing Agent (07.15.25 @ 12:27) >     CONCLUSIONS:      1. Left ventricular systolic function is mildly decreased. Global left ventricular hypokinesis.   2. Normal right ventricular cavity size and normal right ventricular systolic function.   3. Structurally normal mitral valve with normal leaflet excursion. There is trace mitral regurgitation.   4. The aortic valve appears trileaflet with normal systolic excursion. There is no evidence of aortic regurgitation.   5. No atheroma in the visualized portions of the proximal ascending aorta. No atheroma in the visualized portions of the transverse aortic arch. Mild non-mobile atheroma in the visualized portions of the descending aorta.   6. The left atrium is normal in size. There is no evidence of left atrial or left atrial appendage thrombus.   7. Agitated saline injection was negative for intracardiac shunt.    < end of copied text >      ASSESSMENT/PLAN: Patient is a 45F with a PMH of HTN, NICM s/p [facility] 2020 with non obs CAD, CVA in 2023, residual R sided weakness and speech deficit, PAF on Eliquis and HLD admitted with tachycardia, SVT vs Atrial Flutter.  -- s/p KIT 7/15 as above, no JESSY thrombus  -- Already on lifelong AC with ELiquis given hx AFib and CVA  -- Not in clinical CHF, recent office echo as noted above  -- cont Metoprolol for NICM  -- hold Entresto for procedures  -- plan for AF ablation today    after D/c F/U Dr Cintron (EP) 8/4/25 at 11:40AM  and Dr Phillips (Cardiologist) 7/31 at 945 AM in [facility], [phone]    Magdalene WHITT  [phone]

## 2025-07-16 NOTE — PROGRESS NOTE ADULT - SUBJECTIVE AND OBJECTIVE BOX
[facility] Division of Hospital Medicine  Kolton Monroy MD  In House Pager [phone]    Patient is a 45y old  Male who presents with a chief complaint of SVT vs A Flutter (14 Jul 2025 12:57)    INTERVAL EVENTS: no acute events.   SUBJECTIVE: no new subjective symptoms. going to ablation today.   ROS: Denied Fever, Chill, CP, SOB, Abd pain, N/V/D, LE swelling or pain.     MEDICATIONS  (STANDING):  apixaban 5 milliGRAM(s) Oral two times a day  aspirin enteric coated 81 milliGRAM(s) Oral daily  chlorhexidine 2% Cloths 1 Application(s) Topical daily  furosemide    Tablet 40 milliGRAM(s) Oral daily  metoprolol tartrate 50 milliGRAM(s) Oral three times a day    MEDICATIONS  (PRN):    CAPILLARY BLOOD GLUCOSE        I&O's Summary      Vital Signs Last 24 Hrs  T(C): 36.8 (16 Jul 2025 11:44), Max: 36.9 (15 Jul 2025 13:15)  T(F): 98.3 (16 Jul 2025 11:44), Max: 98.5 (15 Jul 2025 13:15)  HR: 57 (16 Jul 2025 11:44) (51 - 59)  BP: 133/87 (16 Jul 2025 11:44) (113/69 - 149/96)  BP(mean): --  RR: 18 (16 Jul 2025 11:44) (12 - 18)  SpO2: 97% (16 Jul 2025 11:44) (97% - 100%)    Parameters below as of 16 Jul 2025 11:44  Patient On (Oxygen Delivery Method): room air        LABS:                        13.9   7.50  )-----------( 125      ( 16 Jul 2025 06:40 )             42.9     07-16    141  |  108[H]  |  18  ----------------------------<  94  4.0   |  22  |  1.02    Ca    8.6      16 Jul 2025 06:40  Phos  3.3     07-16  Mg     1.80     07-16            Urinalysis Basic - ( 16 Jul 2025 06:40 )    Color: x / Appearance: x / SG: x / pH: x  Gluc: 94 mg/dL / Ketone: x  / Bili: x / Urobili: x   Blood: x / Protein: x / Nitrite: x   Leuk Esterase: x / RBC: x / WBC x   Sq Epi: x / Non Sq Epi: x / Bacteria: x        RADIOLOGY & ADDITIONAL TESTS:  Results Reviewed: Y  Imaging Personally Reviewed: Y  Electrocardiogram Personally Reviewed: Y    COORDINATION OF CARE:  Care Discussed with Consultants/Other Providers [Y/N]: Y  Prior or Outpatient Records Reviewed [Y/N]: Y

## 2025-07-17 LAB
ANION GAP SERPL CALC-SCNC: 14 MMOL/L — SIGNIFICANT CHANGE UP (ref 7–14)
BLD GP AB SCN SERPL QL: NEGATIVE — SIGNIFICANT CHANGE UP
BUN SERPL-MCNC: 18 MG/DL — SIGNIFICANT CHANGE UP (ref 7–23)
CALCIUM SERPL-MCNC: 8.9 MG/DL — SIGNIFICANT CHANGE UP (ref 8.4–10.5)
CHLORIDE SERPL-SCNC: 104 MMOL/L — SIGNIFICANT CHANGE UP (ref 98–107)
CO2 SERPL-SCNC: 21 MMOL/L — LOW (ref 22–31)
CREAT SERPL-MCNC: 1.03 MG/DL — SIGNIFICANT CHANGE UP (ref 0.5–1.3)
EGFR: 91 ML/MIN/1.73M2 — SIGNIFICANT CHANGE UP
EGFR: 91 ML/MIN/1.73M2 — SIGNIFICANT CHANGE UP
GLUCOSE SERPL-MCNC: 98 MG/DL — SIGNIFICANT CHANGE UP (ref 70–99)
HCT VFR BLD CALC: 41.5 % — SIGNIFICANT CHANGE UP (ref 39–50)
HGB BLD-MCNC: 13.4 G/DL — SIGNIFICANT CHANGE UP (ref 13–17)
MAGNESIUM SERPL-MCNC: 1.9 MG/DL — SIGNIFICANT CHANGE UP (ref 1.6–2.6)
MCHC RBC-ENTMCNC: 24.7 PG — LOW (ref 27–34)
MCHC RBC-ENTMCNC: 32.3 G/DL — SIGNIFICANT CHANGE UP (ref 32–36)
MCV RBC AUTO: 76.4 FL — LOW (ref 80–100)
NRBC # BLD AUTO: 0 K/UL — SIGNIFICANT CHANGE UP (ref 0–0)
NRBC # FLD: 0 K/UL — SIGNIFICANT CHANGE UP (ref 0–0)
NRBC BLD AUTO-RTO: 0 /100 WBCS — SIGNIFICANT CHANGE UP (ref 0–0)
PHOSPHATE SERPL-MCNC: 3.4 MG/DL — SIGNIFICANT CHANGE UP (ref 2.5–4.5)
PLATELET # BLD AUTO: 128 K/UL — LOW (ref 150–400)
PMV BLD: 12.4 FL — SIGNIFICANT CHANGE UP (ref 7–13)
POTASSIUM SERPL-MCNC: 4.1 MMOL/L — SIGNIFICANT CHANGE UP (ref 3.5–5.3)
POTASSIUM SERPL-SCNC: 4.1 MMOL/L — SIGNIFICANT CHANGE UP (ref 3.5–5.3)
RBC # BLD: 5.43 M/UL — SIGNIFICANT CHANGE UP (ref 4.2–5.8)
RBC # FLD: 15 % — HIGH (ref 10.3–14.5)
RH IG SCN BLD-IMP: POSITIVE — SIGNIFICANT CHANGE UP
SODIUM SERPL-SCNC: 139 MMOL/L — SIGNIFICANT CHANGE UP (ref 135–145)
WBC # BLD: 7.57 K/UL — SIGNIFICANT CHANGE UP (ref 3.8–10.5)
WBC # FLD AUTO: 7.57 K/UL — SIGNIFICANT CHANGE UP (ref 3.8–10.5)

## 2025-07-17 PROCEDURE — 99232 SBSQ HOSP IP/OBS MODERATE 35: CPT

## 2025-07-17 RX ORDER — METOPROLOL SUCCINATE 50 MG/1
50 TABLET, EXTENDED RELEASE ORAL DAILY
Refills: 0 | Status: DISCONTINUED | OUTPATIENT
Start: 2025-07-17 | End: 2025-07-18

## 2025-07-17 RX ORDER — METOPROLOL SUCCINATE 50 MG/1
50 TABLET, EXTENDED RELEASE ORAL THREE TIMES A DAY
Refills: 0 | Status: DISCONTINUED | OUTPATIENT
Start: 2025-07-17 | End: 2025-07-17

## 2025-07-17 RX ADMIN — METOPROLOL SUCCINATE 50 MILLIGRAM(S): 50 TABLET, EXTENDED RELEASE ORAL at 13:18

## 2025-07-17 RX ADMIN — FUROSEMIDE 40 MILLIGRAM(S): 10 INJECTION INTRAMUSCULAR; INTRAVENOUS at 05:54

## 2025-07-17 RX ADMIN — Medication 81 MILLIGRAM(S): at 13:19

## 2025-07-17 RX ADMIN — APIXABAN 5 MILLIGRAM(S): 5 TABLET, FILM COATED ORAL at 17:25

## 2025-07-17 RX ADMIN — APIXABAN 5 MILLIGRAM(S): 5 TABLET, FILM COATED ORAL at 05:53

## 2025-07-17 RX ADMIN — Medication 1 APPLICATION(S): at 13:19

## 2025-07-17 NOTE — PROGRESS NOTE ADULT - ASSESSMENT
44 y/o M with pmh of CVA in 2023, pAFib on Eliquis, CAD, HTN, HLD  presented to the ED for tachycardia, and found to have SVT.
46 y/o M with pmh of CVA in 2023, pAFib on Eliquis, CAD, NICM, HTN, HLD  presented to the ED for tachycardia, and found to have SVT.
46 y/o M with pmh of CVA in 2023, pAFib on Eliquis, CAD, NICM, HTN, HLD  presented to the ED for tachycardia, and found to have SVT.

## 2025-07-17 NOTE — PROVIDER CONTACT NOTE (MEDICATION) - BACKGROUND
(Admit Diagnosis) Supraventricular tachycardia  (PMH) History of CVA in adulthood  (PMH) Paroxysmal atrial fibrillation

## 2025-07-17 NOTE — PROGRESS NOTE ADULT - SUBJECTIVE AND OBJECTIVE BOX
[facility] Division of Hospital Medicine  Kolton Monroy MD  In House Pager [phone]    Patient is a 45y old  Male who presents with a chief complaint of SVT (16 Jul 2025 12:30)    INTERVAL EVENTS: ablation was cancelled and rescheduled for 7/18.   SUBJECTIVE: no new subjective symptoms.   ROS: Denied Fever, Chill, CP, SOB, Abd pain, N/V/D, LE swelling or pain.     MEDICATIONS  (STANDING):  apixaban 5 milliGRAM(s) Oral two times a day  aspirin enteric coated 81 milliGRAM(s) Oral daily  chlorhexidine 2% Cloths 1 Application(s) Topical daily  furosemide    Tablet 40 milliGRAM(s) Oral daily  metoprolol tartrate 50 milliGRAM(s) Oral three times a day    MEDICATIONS  (PRN):    CAPILLARY BLOOD GLUCOSE        I&O's Summary      Vital Signs Last 24 Hrs  T(C): 36.6 (17 Jul 2025 13:00), Max: 36.7 (16 Jul 2025 20:07)  T(F): 97.8 (17 Jul 2025 13:00), Max: 98 (16 Jul 2025 20:07)  HR: 62 (17 Jul 2025 13:00) (50 - 62)  BP: 153/90 (17 Jul 2025 13:00) (127/74 - 153/90)  BP(mean): --  RR: 16 (17 Jul 2025 13:00) (16 - 18)  SpO2: 100% (17 Jul 2025 13:00) (97% - 100%)    Parameters below as of 17 Jul 2025 13:00  Patient On (Oxygen Delivery Method): room air        LABS:                        13.4   7.57  )-----------( 128      ( 17 Jul 2025 06:00 )             41.5     07-17    139  |  104  |  18  ----------------------------<  98  4.1   |  21[L]  |  1.03    Ca    8.9      17 Jul 2025 06:00  Phos  3.4     07-17  Mg     1.90     07-17            Urinalysis Basic - ( 17 Jul 2025 06:00 )    Color: x / Appearance: x / SG: x / pH: x  Gluc: 98 mg/dL / Ketone: x  / Bili: x / Urobili: x   Blood: x / Protein: x / Nitrite: x   Leuk Esterase: x / RBC: x / WBC x   Sq Epi: x / Non Sq Epi: x / Bacteria: x        RADIOLOGY & ADDITIONAL TESTS:  Results Reviewed: Y  Imaging Personally Reviewed: Y  Electrocardiogram Personally Reviewed: Y    COORDINATION OF CARE:  Care Discussed with Consultants/Other Providers [Y/N]: Y  Prior or Outpatient Records Reviewed [Y/N]: Y

## 2025-07-17 NOTE — PROGRESS NOTE ADULT - SUBJECTIVE AND OBJECTIVE BOX
DATE OF SERVICE: 07-17-25    Patient denies chest pain or shortness of breath.   Review of symptoms otherwise negative.    MEDICATIONS:  apixaban 5 milliGRAM(s) Oral two times a day  aspirin enteric coated 81 milliGRAM(s) Oral daily  chlorhexidine 2% Cloths 1 Application(s) Topical daily  furosemide    Tablet 40 milliGRAM(s) Oral daily  metoprolol tartrate 50 milliGRAM(s) Oral three times a day      LABS:                        13.4   7.57  )-----------( 128      ( 17 Jul 2025 06:00 )             41.5       Hemoglobin: 13.4 g/dL (07-17 @ 06:00)  Hemoglobin: 13.9 g/dL (07-16 @ 06:40)  Hemoglobin: 14.5 g/dL (07-15 @ 06:20)  Hemoglobin: 15.8 g/dL (07-14 @ 08:38)  Hemoglobin: 15.7 g/dL (07-13 @ 11:20)      07-17    139  |  104  |  18  ----------------------------<  98  4.1   |  21[L]  |  1.03    Ca    8.9      17 Jul 2025 06:00  Phos  3.4     07-17  Mg     1.90     07-17      Creatinine Trend: 1.03<--, 1.02<--, 1.18<--, 1.12<--, 1.30<--    COAGS:           PHYSICAL EXAM:  T(C): 36.7 (07-17-25 @ 05:54), Max: 36.7 (07-16-25 @ 20:07)  HR: 50 (07-17-25 @ 07:33) (50 - 59)  BP: 146/85 (07-17-25 @ 05:54) (127/74 - 146/85)  RR: 18 (07-17-25 @ 05:54) (17 - 18)  SpO2: 100% (07-17-25 @ 05:54) (97% - 100%)  Wt(kg): --    I&O's Summary            Gen: Appears well in NAD  HEENT:  (-)icterus (-)pallor  CV: N S1 S2 1/6 SALLY (+)2 Pulses B/l  Resp:  Clear to auscultation B/L, normal effort  GI: (+) BS Soft, NT, ND  Lymph:  (-)Edema, (-)obvious lymphadenopathy  Skin: Warm to touch, Normal turgor  Psych: Appropriate mood and affect    TELEMETRY: 	Sinus vianey    ECG:  	SVT vs Aflutter    TTE 5/15/25 in office:  LVEF 50% moderate concentric remodelingof LV with grade I diastolic dysfxn  severe LAE    NST 3/1/2023  No ischemia or infarct, EF 40%    < from: TTE W or WO Ultrasound Enhancing Agent (07.14.25 @ 12:03) >    CONCLUSIONS:      1. Left ventricular cavity is normal in size. Left ventricular systolic function is moderately decreased with an ejection fraction of 39 % by Prado's method of disks. Global left ventricular hypokinesis.   2. Consider infiltrative cardiomyopathy. Severe left ventricular hypertrophy.   3. Normal right ventricular cavity size, with increased wall thickness, and reduced right ventricular systolic function.   4. Left atrium is mildly dilated.   5. No significant valvular disease.   6. No pericardial effusion seen.   7. Right pleural effusion noted.   8. The inferior vena cava is normal in size measuring 0.98 cm in diameter, (normal <2.1cm) with normal inspiratory collapse (normal >50%) consistent with normal right atrial pressure (~3, range 0-5mmHg).    < end of copied text >      < from: KIT W or WO Ultrasound Enhancing Agent (07.15.25 @ 12:27) >     CONCLUSIONS:      1. Left ventricular systolic function is mildly decreased. Global left ventricular hypokinesis.   2. Normal right ventricular cavity size and normal right ventricular systolic function.   3. Structurally normal mitral valve with normal leaflet excursion. There is trace mitral regurgitation.   4. The aortic valve appears trileaflet with normal systolic excursion. There is no evidence of aortic regurgitation.   5. No atheroma in the visualized portions of the proximal ascending aorta. No atheroma in the visualized portions of the transverse aortic arch. Mild non-mobile atheroma in the visualized portions of the descending aorta.   6. The left atrium is normal in size. There is no evidence of left atrial or left atrial appendage thrombus.   7. Agitated saline injection was negative for intracardiac shunt.    < end of copied text >      ASSESSMENT/PLAN: Patient is a 45F with a PMH of HTN, NICM s/p [facility] 2020 with non obs CAD, CVA in 2023, residual R sided weakness and speech deficit, PAF on Eliquis and HLD admitted with tachycardia, SVT vs Atrial Flutter.  -- s/p KIT 7/15 as above, no JESSY thrombus  -- Already on lifelong AC with ELiquis given hx AFib and CVA  -- Not in clinical CHF, recent office echo as noted above  -- cont Metoprolol for NICM  -- hold Entresto for procedures  -- plan for AF ablation tomorrow    after D/c F/U Dr Cintron (EP) 8/4/25 at 11:40AM  and Dr Phillips (Cardiologist) 7/31 at 945 AM in [place] office, [phone]    Helio Atkinson MD  Pager: [phone]  Office: [phone]

## 2025-07-17 NOTE — PROGRESS NOTE ADULT - PROBLEM SELECTOR PLAN 1
- Reviewed cardiology and EP consults  - plan is for ablation tomorrow  - Continue metoprolol 50mg TID  - continue Eliquis  - holding Entresto prior to procedure as per EP
- Reviewed cardiology and EP consults  - plan is for ablation today  - Continue metoprolol 50mg TID  - continue Eliquis  - restart Entresto after EP procedure.
- Reviewed cardiology and EP consults  - Continue metoprolol 50mg TID  - continue Eliquis  - hold entresto.   - planning for ablation on 7/18.

## 2025-07-17 NOTE — PROGRESS NOTE ADULT - CARDIOVASCULAR
normal/regular rate and rhythm/S1 S2 present/no gallops/no rub/no murmur/no JVD/no pedal edema
normal/regular rate and rhythm/S1 S2 present/no gallops/no rub/no murmur/no JVD/no pedal edema

## 2025-07-17 NOTE — PROGRESS NOTE ADULT - SUBJECTIVE AND OBJECTIVE BOX
EP ATTENDING    HISTORY OF PRESENT ILLNESS: HPI:  Patient is a 45F with a PMH of CVA in 2023, pAFib on eliquis, CAD, HTN, HLD who presents to the ED for tachycardia.  Patient states that he was sitting on his bed yesterday evening when he got an alert from his watch.  Watch reported that his HR was elevated >120 for ten minutes.  Patient states that he was asymptomatic, denies history of CP, palpitations, dyspnea, dizziness, lightheadedness.  Notes his HR improved slightly before going to sleep.  When patient woke up today, he noted that his HR was still around 120.  Presented to the ED for evaluation.  While in ED, patient noted to have SVT as high as 149 - resolved with vagal manuevers at the bedside.  Patient states that he has remained asymptomatic since onset of tachycardia.  Nonsmoker, social drinker, denies recreational drugs.  Will admit to tele (13 Jul 2025 17:41)    Mr Vuong is a very pleasant 45yoM, self-referred to ED from home for high heartrate identified on AppleWatch and pulse-ox.  Sees our colleague Dr Lyons for general cardiology.  ~6 years ago, patient had a diagnosis of congestive heart failure, LVEF was in the 40s.  LVEF improved on Coreg + Entresto.  He's lost ~100 lbs with diet and exercise (no drugs, no surgeries).  He may have had sleep apnea earlier on, but more recent sleep studies definitively diagnosed NO sleep disordered breathing.  First had AFib diagnosed 2.5yrs ago.  At that point, [id number] score was 2 for hypertension and CHF, but given his young age and good control of his health, no anticoagulation was prescribed.  He then had a stroke (2024?), and [id number] score was 4. He was then prescribed Apixaban 5mg BID, and has been on this medication ever since.  Hes had a few documented episodes of paroxysmal AFib, most converting spontaneously within a few hours. This episode is lasting 2-3 days at this time.  He is not experiencing chest pain, shortness of breath on exertion, palpitations, lightheadedness, or any other identifiable symptoms, but is kept aware due to his AppleWatch alerting him for persistently high heartrates.  A 10 pt ROS is otherwise negative.  7/15- converted to sinus spontaneously overnight.  in AFib, echo with reduced LV systolic function, and notably severe LV hypertrophy.    Awaiting pre-op KIT today, then ablation tomorrow.    7/16- patient's ablation case had to be postponed to a later date.  feeling well, and will return to his room, eat, and await ablation on FRIDAY now.  Date of service 7/17- patient consented for tomorrow's AFib ablation. no overnight issues.    PAST MEDICAL & SURGICAL HISTORY:  Congestive heart failure  HTN (hypertension)  HLD (hyperlipidemia)  Paroxysmal atrial fibrillation  History of CVA in adulthood  No significant past surgical history    apixaban 5 milliGRAM(s) Oral two times a day  aspirin enteric coated 81 milliGRAM(s) Oral daily  chlorhexidine 2% Cloths 1 Application(s) Topical daily  furosemide    Tablet 40 milliGRAM(s) Oral daily  metoprolol tartrate 50 milliGRAM(s) Oral three times a day                            13.4   7.57  )-----------( 128      ( 17 Jul 2025 06:00 )             41.5       07-17    139  |  104  |  18  ----------------------------<  98  4.1   |  21[L]  |  1.03    Ca    8.9      17 Jul 2025 06:00  Phos  3.4     07-17  Mg     1.90     07-17      T(C): 36.6 (07-17-25 @ 13:00), Max: 36.7 (07-16-25 @ 20:07)  HR: 62 (07-17-25 @ 13:00) (50 - 62)  BP: 153/90 (07-17-25 @ 13:00) (127/74 - 153/90)  RR: 16 (07-17-25 @ 13:00) (16 - 18)  SpO2: 100% (07-17-25 @ 13:00) (97% - 100%)  Wt(kg): --    I&O's Summary    General: Well nourished, no acute distress, alert and oriented x 3  Head: normocephalic, no trauma  Neck: no JVD, no bruit, supple, not enlarged  CV: rapid irregular S1S2, no murmurs.    Lungs: clear BL, no rales or wheezes  Abdomen: bowel sounds +, soft, nontender, nondistended  Extremities: no clubbing, cyanosis or edema  Neuro: Moves all 4 extremities, sensation intact x 4 extremities  Skin: warm and moist, normal turgor  Psych: Mood and affect are appropriate for circumstances  MSK: normal range of motion and strength x4 extremities.      TELEMETRY: ATach with variable AV conduction. Some episodes of AFib.  --> converted to sinus.	    ECG: AT (+V1, +II,III).  SVT (likely 2:1 AT) at 143bpm. 	  Echo:    Office, 5/2025  LVEF 50% LVIDd <5cm (no longer dilated), LA 5.4cm in anterior posterior dimension.  Mild LVH.    < from: TTE W or WO Ultrasound Enhancing Agent (07.14.25 @ 12:03) >  CONCLUSIONS:      1. Left ventricular cavity is normal in size. Left ventricular systolic function is moderately decreased with an ejection fraction of 39 % by Prado's method of disks. Global left ventricular hypokinesis.   2. Consider infiltrative cardiomyopathy. Severe left ventricular hypertrophy.   3. Normal right ventricular cavity size, with increased wall thickness, and reduced right ventricular systolic function.   4. Left atrium is mildly dilated.   5. No significant valvular disease.   6. No pericardial effusion seen.   7. Right pleural effusion noted.   8. The inferior vena cava is normal in size measuring 0.98 cm in diameter, (normal <2.1cm) with normal inspiratory collapse (normal >50%) consistent with normal right atrial pressure (~3, range 0-5mmHg).    < end of copied text >      < from: KIT W or WO Ultrasound Enhancing Agent (07.15.25 @ 12:27) >     CONCLUSIONS:      1. Left ventricular systolic function is mildly decreased. Global left ventricular hypokinesis.   2. Normal right ventricular cavity size and normal right ventricular systolic function.   3. Structurally normal mitral valve with normal leaflet excursion. There is trace mitral regurgitation.   4. The aortic valve appears trileaflet with normal systolic excursion. There is no evidence of aortic regurgitation.   5. No atheroma in the visualized portions of the proximal ascending aorta. No atheroma in the visualized portions of the transverse aortic arch. Mild non-mobile atheroma in the visualized portions of the descending aorta.   6. The left atrium is normal in size. There is no evidence of left atrial or left atrial appendage thrombus.   7. Agitated saline injection was negative for intracardiac shunt.    < end of copied text >      ASSESSMENT/PLAN: Mr Vuong is a pleasant 45y Male here with persistently elevated heartrate at home.  Paroxysmal AFib (spontaneously converted to sinus after ~72hrs, longest episode yet).  Has heart failure with recovered LVEF% on good GDMT with beta blockers and entresto.  Nonischemic cardiomyopathy, NYHA II, likely due to prior obesity and hypertension.  Has lost 100lbs+ with diet and exercise.  LVEF during AFib down from 50 --> 39%.    KIT reassuring. No thrombus.  Will back off the Metoprolol dose.  HR 50s-60s.  Entresto on hold.  Can use lower dose ARB if needed between now and Friday.  Continue apixaban 5mg BID for secondary prevention of stroke.  Ablation moved to Friday AM.  Plan for home Friday PM if feeling well.  Outpatient elective Cardiac MRI re: severe LV hypertrophy, workup for infiltrative cardiomyopathy.    Og Mahoney M.D.  Cardiac Electrophysiology  office [phone]  pager [phone]

## 2025-07-17 NOTE — PROGRESS NOTE ADULT - GASTROINTESTINAL
normal/soft/nontender/nondistended/normal active bowel sounds/no guarding
normal/soft/nontender/nondistended/normal active bowel sounds/no guarding

## 2025-07-17 NOTE — PROGRESS NOTE ADULT - PROBLEM SELECTOR PLAN 4
echo showed stable LV function.   clinically w/o signs of ADHF.  holding Entresto per Cards and EP, restart after ablation.   c/w daily furosemide.
- holding Entresto  - continue metoprolol
echo showed stable LV function.   clinically w/o signs of ADHF.  holding Entresto today per Cards and EP, restart in AM.   c/w daily furosemide.

## 2025-07-17 NOTE — PROVIDER CONTACT NOTE (MEDICATION) - SITUATION
Patient is a 45M with a PMH of CVA in 2023, pAFib on eliquis, CAD, HTN, HLD who presents to the ED for tachycardia

## 2025-07-17 NOTE — PROGRESS NOTE ADULT - TIME BILLING
Review of inpatient records, review of results of current studies, taking history from patient, evaluation with exam, review of consultant notes, entering appropriate orders, and documentation.
- Ordering, reviewing, and interpreting labs, testing, and imaging.  - Independently obtaining a review of systems and performing a physical exam  - Reviewing prior hospitalization and where necessary, outpatient records.  - Reviewing consultant recommendations/communicating with consultants  - Counselling and educating patient and family regarding interpretation of aforementioned items and plan of care.
- Ordering, reviewing, and interpreting labs, testing, and imaging.  - Independently obtaining a review of systems and performing a physical exam  - Reviewing prior hospitalization and where necessary, outpatient records.  - Reviewing consultant recommendations/communicating with consultants  - Counselling and educating patient and family regarding interpretation of aforementioned items and plan of care.

## 2025-07-18 ENCOUNTER — TRANSCRIPTION ENCOUNTER (OUTPATIENT)
Age: 45
End: 2025-07-18

## 2025-07-18 VITALS
HEART RATE: 52 BPM | DIASTOLIC BLOOD PRESSURE: 66 MMHG | OXYGEN SATURATION: 96 % | SYSTOLIC BLOOD PRESSURE: 131 MMHG | RESPIRATION RATE: 16 BRPM

## 2025-07-18 LAB
ANION GAP SERPL CALC-SCNC: 8 MMOL/L — SIGNIFICANT CHANGE UP (ref 7–14)
BUN SERPL-MCNC: 16 MG/DL — SIGNIFICANT CHANGE UP (ref 7–23)
CALCIUM SERPL-MCNC: 8.7 MG/DL — SIGNIFICANT CHANGE UP (ref 8.4–10.5)
CHLORIDE SERPL-SCNC: 105 MMOL/L — SIGNIFICANT CHANGE UP (ref 98–107)
CO2 SERPL-SCNC: 23 MMOL/L — SIGNIFICANT CHANGE UP (ref 22–31)
CREAT SERPL-MCNC: 0.97 MG/DL — SIGNIFICANT CHANGE UP (ref 0.5–1.3)
EGFR: 98 ML/MIN/1.73M2 — SIGNIFICANT CHANGE UP
EGFR: 98 ML/MIN/1.73M2 — SIGNIFICANT CHANGE UP
GLUCOSE SERPL-MCNC: 91 MG/DL — SIGNIFICANT CHANGE UP (ref 70–99)
HCT VFR BLD CALC: 40.8 % — SIGNIFICANT CHANGE UP (ref 39–50)
HGB BLD-MCNC: 13 G/DL — SIGNIFICANT CHANGE UP (ref 13–17)
MAGNESIUM SERPL-MCNC: 1.8 MG/DL — SIGNIFICANT CHANGE UP (ref 1.6–2.6)
MCHC RBC-ENTMCNC: 24.7 PG — LOW (ref 27–34)
MCHC RBC-ENTMCNC: 31.9 G/DL — LOW (ref 32–36)
MCV RBC AUTO: 77.6 FL — LOW (ref 80–100)
NRBC # BLD AUTO: 0 K/UL — SIGNIFICANT CHANGE UP (ref 0–0)
NRBC # FLD: 0 K/UL — SIGNIFICANT CHANGE UP (ref 0–0)
NRBC BLD AUTO-RTO: 0 /100 WBCS — SIGNIFICANT CHANGE UP (ref 0–0)
PHOSPHATE SERPL-MCNC: 2.8 MG/DL — SIGNIFICANT CHANGE UP (ref 2.5–4.5)
PLATELET # BLD AUTO: 110 K/UL — LOW (ref 150–400)
PMV BLD: 12.1 FL — SIGNIFICANT CHANGE UP (ref 7–13)
POTASSIUM SERPL-MCNC: 3.8 MMOL/L — SIGNIFICANT CHANGE UP (ref 3.5–5.3)
POTASSIUM SERPL-SCNC: 3.8 MMOL/L — SIGNIFICANT CHANGE UP (ref 3.5–5.3)
RBC # BLD: 5.26 M/UL — SIGNIFICANT CHANGE UP (ref 4.2–5.8)
RBC # FLD: 14.6 % — HIGH (ref 10.3–14.5)
SODIUM SERPL-SCNC: 136 MMOL/L — SIGNIFICANT CHANGE UP (ref 135–145)
WBC # BLD: 7.08 K/UL — SIGNIFICANT CHANGE UP (ref 3.8–10.5)
WBC # FLD AUTO: 7.08 K/UL — SIGNIFICANT CHANGE UP (ref 3.8–10.5)

## 2025-07-18 PROCEDURE — 93010 ELECTROCARDIOGRAM REPORT: CPT

## 2025-07-18 PROCEDURE — 99239 HOSP IP/OBS DSCHRG MGMT >30: CPT

## 2025-07-18 RX ORDER — SACUBITRIL AND VALSARTAN 6; 6 MG/1; MG/1
1 PELLET ORAL
Qty: 60 | Refills: 0
Start: 2025-07-18 | End: 2025-08-16

## 2025-07-18 RX ORDER — CARVEDILOL 3.12 MG/1
1 TABLET, FILM COATED ORAL
Qty: 60 | Refills: 0
Start: 2025-07-18 | End: 2025-08-16

## 2025-07-18 RX ORDER — CARVEDILOL 3.12 MG/1
6.25 TABLET, FILM COATED ORAL EVERY 12 HOURS
Refills: 0 | Status: DISCONTINUED | OUTPATIENT
Start: 2025-07-19 | End: 2025-07-18

## 2025-07-18 RX ORDER — ASPIRIN 325 MG
1 TABLET ORAL
Qty: 30 | Refills: 0
Start: 2025-07-18 | End: 2025-08-16

## 2025-07-18 RX ORDER — APIXABAN 5 MG/1
1 TABLET, FILM COATED ORAL
Refills: 0 | DISCHARGE

## 2025-07-18 RX ORDER — FUROSEMIDE 10 MG/ML
1 INJECTION INTRAMUSCULAR; INTRAVENOUS
Qty: 30 | Refills: 0
Start: 2025-07-18 | End: 2025-08-16

## 2025-07-18 RX ORDER — APIXABAN 5 MG/1
1 TABLET, FILM COATED ORAL
Qty: 60 | Refills: 0
Start: 2025-07-18 | End: 2025-08-16

## 2025-07-18 RX ORDER — SACUBITRIL AND VALSARTAN 6; 6 MG/1; MG/1
1 PELLET ORAL
Refills: 0 | Status: DISCONTINUED | OUTPATIENT
Start: 2025-07-18 | End: 2025-07-18

## 2025-07-18 RX ADMIN — APIXABAN 5 MILLIGRAM(S): 5 TABLET, FILM COATED ORAL at 05:32

## 2025-07-18 RX ADMIN — Medication 81 MILLIGRAM(S): at 14:06

## 2025-07-18 RX ADMIN — Medication 1 APPLICATION(S): at 14:07

## 2025-07-18 RX ADMIN — FUROSEMIDE 40 MILLIGRAM(S): 10 INJECTION INTRAMUSCULAR; INTRAVENOUS at 05:32

## 2025-07-18 NOTE — CHART NOTE - NSCHARTNOTEFT_GEN_A_CORE
Per discussion with Cardiology, Patient may return to work in 48hrs after procedure.   Patient may return to work after 7/20/25
pt s/p ablation via femoral access. Bilateral femoral site clean, dry, intact. Pulses present, capillary refill appropriate. Without hematoma. Will continue to  follow closely.

## 2025-07-18 NOTE — DISCHARGE NOTE PROVIDER - NSDCMRMEDTOKEN_GEN_ALL_CORE_FT
aspirin 81 mg oral delayed release tablet: 1 tab(s) orally once a day  carvedilol 25 mg oral tablet: 1 tab(s) orally every 12 hours  Eliquis 5 mg oral tablet: 1 tab(s) orally 2 times a day  furosemide 40 mg oral tablet: 1 tab(s) orally once a day  sacubitril-valsartan 97 mg-103 mg oral tablet: 1 tab(s) orally 2 times a day   aspirin 81 mg oral delayed release tablet: 1 tab(s) orally once a day  carvedilol 6.25 mg oral tablet: 1 tab(s) orally every 12 hours  Eliquis 5 mg oral tablet: 1 tab(s) orally 2 times a day  furosemide 40 mg oral tablet: 1 tab(s) orally once a day  sacubitril-valsartan 97 mg-103 mg oral tablet: 1 tab(s) orally 2 times a day

## 2025-07-18 NOTE — DISCHARGE NOTE NURSING/CASE MANAGEMENT/SOCIAL WORK - NSDCPEFALRISK_GEN_ALL_CORE
For information on Fall & Injury Prevention, visit: [email or web address] OR  [email or web address] OR  https://www.cdc.gov/steadi/patient.html

## 2025-07-18 NOTE — PROVIDER CONTACT NOTE (OTHER) - REASON
Afib/A flutter
blood pressure medication held
HR 52
Patient bradycardic to the 38 nonsustained
Patient bradycardic to the 40's

## 2025-07-18 NOTE — PROVIDER CONTACT NOTE (OTHER) - ACTION/TREATMENT ORDERED:
ACP, JOSE EDUARDO Garcia made aware. Will continue to monitor
JOSE EDUARDO Munoz made aware. Will hold lopressor.
Kaleb Laughlin notified
Provider Kaleb Cedeno notified.
Provider Kaleb Cedeno notified.

## 2025-07-18 NOTE — PROVIDER CONTACT NOTE (OTHER) - NAME OF MD/NP/PA/DO NOTIFIED:
[facility] Kaleb Cedeno [id number]
Kaleb Laughlin
JOSE EDUARDO Garcia
JOSE EDUARDO Lewis
[facility] Kaleb Cedeno [id number]

## 2025-07-18 NOTE — DISCHARGE NOTE PROVIDER - HOSPITAL COURSE
HPI:  Patient is a 45F with a PMH of CVA in 2023, pAFib on eliquis, CAD, HTN, HLD who presents to the ED for tachycardia.  Patient states that he was sitting on his bed yesterday evening when he got an alert from his watch.  Watch reported that his HR was elevated >120 for ten minutes.  Patient states that he was asymptomatic, denies history of CP, palpitations, dyspnea, dizziness, lightheadedness.  Notes his HR improved slightly before going to sleep.  When patient woke up today, he noted that his HR was still around 120.  Presented to the ED for evaluation.  While in ED, patient noted to have SVT as high as 149 - resolved with vagal manuevers at the bedside.  Patient states that he has remained asymptomatic since onset of tachycardia.  Nonsmoker, social drinker, denies recreational drugs.  Will admit to tele (13 Jul 2025 17:41)    Hospital Course:  Patient underline ablation by EP. doing well post op. medication was adjusted by cardiology. medically stable for d/c home with cardiology and EP follow up.     Important Medication Changes and Reason:      Active or Pending Issues Requiring Follow-up:    Advanced Directives:   [ ] Full code  [ ] DNR  [ ] Hospice    Discharge Diagnoses:  SVT

## 2025-07-18 NOTE — PROGRESS NOTE ADULT - PROVIDER SPECIALTY LIST ADULT
Electrophysiology
Hospitalist
Cardiology
Electrophysiology
Electrophysiology
Hospitalist
Hospitalist
Cardiology
Electrophysiology
Cardiology
Cardiology

## 2025-07-18 NOTE — PROGRESS NOTE ADULT - SUBJECTIVE AND OBJECTIVE BOX
EP ATTENDING    HISTORY OF PRESENT ILLNESS: HPI:  Patient is a 45F with a PMH of CVA in 2023, pAFib on eliquis, CAD, HTN, HLD who presents to the ED for tachycardia.  Patient states that he was sitting on his bed yesterday evening when he got an alert from his watch.  Watch reported that his HR was elevated >120 for ten minutes.  Patient states that he was asymptomatic, denies history of CP, palpitations, dyspnea, dizziness, lightheadedness.  Notes his HR improved slightly before going to sleep.  When patient woke up today, he noted that his HR was still around 120.  Presented to the ED for evaluation.  While in ED, patient noted to have SVT as high as 149 - resolved with vagal manuevers at the bedside.  Patient states that he has remained asymptomatic since onset of tachycardia.  Nonsmoker, social drinker, denies recreational drugs.  Will admit to tele (13 Jul 2025 17:41)    Mr Vuong is a very pleasant 45yoM, self-referred to ED from home for high heartrate identified on AppleWatch and pulse-ox.  Sees our colleague Dr Lyons for general cardiology.  ~6 years ago, patient had a diagnosis of congestive heart failure, LVEF was in the 40s.  LVEF improved on Coreg + Entresto.  He's lost ~100 lbs with diet and exercise (no drugs, no surgeries).  He may have had sleep apnea earlier on, but more recent sleep studies definitively diagnosed NO sleep disordered breathing.  First had AFib diagnosed 2.5yrs ago.  At that point, [id number] score was 2 for hypertension and CHF, but given his young age and good control of his health, no anticoagulation was prescribed.  He then had a stroke (2024?), and HJZIV2TOYX score was 4. He was then prescribed Apixaban 5mg BID, and has been on this medication ever since.  Hes had a few documented episodes of paroxysmal AFib, most converting spontaneously within a few hours. This episode is lasting 2-3 days at this time.  He is not experiencing chest pain, shortness of breath on exertion, palpitations, lightheadedness, or any other identifiable symptoms, but is kept aware due to his AppleWatch alerting him for persistently high heartrates.  A 10 pt ROS is otherwise negative.  7/15- converted to sinus spontaneously overnight.  in AFib, echo with reduced LV systolic function, and notably severe LV hypertrophy.    Awaiting pre-op KIT today, then ablation tomorrow.    7/16- patient's ablation case had to be postponed to a later date.  feeling well, and will return to his room, eat, and await ablation on FRIDAY now.  7/17- patient consented for tomorrow's AFib ablation. no overnight issues.  Date of service 7/18- resting in bed. awaiting ablation this morning.  rhythm today is sinus bradycardia 50s.    PAST MEDICAL & SURGICAL HISTORY:  Congestive heart failure  HTN (hypertension)  HLD (hyperlipidemia)  Paroxysmal atrial fibrillation  History of CVA in adulthood  No significant past surgical history    apixaban 5 milliGRAM(s) Oral two times a day  aspirin enteric coated 81 milliGRAM(s) Oral daily  chlorhexidine 2% Cloths 1 Application(s) Topical daily  furosemide    Tablet 40 milliGRAM(s) Oral daily  metoprolol succinate ER 50 milliGRAM(s) Oral daily                            13.0   7.08  )-----------( 110      ( 18 Jul 2025 04:37 )             40.8       07-18    136  |  105  |  16  ----------------------------<  91  3.8   |  23  |  0.97    Ca    8.7      18 Jul 2025 04:37  Phos  2.8     07-18  Mg     1.80     07-18    T(C): 37 (07-18-25 @ 06:59), Max: 37 (07-18-25 @ 06:59)  HR: 47 (07-18-25 @ 06:59) (46 - 62)  BP: 142/102 (07-18-25 @ 06:59) (136/74 - 153/90)  RR: 16 (07-18-25 @ 06:59) (16 - 18)  SpO2: 99% (07-18-25 @ 06:59) (97% - 100%)  Wt(kg): --    I&O's Summary      General: Well nourished, no acute distress, alert and oriented x 3  Head: normocephalic, no trauma  Neck: no JVD, no bruit, supple, not enlarged  CV: rapid irregular S1S2, no murmurs.    Lungs: clear BL, no rales or wheezes  Abdomen: bowel sounds +, soft, nontender, nondistended  Extremities: no clubbing, cyanosis or edema  Neuro: Moves all 4 extremities, sensation intact x 4 extremities  Skin: warm and moist, normal turgor  Psych: Mood and affect are appropriate for circumstances  MSK: normal range of motion and strength x4 extremities.      TELEMETRY: ATach with variable AV conduction. Some episodes of AFib.  --> converted to sinus.	    ECG: AT (+V1, +II,III).  SVT (likely 2:1 AT) at 143bpm. 	  Echo:    Office, 5/2025  LVEF 50% LVIDd <5cm (no longer dilated), LA 5.4cm in anterior posterior dimension.  Mild LVH.    < from: TTE W or WO Ultrasound Enhancing Agent (07.14.25 @ 12:03) >  CONCLUSIONS:      1. Left ventricular cavity is normal in size. Left ventricular systolic function is moderately decreased with an ejection fraction of 39 % by Prado's method of disks. Global left ventricular hypokinesis.   2. Consider infiltrative cardiomyopathy. Severe left ventricular hypertrophy.   3. Normal right ventricular cavity size, with increased wall thickness, and reduced right ventricular systolic function.   4. Left atrium is mildly dilated.   5. No significant valvular disease.   6. No pericardial effusion seen.   7. Right pleural effusion noted.   8. The inferior vena cava is normal in size measuring 0.98 cm in diameter, (normal <2.1cm) with normal inspiratory collapse (normal >50%) consistent with normal right atrial pressure (~3, range 0-5mmHg).    < end of copied text >      < from: KIT W or WO Ultrasound Enhancing Agent (07.15.25 @ 12:27) >     CONCLUSIONS:      1. Left ventricular systolic function is mildly decreased. Global left ventricular hypokinesis.   2. Normal right ventricular cavity size and normal right ventricular systolic function.   3. Structurally normal mitral valve with normal leaflet excursion. There is trace mitral regurgitation.   4. The aortic valve appears trileaflet with normal systolic excursion. There is no evidence of aortic regurgitation.   5. No atheroma in the visualized portions of the proximal ascending aorta. No atheroma in the visualized portions of the transverse aortic arch. Mild non-mobile atheroma in the visualized portions of the descending aorta.   6. The left atrium is normal in size. There is no evidence of left atrial or left atrial appendage thrombus.   7. Agitated saline injection was negative for intracardiac shunt.    < end of copied text >      ASSESSMENT/PLAN: Mr Vuong is a pleasant 45y Male here with persistently elevated heartrate at home.  Paroxysmal AFib (spontaneously converted to sinus after ~72hrs, longest episode yet).  Has heart failure with recovered LVEF% on good GDMT with beta blockers and entresto.  Nonischemic cardiomyopathy, NYHA II, likely due to prior obesity and hypertension.  Has lost 100lbs+ with diet and exercise.  LVEF during AFib down from 50 --> 39%.    KIT reassuring. No thrombus.  Continue ToprolXL 50mg daily for CHF.  HR 50s-60s.  Entresto on hold perioperatively.  Plan to resume on discharge.  Continue apixaban 5mg BID for secondary prevention of stroke.  Ablation this morning.  Outpatient elective Cardiac MRI re: severe LV hypertrophy, workup for infiltrative cardiomyopathy.  Potential for discharge home this afternoon if he feels well.    Og Mahoney M.D.  Cardiac Electrophysiology  office [phone]  pager [phone]

## 2025-07-18 NOTE — DISCHARGE NOTE NURSING/CASE MANAGEMENT/SOCIAL WORK - PATIENT PORTAL LINK FT
You can access the FollowMyHealth Patient Portal offered by [facility] by registering at the following website: [email or web address]. By joining [facility]’s FollowMyHealth portal, you will also be able to view your health information using other applications (apps) compatible with our system.

## 2025-07-18 NOTE — DISCHARGE NOTE NURSING/CASE MANAGEMENT/SOCIAL WORK - FINANCIAL ASSISTANCE
[facility] provides services at a reduced cost to those who are determined to be eligible through [facility]’s financial assistance program. Information regarding [facility]’s financial assistance program can be found by going to [email or web address] or by calling [phone].

## 2025-07-18 NOTE — PROVIDER CONTACT NOTE (OTHER) - BACKGROUND
Patient is a 45M with a PMH of CVA in 2023, pAFib on eliquis, CAD, HTN, HLD who presents to the ED for tachycardia.  Found to have SVT.  Admitted to tele.  EP plan for ablation v [facility]
Patient is a 45M with a PMH of CVA in 2023, pAFib on eliquis, CAD, HTN, HLD who presents to the ED for tachycardia.  Found to have SVT.  Admitted to tele.  EP plan for ablation v [facility]
(Admit Diagnosis) Supraventricular tachycardia  (PMH) History of CVA in adulthood  (PMH) Paroxysmal atrial fibrillation
(Admit Diagnosis) Supraventricular tachycardia  (PMH) History of CVA in adulthood  (PMH) Paroxysmal atrial fibrillation  PMH CHF
(Admit Diagnosis) Supraventricular tachycardia  (PMH) History of CVA in adulthood  (PMH) Paroxysmal atrial fibrillation

## 2025-07-18 NOTE — PHARMACOTHERAPY INTERVENTION NOTE - COMMENTS
Discharge medications reviewed with the patient. Current medication schedule was discussed in detail including: medication name, indication, dose, administration times, treatment duration, side effects, and special instructions. Also discussed CHF management. Patient counseled on heart failure medication indications, administration, and side effects. Also discussed fluid and salt restrictions, and maintaining a log of daily weights. Discussed warning signs of fluid overload (SOB, orthopnea, LEACH, edema, etc.) and when to seek medical attention. Patient verbalized understanding. Questions and concerns were answered and addressed. Patient provided with CHF booklet.     Norma Garcia, BrittanyD, BCPS  Clinical Pharmacy Specialist  [id number]

## 2025-07-18 NOTE — DISCHARGE NOTE PROVIDER - CARE PROVIDER_API CALL
Roberto Cintron ()  Cardiovascular Disease  [address], [address]  [place] [address]  Phone: [phone]  Fax: [phone]  Scheduled Appointment: 08/04/2025 11:40 AM    Rogelio Ferguson office  Phone: [phone]  Fax: (   )    -  Scheduled Appointment: 07/31/2025 09:45 AM

## 2025-07-18 NOTE — DISCHARGE NOTE PROVIDER - NSDCCPCAREPLAN_GEN_ALL_CORE_FT
PRINCIPAL DISCHARGE DIAGNOSIS  Diagnosis: SVT (supraventricular tachycardia)  Assessment and Plan of Treatment: underwent cardiac ablation on 7/18. follow up with Electrophysiologist.      SECONDARY DISCHARGE DIAGNOSES  Diagnosis: Atrial flutter  Assessment and Plan of Treatment: s/p ablation    Diagnosis: Congestive heart failure  Assessment and Plan of Treatment: Continue regimen from hospital. Follow heart healthy diet. Monitor weight. If you develop severe lower extremity swelling and shortness of breath please seek medial attention. Follow up with your PCP and cardiologist for further evaluation and managment. Please call to make an appointment.

## 2025-07-18 NOTE — DISCHARGE NOTE NURSING/CASE MANAGEMENT/SOCIAL WORK - NSTOBACCONEVERSMOKERY/N_GEN_A
Dr Stoll- Pt in agreement to try gabapentin 100 mg 1 capsule every morning and evening, asked if should continue other medications as prescribed.  New Rx prepped for gabapentin 100 mg, take 1 capsule every morning and evening for 30 day supply/Qty 60, sent to provider for review and to sign.       No

## 2025-07-18 NOTE — DISCHARGE NOTE PROVIDER - ATTENDING DISCHARGE PHYSICAL EXAMINATION:
patient seen and examined at bedside. reports feeling well, no acute complaints. labs and vitals reviewed. no acute changes.    Physical Exam:  GENERAL: no apparent distress; on RA; calm  CHEST/LUNG: Clear to auscultation bilaterally; No wheezing; No crackles  HEART: no obvious audible murmurs; ext warm to touch; No edema  ABDOMEN: Soft, Nontender, Nondistended; Bowel sounds present  MSK: no joint or back on palpation; no joint erythema or swelling.   NEUROLOGY: Awake and alert; CN 2-12 grossly intact, no obvious FND

## 2025-07-18 NOTE — DISCHARGE NOTE PROVIDER - PROVIDER TOKENS
PROVIDER:[TOKEN:[[id number]],SCHEDULEDAPPT:[08/04/2025],SCHEDULEDAPPTTIME:[11:40 AM]],FREE:[LAST:[Dr Phillips],PHONE:[phone]],FAX:[(   )    -],ADDRESS:[[facility]],SCHEDULEDAPPT:[07/31/2025],SCHEDULEDAPPTTIME:[09:45 AM]]

## 2025-07-18 NOTE — PROGRESS NOTE ADULT - NS ATTEND AMEND GEN_ALL_CORE FT
Patient seen and examined. Agree with plan as detailed in PA/NP Note.     For Ablation today  Plan to restart entresto tomorrow    Helio Atkinson MD  Pager: [phone]  Office: [phone]
Patient seen and examined. Agree with plan as detailed in PA/NP Note.     s/p ablation   f/u Dr Phillips (Cardiologist) 7/31 at 945 AM in [place] office, [phone]    Helio Atkinson MD  Pager: [phone]  Office: [phone]

## 2025-07-18 NOTE — PROVIDER CONTACT NOTE (OTHER) - SITUATION
Afib/A flutter on tele/ 
Patient bradycardic to the 38 nonsustained
Patient is a 45M with a PMH of CVA in 2023, pAFib on eliquis, CAD, HTN, HLD who presents to the ED for tachycardia.  Found to have SVT.  Admitted to tele.  EP plan for ablation v [facility]
HR 52. Patient due for Lopressor. Parameters "hold for HR <60"
Patient bradycardic to the 40's

## 2025-07-18 NOTE — PROGRESS NOTE ADULT - WEIGHT IN KG
130.2
Pt had abnormal stress test, indicative of unstable angina pectoris. RT arrives to room at same time as this PTA reporting need for EKG study. Pt is also anticipated to have cardiac cath AM of 11/18/2020. Holding therapy at this time as pt seems medically unstable. Would be more appropriate for session following cardiac catheterization.   
130.2

## 2025-07-18 NOTE — PROVIDER CONTACT NOTE (OTHER) - ASSESSMENT
PT A/O x4.  Tele monitoring SB. On  RA. Patient asymptomatic. Denies dizziness, SOB, CP. All VS documented. /66
Patient AxOx4. Patient asymptomatic. T 97.7 HR 46 /74 O2 100 on room air. No acute signs of distress noted. Safety maintained.
patient due for blood pressure medication at 2200, blood pressure 136/80, HR 54,   medication held for HR <60
Patient AxOx4. Patient asymptomatic. T 97.4 /93 O2 98 on room air. HR now 51. No acute signs of distress noted. Safety maintained.
Patient asymptomatic, denies SOB, CP, LEACH, dizziness or h/a. VS documented. Tachycardia self resolves. PT NPO resting comfortably in bed.
